# Patient Record
Sex: MALE | Race: WHITE | NOT HISPANIC OR LATINO | Employment: FULL TIME | URBAN - METROPOLITAN AREA
[De-identification: names, ages, dates, MRNs, and addresses within clinical notes are randomized per-mention and may not be internally consistent; named-entity substitution may affect disease eponyms.]

---

## 2017-02-28 ENCOUNTER — GENERIC CONVERSION - ENCOUNTER (OUTPATIENT)
Dept: OTHER | Facility: OTHER | Age: 32
End: 2017-02-28

## 2017-04-13 ENCOUNTER — ALLSCRIPTS OFFICE VISIT (OUTPATIENT)
Dept: OTHER | Facility: OTHER | Age: 32
End: 2017-04-13

## 2017-04-13 DIAGNOSIS — E66.01 MORBID (SEVERE) OBESITY DUE TO EXCESS CALORIES (HCC): ICD-10-CM

## 2017-04-17 ENCOUNTER — ALLSCRIPTS OFFICE VISIT (OUTPATIENT)
Dept: OTHER | Facility: OTHER | Age: 32
End: 2017-04-17

## 2017-05-02 ENCOUNTER — APPOINTMENT (OUTPATIENT)
Dept: LAB | Facility: OTHER | Age: 32
End: 2017-05-02
Payer: COMMERCIAL

## 2017-05-02 ENCOUNTER — ANESTHESIA EVENT (OUTPATIENT)
Dept: GASTROENTEROLOGY | Facility: HOSPITAL | Age: 32
End: 2017-05-02
Payer: COMMERCIAL

## 2017-05-02 DIAGNOSIS — E66.01 MORBID (SEVERE) OBESITY DUE TO EXCESS CALORIES (HCC): ICD-10-CM

## 2017-05-02 LAB
ALBUMIN SERPL BCP-MCNC: 3.4 G/DL (ref 3.5–5)
ALP SERPL-CCNC: 90 U/L (ref 46–116)
ALT SERPL W P-5'-P-CCNC: 36 U/L (ref 12–78)
ANION GAP SERPL CALCULATED.3IONS-SCNC: 8 MMOL/L (ref 4–13)
AST SERPL W P-5'-P-CCNC: 18 U/L (ref 5–45)
BILIRUB SERPL-MCNC: 0.54 MG/DL (ref 0.2–1)
BUN SERPL-MCNC: 8 MG/DL (ref 5–25)
CALCIUM SERPL-MCNC: 8.6 MG/DL (ref 8.3–10.1)
CHLORIDE SERPL-SCNC: 101 MMOL/L (ref 100–108)
CHOLEST SERPL-MCNC: 157 MG/DL (ref 50–200)
CO2 SERPL-SCNC: 29 MMOL/L (ref 21–32)
CREAT SERPL-MCNC: 0.82 MG/DL (ref 0.6–1.3)
ERYTHROCYTE [DISTWIDTH] IN BLOOD BY AUTOMATED COUNT: 13.3 % (ref 11.6–15.1)
GFR SERPL CREATININE-BSD FRML MDRD: >60 ML/MIN/1.73SQ M
GLUCOSE P FAST SERPL-MCNC: 76 MG/DL (ref 65–99)
HCT VFR BLD AUTO: 48.7 % (ref 36.5–49.3)
HDLC SERPL-MCNC: 41 MG/DL (ref 40–60)
HGB BLD-MCNC: 16.1 G/DL (ref 12–17)
LDLC SERPL CALC-MCNC: 91 MG/DL (ref 0–100)
MCH RBC QN AUTO: 28.9 PG (ref 26.8–34.3)
MCHC RBC AUTO-ENTMCNC: 33.1 G/DL (ref 31.4–37.4)
MCV RBC AUTO: 87 FL (ref 82–98)
PLATELET # BLD AUTO: 229 THOUSANDS/UL (ref 149–390)
PMV BLD AUTO: 11.1 FL (ref 8.9–12.7)
POTASSIUM SERPL-SCNC: 3.7 MMOL/L (ref 3.5–5.3)
PROT SERPL-MCNC: 7.6 G/DL (ref 6.4–8.2)
RBC # BLD AUTO: 5.58 MILLION/UL (ref 3.88–5.62)
SODIUM SERPL-SCNC: 138 MMOL/L (ref 136–145)
TRIGL SERPL-MCNC: 123 MG/DL
TSH SERPL DL<=0.05 MIU/L-ACNC: 3.04 UIU/ML (ref 0.36–3.74)
WBC # BLD AUTO: 7.68 THOUSAND/UL (ref 4.31–10.16)

## 2017-05-02 PROCEDURE — 84443 ASSAY THYROID STIM HORMONE: CPT

## 2017-05-02 PROCEDURE — 85027 COMPLETE CBC AUTOMATED: CPT

## 2017-05-02 PROCEDURE — 36415 COLL VENOUS BLD VENIPUNCTURE: CPT

## 2017-05-02 PROCEDURE — 80053 COMPREHEN METABOLIC PANEL: CPT

## 2017-05-02 PROCEDURE — 80061 LIPID PANEL: CPT

## 2017-05-03 ENCOUNTER — HOSPITAL ENCOUNTER (OUTPATIENT)
Facility: HOSPITAL | Age: 32
Setting detail: OUTPATIENT SURGERY
Discharge: HOME | End: 2017-05-03
Attending: SURGERY | Admitting: SURGERY
Payer: COMMERCIAL

## 2017-05-03 ENCOUNTER — ANESTHESIA (OUTPATIENT)
Dept: GASTROENTEROLOGY | Facility: HOSPITAL | Age: 32
End: 2017-05-03
Payer: COMMERCIAL

## 2017-05-03 VITALS
BODY MASS INDEX: 45.1 KG/M2 | TEMPERATURE: 96.9 F | RESPIRATION RATE: 16 BRPM | DIASTOLIC BLOOD PRESSURE: 83 MMHG | HEIGHT: 70 IN | HEART RATE: 73 BPM | OXYGEN SATURATION: 99 % | SYSTOLIC BLOOD PRESSURE: 125 MMHG | WEIGHT: 315 LBS

## 2017-05-03 DIAGNOSIS — E66.9 OBESITY: ICD-10-CM

## 2017-05-03 PROCEDURE — 88305 TISSUE EXAM BY PATHOLOGIST: CPT | Performed by: SURGERY

## 2017-05-03 PROCEDURE — 88342 IMHCHEM/IMCYTCHM 1ST ANTB: CPT | Performed by: SURGERY

## 2017-05-03 RX ORDER — SODIUM CHLORIDE 9 MG/ML
125 INJECTION, SOLUTION INTRAVENOUS CONTINUOUS
Status: DISCONTINUED | OUTPATIENT
Start: 2017-05-03 | End: 2017-05-03 | Stop reason: HOSPADM

## 2017-05-03 RX ORDER — PROPOFOL 10 MG/ML
INJECTION, EMULSION INTRAVENOUS AS NEEDED
Status: DISCONTINUED | OUTPATIENT
Start: 2017-05-03 | End: 2017-05-03 | Stop reason: SURG

## 2017-05-03 RX ADMIN — PROPOFOL 50 MG: 10 INJECTION, EMULSION INTRAVENOUS at 09:24

## 2017-05-03 RX ADMIN — PROPOFOL 50 MG: 10 INJECTION, EMULSION INTRAVENOUS at 09:25

## 2017-05-03 RX ADMIN — PROPOFOL 200 MG: 10 INJECTION, EMULSION INTRAVENOUS at 09:21

## 2017-05-03 RX ADMIN — SODIUM CHLORIDE 125 ML/HR: 0.9 INJECTION, SOLUTION INTRAVENOUS at 09:01

## 2017-05-03 RX ADMIN — LIDOCAINE HYDROCHLORIDE 50 MG: 20 INJECTION, SOLUTION INTRAVENOUS at 09:21

## 2017-05-04 ENCOUNTER — GENERIC CONVERSION - ENCOUNTER (OUTPATIENT)
Dept: OTHER | Facility: OTHER | Age: 32
End: 2017-05-04

## 2017-06-01 ENCOUNTER — ANESTHESIA EVENT (OUTPATIENT)
Dept: PERIOP | Facility: HOSPITAL | Age: 32
DRG: 621 | End: 2017-06-01
Payer: COMMERCIAL

## 2017-06-01 ENCOUNTER — ALLSCRIPTS OFFICE VISIT (OUTPATIENT)
Dept: OTHER | Facility: OTHER | Age: 32
End: 2017-06-01

## 2017-06-20 ENCOUNTER — ANESTHESIA (OUTPATIENT)
Dept: PERIOP | Facility: HOSPITAL | Age: 32
DRG: 621 | End: 2017-06-20
Payer: COMMERCIAL

## 2017-06-20 ENCOUNTER — HOSPITAL ENCOUNTER (INPATIENT)
Facility: HOSPITAL | Age: 32
LOS: 1 days | Discharge: HOME | DRG: 621 | End: 2017-06-21
Attending: SURGERY | Admitting: SURGERY
Payer: COMMERCIAL

## 2017-06-20 DIAGNOSIS — E66.01 MORBID (SEVERE) OBESITY DUE TO EXCESS CALORIES (HCC): ICD-10-CM

## 2017-06-20 PROCEDURE — C9113 INJ PANTOPRAZOLE SODIUM, VIA: HCPCS | Performed by: SURGERY

## 2017-06-20 PROCEDURE — 0DB64Z3 EXCISION OF STOMACH, PERCUTANEOUS ENDOSCOPIC APPROACH, VERTICAL: ICD-10-PCS | Performed by: SURGERY

## 2017-06-20 PROCEDURE — 88307 TISSUE EXAM BY PATHOLOGIST: CPT | Performed by: SURGERY

## 2017-06-20 RX ORDER — DEXAMETHASONE SODIUM PHOSPHATE 4 MG/ML
INJECTION, SOLUTION INTRA-ARTICULAR; INTRALESIONAL; INTRAMUSCULAR; INTRAVENOUS; SOFT TISSUE AS NEEDED
Status: DISCONTINUED | OUTPATIENT
Start: 2017-06-20 | End: 2017-06-20 | Stop reason: SURG

## 2017-06-20 RX ORDER — ACETAMINOPHEN 160 MG/5ML
320 SUSPENSION, ORAL (FINAL DOSE FORM) ORAL EVERY 4 HOURS PRN
Status: DISCONTINUED | OUTPATIENT
Start: 2017-06-20 | End: 2017-06-21 | Stop reason: HOSPADM

## 2017-06-20 RX ORDER — SODIUM CHLORIDE 9 MG/ML
125 INJECTION, SOLUTION INTRAVENOUS CONTINUOUS
Status: DISCONTINUED | OUTPATIENT
Start: 2017-06-20 | End: 2017-06-20

## 2017-06-20 RX ORDER — DIPHENOXYLATE HYDROCHLORIDE AND ATROPINE SULFATE 2.5; .025 MG/1; MG/1
1 TABLET ORAL DAILY
COMMUNITY

## 2017-06-20 RX ORDER — MORPHINE SULFATE 2 MG/ML
2 INJECTION, SOLUTION INTRAMUSCULAR; INTRAVENOUS EVERY 2 HOUR PRN
Status: DISCONTINUED | OUTPATIENT
Start: 2017-06-20 | End: 2017-06-21 | Stop reason: HOSPADM

## 2017-06-20 RX ORDER — ONDANSETRON 2 MG/ML
4 INJECTION INTRAMUSCULAR; INTRAVENOUS EVERY 4 HOURS PRN
Status: DISCONTINUED | OUTPATIENT
Start: 2017-06-20 | End: 2017-06-21 | Stop reason: HOSPADM

## 2017-06-20 RX ORDER — ACETAMINOPHEN 160 MG/5ML
325 SUSPENSION, ORAL (FINAL DOSE FORM) ORAL EVERY 4 HOURS PRN
Status: DISCONTINUED | OUTPATIENT
Start: 2017-06-20 | End: 2017-06-21 | Stop reason: HOSPADM

## 2017-06-20 RX ORDER — OXYCODONE HCL 5 MG/5 ML
10 SOLUTION, ORAL ORAL EVERY 4 HOURS PRN
Status: DISCONTINUED | OUTPATIENT
Start: 2017-06-20 | End: 2017-06-21 | Stop reason: HOSPADM

## 2017-06-20 RX ORDER — OXYCODONE HCL 5 MG/5 ML
5 SOLUTION, ORAL ORAL EVERY 4 HOURS PRN
Status: DISCONTINUED | OUTPATIENT
Start: 2017-06-20 | End: 2017-06-21 | Stop reason: HOSPADM

## 2017-06-20 RX ORDER — MORPHINE SULFATE 4 MG/ML
4 INJECTION, SOLUTION INTRAMUSCULAR; INTRAVENOUS EVERY 2 HOUR PRN
Status: DISCONTINUED | OUTPATIENT
Start: 2017-06-20 | End: 2017-06-21 | Stop reason: HOSPADM

## 2017-06-20 RX ORDER — GLYCOPYRROLATE 0.2 MG/ML
INJECTION INTRAMUSCULAR; INTRAVENOUS AS NEEDED
Status: DISCONTINUED | OUTPATIENT
Start: 2017-06-20 | End: 2017-06-20 | Stop reason: SURG

## 2017-06-20 RX ORDER — SODIUM CHLORIDE, SODIUM LACTATE, POTASSIUM CHLORIDE, CALCIUM CHLORIDE 600; 310; 30; 20 MG/100ML; MG/100ML; MG/100ML; MG/100ML
125 INJECTION, SOLUTION INTRAVENOUS CONTINUOUS
Status: DISCONTINUED | OUTPATIENT
Start: 2017-06-20 | End: 2017-06-21 | Stop reason: HOSPADM

## 2017-06-20 RX ORDER — ONDANSETRON 2 MG/ML
INJECTION INTRAMUSCULAR; INTRAVENOUS AS NEEDED
Status: DISCONTINUED | OUTPATIENT
Start: 2017-06-20 | End: 2017-06-20 | Stop reason: SURG

## 2017-06-20 RX ORDER — HYDROMORPHONE HYDROCHLORIDE 2 MG/ML
INJECTION, SOLUTION INTRAMUSCULAR; INTRAVENOUS; SUBCUTANEOUS AS NEEDED
Status: DISCONTINUED | OUTPATIENT
Start: 2017-06-20 | End: 2017-06-20 | Stop reason: SURG

## 2017-06-20 RX ORDER — BUPIVACAINE HYDROCHLORIDE AND EPINEPHRINE 5; 5 MG/ML; UG/ML
INJECTION, SOLUTION PERINEURAL AS NEEDED
Status: DISCONTINUED | OUTPATIENT
Start: 2017-06-20 | End: 2017-06-20 | Stop reason: HOSPADM

## 2017-06-20 RX ORDER — SCOLOPAMINE TRANSDERMAL SYSTEM 1 MG/1
1 PATCH, EXTENDED RELEASE TRANSDERMAL ONCE AS NEEDED
Status: DISCONTINUED | OUTPATIENT
Start: 2017-06-20 | End: 2017-06-20

## 2017-06-20 RX ORDER — PROMETHAZINE HYDROCHLORIDE 25 MG/ML
25 INJECTION, SOLUTION INTRAMUSCULAR; INTRAVENOUS EVERY 4 HOURS PRN
Status: DISCONTINUED | OUTPATIENT
Start: 2017-06-20 | End: 2017-06-21 | Stop reason: HOSPADM

## 2017-06-20 RX ORDER — ONDANSETRON 2 MG/ML
4 INJECTION INTRAMUSCULAR; INTRAVENOUS ONCE AS NEEDED
Status: DISCONTINUED | OUTPATIENT
Start: 2017-06-20 | End: 2017-06-20 | Stop reason: HOSPADM

## 2017-06-20 RX ORDER — PANTOPRAZOLE SODIUM 40 MG/1
40 INJECTION, POWDER, FOR SOLUTION INTRAVENOUS
Status: DISCONTINUED | OUTPATIENT
Start: 2017-06-20 | End: 2017-06-21 | Stop reason: HOSPADM

## 2017-06-20 RX ORDER — ROCURONIUM BROMIDE 10 MG/ML
INJECTION, SOLUTION INTRAVENOUS AS NEEDED
Status: DISCONTINUED | OUTPATIENT
Start: 2017-06-20 | End: 2017-06-20 | Stop reason: SURG

## 2017-06-20 RX ORDER — MIDAZOLAM HYDROCHLORIDE 1 MG/ML
INJECTION INTRAMUSCULAR; INTRAVENOUS AS NEEDED
Status: DISCONTINUED | OUTPATIENT
Start: 2017-06-20 | End: 2017-06-20 | Stop reason: SURG

## 2017-06-20 RX ORDER — PROPOFOL 10 MG/ML
INJECTION, EMULSION INTRAVENOUS AS NEEDED
Status: DISCONTINUED | OUTPATIENT
Start: 2017-06-20 | End: 2017-06-20 | Stop reason: SURG

## 2017-06-20 RX ORDER — FENTANYL CITRATE 50 UG/ML
INJECTION, SOLUTION INTRAMUSCULAR; INTRAVENOUS AS NEEDED
Status: DISCONTINUED | OUTPATIENT
Start: 2017-06-20 | End: 2017-06-20 | Stop reason: SURG

## 2017-06-20 RX ORDER — MAGNESIUM HYDROXIDE 1200 MG/15ML
LIQUID ORAL AS NEEDED
Status: DISCONTINUED | OUTPATIENT
Start: 2017-06-20 | End: 2017-06-20 | Stop reason: HOSPADM

## 2017-06-20 RX ADMIN — OXYCODONE HYDROCHLORIDE 10 MG: 5 SOLUTION ORAL at 19:47

## 2017-06-20 RX ADMIN — GLYCOPYRROLATE 0.6 MG: 0.2 INJECTION, SOLUTION INTRAMUSCULAR; INTRAVENOUS at 11:34

## 2017-06-20 RX ADMIN — SODIUM CHLORIDE: 0.9 INJECTION, SOLUTION INTRAVENOUS at 11:50

## 2017-06-20 RX ADMIN — HYDROMORPHONE HYDROCHLORIDE 0.5 MG: 1 INJECTION, SOLUTION INTRAMUSCULAR; INTRAVENOUS; SUBCUTANEOUS at 12:06

## 2017-06-20 RX ADMIN — HYDROMORPHONE HYDROCHLORIDE 0.5 MG: 2 INJECTION, SOLUTION INTRAMUSCULAR; INTRAVENOUS; SUBCUTANEOUS at 11:34

## 2017-06-20 RX ADMIN — ENOXAPARIN SODIUM 40 MG: 40 INJECTION SUBCUTANEOUS at 09:48

## 2017-06-20 RX ADMIN — FENTANYL CITRATE 100 MCG: 50 INJECTION, SOLUTION INTRAMUSCULAR; INTRAVENOUS at 10:24

## 2017-06-20 RX ADMIN — MORPHINE SULFATE 4 MG: 4 INJECTION, SOLUTION INTRAMUSCULAR; INTRAVENOUS at 17:11

## 2017-06-20 RX ADMIN — ONDANSETRON 4 MG: 2 INJECTION INTRAMUSCULAR; INTRAVENOUS at 19:55

## 2017-06-20 RX ADMIN — PANTOPRAZOLE SODIUM 40 MG: 40 INJECTION, POWDER, FOR SOLUTION INTRAVENOUS at 17:10

## 2017-06-20 RX ADMIN — SODIUM CHLORIDE 125 ML/HR: 0.9 INJECTION, SOLUTION INTRAVENOUS at 09:56

## 2017-06-20 RX ADMIN — NEOSTIGMINE METHYLSULFATE 3 MG: 1 INJECTION, SOLUTION INTRAMUSCULAR; INTRAVENOUS; SUBCUTANEOUS at 11:34

## 2017-06-20 RX ADMIN — HYDROMORPHONE HYDROCHLORIDE 0.5 MG: 2 INJECTION, SOLUTION INTRAMUSCULAR; INTRAVENOUS; SUBCUTANEOUS at 11:19

## 2017-06-20 RX ADMIN — HYDROMORPHONE HYDROCHLORIDE 0.5 MG: 2 INJECTION, SOLUTION INTRAMUSCULAR; INTRAVENOUS; SUBCUTANEOUS at 11:46

## 2017-06-20 RX ADMIN — HYDROMORPHONE HYDROCHLORIDE 0.5 MG: 1 INJECTION, SOLUTION INTRAMUSCULAR; INTRAVENOUS; SUBCUTANEOUS at 12:35

## 2017-06-20 RX ADMIN — ONDANSETRON HYDROCHLORIDE 4 MG: 2 INJECTION, SOLUTION INTRAVENOUS at 10:32

## 2017-06-20 RX ADMIN — SCOPALAMINE 1 PATCH: 1 PATCH, EXTENDED RELEASE TRANSDERMAL at 09:12

## 2017-06-20 RX ADMIN — ROCURONIUM BROMIDE 50 MG: 10 INJECTION, SOLUTION INTRAVENOUS at 10:24

## 2017-06-20 RX ADMIN — FENTANYL CITRATE 50 MCG: 50 INJECTION, SOLUTION INTRAMUSCULAR; INTRAVENOUS at 11:03

## 2017-06-20 RX ADMIN — MIDAZOLAM HYDROCHLORIDE 4 MG: 1 INJECTION, SOLUTION INTRAMUSCULAR; INTRAVENOUS at 10:17

## 2017-06-20 RX ADMIN — ACETAMINOPHEN 325 MG: 160 SUSPENSION ORAL at 19:47

## 2017-06-20 RX ADMIN — CEFAZOLIN SODIUM 2000 MG: 2 SOLUTION INTRAVENOUS at 10:34

## 2017-06-20 RX ADMIN — SODIUM CHLORIDE, SODIUM LACTATE, POTASSIUM CHLORIDE, AND CALCIUM CHLORIDE 125 ML/HR: .6; .31; .03; .02 INJECTION, SOLUTION INTRAVENOUS at 12:54

## 2017-06-20 RX ADMIN — HYDROMORPHONE HYDROCHLORIDE 0.5 MG: 2 INJECTION, SOLUTION INTRAMUSCULAR; INTRAVENOUS; SUBCUTANEOUS at 11:54

## 2017-06-20 RX ADMIN — PROPOFOL 200 MG: 10 INJECTION, EMULSION INTRAVENOUS at 10:24

## 2017-06-20 RX ADMIN — DEXAMETHASONE SODIUM PHOSPHATE 4 MG: 4 INJECTION, SOLUTION INTRAMUSCULAR; INTRAVENOUS at 10:32

## 2017-06-20 RX ADMIN — FENTANYL CITRATE 50 MCG: 50 INJECTION, SOLUTION INTRAMUSCULAR; INTRAVENOUS at 10:35

## 2017-06-20 RX ADMIN — MORPHINE SULFATE 4 MG: 4 INJECTION, SOLUTION INTRAMUSCULAR; INTRAVENOUS at 14:06

## 2017-06-20 RX ADMIN — SODIUM CHLORIDE: 0.9 INJECTION, SOLUTION INTRAVENOUS at 11:12

## 2017-06-20 RX ADMIN — HYDROMORPHONE HYDROCHLORIDE 0.5 MG: 1 INJECTION, SOLUTION INTRAMUSCULAR; INTRAVENOUS; SUBCUTANEOUS at 12:21

## 2017-06-20 RX ADMIN — LIDOCAINE HYDROCHLORIDE 100 MG: 20 INJECTION, SOLUTION INTRAVENOUS at 10:24

## 2017-06-20 RX ADMIN — ROCURONIUM BROMIDE 10 MG: 10 INJECTION, SOLUTION INTRAVENOUS at 10:47

## 2017-06-20 RX ADMIN — HYDROMORPHONE HYDROCHLORIDE 0.5 MG: 1 INJECTION, SOLUTION INTRAMUSCULAR; INTRAVENOUS; SUBCUTANEOUS at 12:46

## 2017-06-20 RX ADMIN — SODIUM CHLORIDE, SODIUM LACTATE, POTASSIUM CHLORIDE, AND CALCIUM CHLORIDE 125 ML/HR: .6; .31; .03; .02 INJECTION, SOLUTION INTRAVENOUS at 20:20

## 2017-06-21 VITALS
DIASTOLIC BLOOD PRESSURE: 75 MMHG | SYSTOLIC BLOOD PRESSURE: 127 MMHG | WEIGHT: 304.9 LBS | OXYGEN SATURATION: 93 % | BODY MASS INDEX: 43.65 KG/M2 | HEART RATE: 54 BPM | TEMPERATURE: 97.8 F | HEIGHT: 70 IN | RESPIRATION RATE: 16 BRPM

## 2017-06-21 PROBLEM — E66.01 MORBID OBESITY (HCC): Status: ACTIVE | Noted: 2017-06-21

## 2017-06-21 LAB
ANION GAP SERPL CALCULATED.3IONS-SCNC: 8 MMOL/L (ref 4–13)
BUN SERPL-MCNC: 6 MG/DL (ref 5–25)
CALCIUM SERPL-MCNC: 8.7 MG/DL (ref 8.3–10.1)
CHLORIDE SERPL-SCNC: 102 MMOL/L (ref 100–108)
CO2 SERPL-SCNC: 28 MMOL/L (ref 21–32)
CREAT SERPL-MCNC: 0.86 MG/DL (ref 0.6–1.3)
ERYTHROCYTE [DISTWIDTH] IN BLOOD BY AUTOMATED COUNT: 13.6 % (ref 11.6–15.1)
GFR SERPL CREATININE-BSD FRML MDRD: >60 ML/MIN/1.73SQ M
GLUCOSE SERPL-MCNC: 126 MG/DL (ref 65–140)
HCT VFR BLD AUTO: 45.2 % (ref 36.5–49.3)
HGB BLD-MCNC: 15.2 G/DL (ref 12–17)
MCH RBC QN AUTO: 28.5 PG (ref 26.8–34.3)
MCHC RBC AUTO-ENTMCNC: 33.6 G/DL (ref 31.4–37.4)
MCV RBC AUTO: 85 FL (ref 82–98)
PLATELET # BLD AUTO: 265 THOUSANDS/UL (ref 149–390)
PMV BLD AUTO: 10.5 FL (ref 8.9–12.7)
POTASSIUM SERPL-SCNC: 4.5 MMOL/L (ref 3.5–5.3)
RBC # BLD AUTO: 5.33 MILLION/UL (ref 3.88–5.62)
SODIUM SERPL-SCNC: 138 MMOL/L (ref 136–145)
WBC # BLD AUTO: 10.79 THOUSAND/UL (ref 4.31–10.16)

## 2017-06-21 PROCEDURE — 80048 BASIC METABOLIC PNL TOTAL CA: CPT | Performed by: SURGERY

## 2017-06-21 PROCEDURE — C9113 INJ PANTOPRAZOLE SODIUM, VIA: HCPCS | Performed by: SURGERY

## 2017-06-21 PROCEDURE — 85027 COMPLETE CBC AUTOMATED: CPT | Performed by: SURGERY

## 2017-06-21 RX ORDER — OXYCODONE HYDROCHLORIDE AND ACETAMINOPHEN 5; 325 MG/1; MG/1
1 TABLET ORAL EVERY 4 HOURS PRN
Qty: 20 TABLET | Refills: 0
Start: 2017-06-21 | End: 2017-07-01

## 2017-06-21 RX ORDER — OMEPRAZOLE 20 MG/1
20 CAPSULE, DELAYED RELEASE ORAL DAILY
Qty: 30 CAPSULE | Refills: 0 | Status: SHIPPED | OUTPATIENT
Start: 2017-06-21 | End: 2018-07-06 | Stop reason: ALTCHOICE

## 2017-06-21 RX ADMIN — ACETAMINOPHEN 325 MG: 160 SUSPENSION ORAL at 08:11

## 2017-06-21 RX ADMIN — SODIUM CHLORIDE, SODIUM LACTATE, POTASSIUM CHLORIDE, AND CALCIUM CHLORIDE 125 ML/HR: .6; .31; .03; .02 INJECTION, SOLUTION INTRAVENOUS at 04:01

## 2017-06-21 RX ADMIN — OXYCODONE HYDROCHLORIDE 10 MG: 5 SOLUTION ORAL at 02:55

## 2017-06-21 RX ADMIN — OXYCODONE HYDROCHLORIDE 10 MG: 5 SOLUTION ORAL at 08:11

## 2017-06-21 RX ADMIN — PANTOPRAZOLE SODIUM 40 MG: 40 INJECTION, POWDER, FOR SOLUTION INTRAVENOUS at 08:10

## 2017-06-21 RX ADMIN — ACETAMINOPHEN 325 MG: 160 SUSPENSION ORAL at 02:55

## 2017-06-21 RX ADMIN — ENOXAPARIN SODIUM 40 MG: 40 INJECTION SUBCUTANEOUS at 08:10

## 2017-06-23 ENCOUNTER — GENERIC CONVERSION - ENCOUNTER (OUTPATIENT)
Dept: OTHER | Facility: OTHER | Age: 32
End: 2017-06-23

## 2017-06-29 ENCOUNTER — GENERIC CONVERSION - ENCOUNTER (OUTPATIENT)
Dept: OTHER | Facility: OTHER | Age: 32
End: 2017-06-29

## 2017-06-29 ENCOUNTER — ALLSCRIPTS OFFICE VISIT (OUTPATIENT)
Dept: OTHER | Facility: OTHER | Age: 32
End: 2017-06-29

## 2017-07-13 ENCOUNTER — GENERIC CONVERSION - ENCOUNTER (OUTPATIENT)
Dept: OTHER | Facility: OTHER | Age: 32
End: 2017-07-13

## 2017-08-03 ENCOUNTER — GENERIC CONVERSION - ENCOUNTER (OUTPATIENT)
Dept: OTHER | Facility: OTHER | Age: 32
End: 2017-08-03

## 2017-09-29 ENCOUNTER — ALLSCRIPTS OFFICE VISIT (OUTPATIENT)
Dept: OTHER | Facility: OTHER | Age: 32
End: 2017-09-29

## 2017-09-29 DIAGNOSIS — E66.9 OBESITY: ICD-10-CM

## 2017-09-29 DIAGNOSIS — Z00.00 ENCOUNTER FOR GENERAL ADULT MEDICAL EXAMINATION WITHOUT ABNORMAL FINDINGS: ICD-10-CM

## 2017-09-29 DIAGNOSIS — K91.2 POSTSURGICAL MALABSORPTION, NOT ELSEWHERE CLASSIFIED: ICD-10-CM

## 2017-09-29 DIAGNOSIS — Z98.84 BARIATRIC SURGERY STATUS: ICD-10-CM

## 2017-12-22 ENCOUNTER — APPOINTMENT (OUTPATIENT)
Dept: LAB | Facility: OTHER | Age: 32
End: 2017-12-22
Payer: COMMERCIAL

## 2017-12-22 ENCOUNTER — GENERIC CONVERSION - ENCOUNTER (OUTPATIENT)
Dept: OTHER | Facility: OTHER | Age: 32
End: 2017-12-22

## 2017-12-22 ENCOUNTER — TRANSCRIBE ORDERS (OUTPATIENT)
Dept: LAB | Facility: OTHER | Age: 32
End: 2017-12-22

## 2017-12-22 DIAGNOSIS — Z98.84 BARIATRIC SURGERY STATUS: ICD-10-CM

## 2017-12-22 DIAGNOSIS — Z00.00 ENCOUNTER FOR GENERAL ADULT MEDICAL EXAMINATION WITHOUT ABNORMAL FINDINGS: ICD-10-CM

## 2017-12-22 DIAGNOSIS — E66.9 OBESITY: ICD-10-CM

## 2017-12-22 DIAGNOSIS — K91.2 POSTSURGICAL MALABSORPTION, NOT ELSEWHERE CLASSIFIED: ICD-10-CM

## 2017-12-22 LAB
25(OH)D3 SERPL-MCNC: 27.6 NG/ML (ref 30–100)
ALBUMIN SERPL BCP-MCNC: 3.4 G/DL (ref 3.5–5)
ALP SERPL-CCNC: 90 U/L (ref 46–116)
ALT SERPL W P-5'-P-CCNC: 16 U/L (ref 12–78)
ANION GAP SERPL CALCULATED.3IONS-SCNC: 3 MMOL/L (ref 4–13)
AST SERPL W P-5'-P-CCNC: 13 U/L (ref 5–45)
BILIRUB SERPL-MCNC: 0.79 MG/DL (ref 0.2–1)
BUN SERPL-MCNC: 9 MG/DL (ref 5–25)
CALCIUM SERPL-MCNC: 9.3 MG/DL (ref 8.3–10.1)
CHLORIDE SERPL-SCNC: 104 MMOL/L (ref 100–108)
CHOLEST SERPL-MCNC: 155 MG/DL (ref 50–200)
CO2 SERPL-SCNC: 32 MMOL/L (ref 21–32)
CREAT SERPL-MCNC: 0.82 MG/DL (ref 0.6–1.3)
ERYTHROCYTE [DISTWIDTH] IN BLOOD BY AUTOMATED COUNT: 13.2 % (ref 11.6–15.1)
FERRITIN SERPL-MCNC: 229 NG/ML (ref 8–388)
FOLATE SERPL-MCNC: >20 NG/ML (ref 3.1–17.5)
GFR SERPL CREATININE-BSD FRML MDRD: 115 ML/MIN/1.73SQ M
GLUCOSE P FAST SERPL-MCNC: 90 MG/DL (ref 65–99)
HCT VFR BLD AUTO: 48.6 % (ref 36.5–49.3)
HDLC SERPL-MCNC: 46 MG/DL (ref 40–60)
HGB BLD-MCNC: 16.2 G/DL (ref 12–17)
LDLC SERPL CALC-MCNC: 88 MG/DL (ref 0–100)
MCH RBC QN AUTO: 29 PG (ref 26.8–34.3)
MCHC RBC AUTO-ENTMCNC: 33.3 G/DL (ref 31.4–37.4)
MCV RBC AUTO: 87 FL (ref 82–98)
PLATELET # BLD AUTO: 212 THOUSANDS/UL (ref 149–390)
PMV BLD AUTO: 10.4 FL (ref 8.9–12.7)
POTASSIUM SERPL-SCNC: 4.2 MMOL/L (ref 3.5–5.3)
PROT SERPL-MCNC: 7.3 G/DL (ref 6.4–8.2)
PTH-INTACT SERPL-MCNC: 38.4 PG/ML (ref 14–72)
RBC # BLD AUTO: 5.58 MILLION/UL (ref 3.88–5.62)
SODIUM SERPL-SCNC: 139 MMOL/L (ref 136–145)
TRIGL SERPL-MCNC: 105 MG/DL
VIT B12 SERPL-MCNC: 506 PG/ML (ref 100–900)
WBC # BLD AUTO: 5.57 THOUSAND/UL (ref 4.31–10.16)

## 2017-12-22 PROCEDURE — 82607 VITAMIN B-12: CPT

## 2017-12-22 PROCEDURE — 80061 LIPID PANEL: CPT

## 2017-12-22 PROCEDURE — 83970 ASSAY OF PARATHORMONE: CPT

## 2017-12-22 PROCEDURE — 82728 ASSAY OF FERRITIN: CPT

## 2017-12-22 PROCEDURE — 85027 COMPLETE CBC AUTOMATED: CPT

## 2017-12-22 PROCEDURE — 80053 COMPREHEN METABOLIC PANEL: CPT

## 2017-12-22 PROCEDURE — 84590 ASSAY OF VITAMIN A: CPT

## 2017-12-22 PROCEDURE — 82746 ASSAY OF FOLIC ACID SERUM: CPT

## 2017-12-22 PROCEDURE — 84425 ASSAY OF VITAMIN B-1: CPT

## 2017-12-22 PROCEDURE — 36415 COLL VENOUS BLD VENIPUNCTURE: CPT

## 2017-12-22 PROCEDURE — 82306 VITAMIN D 25 HYDROXY: CPT

## 2017-12-26 LAB — VIT A SERPL-MCNC: 34 UG/DL (ref 24–85)

## 2017-12-27 LAB — VIT B1 BLD-SCNC: 147.7 NMOL/L (ref 66.5–200)

## 2018-01-02 ENCOUNTER — GENERIC CONVERSION - ENCOUNTER (OUTPATIENT)
Dept: OTHER | Facility: OTHER | Age: 33
End: 2018-01-02

## 2018-01-04 ENCOUNTER — ALLSCRIPTS OFFICE VISIT (OUTPATIENT)
Dept: OTHER | Facility: OTHER | Age: 33
End: 2018-01-04

## 2018-01-05 NOTE — PROGRESS NOTES
Assessment   1  Status post laparoscopic sleeve gastrectomy (V45 86) (Z98 84)   2  Postgastrectomy malabsorption (579 3) (K91 2,Z90 3)   3  Obesity (BMI 30-39 9) (278 00) (E66 9)   4  Vitamin D insufficiency (268 9) (E55 9)    Plan   Obesity (BMI 30-39 9), Postgastrectomy malabsorption, Status post laparoscopic sleeve    gastrectomy, Vitamin D insufficiency    · (1) CBC/ PLT (NO DIFF); Status:Active; Requested WQJ:[id number]; Perform:[facility]; FMQ:62QKL2800;ULQYQPE;KXH:KSCYNPR (BMI 30-39 9), Postgastrectomy malabsorption, Status post laparoscopic sleeve gastrectomy, Vitamin D insufficiency; Ordered By:[facility];   · (1) COMPREHENSIVE METABOLIC PANEL; Status:Active; Requested VON:[id number]; Perform:[facility]; MGN:36CMM6975;EMNWYMK;NSE:TBJUAWR (BMI 30-39 9), Postgastrectomy malabsorption, Status post laparoscopic sleeve gastrectomy, Vitamin D insufficiency; Ordered By:[facility];   · (1) FERRITIN; Status:Active; Requested HCY:[id number]; Perform:[facility]; RVS:46YCS2989;RYYOSUG;QFZ:NYLGLYJ (BMI 30-39 9), Postgastrectomy malabsorption, Status post laparoscopic sleeve gastrectomy, Vitamin D insufficiency; Ordered By:[facility];   · (1) FOLATE; Status:Active; Requested PJS:[id number]; Perform:[facility]; XGO:71JYH8201;RTCJSVP;ZIN:FYSIWAG (BMI 30-39 9), Postgastrectomy malabsorption, Status post laparoscopic sleeve gastrectomy, Vitamin D insufficiency; [facility];   · (1) LIPID PANEL, FASTING; Status:Active; Requested XSC:[id number]; Perform:[facility]; BRAXTON:92BEB8366;MRPUBCM;ZGO:WHDWEJF (BMI 30-39 9), Postgastrectomy malabsorption, Status post laparoscopic sleeve gastrectomy, Vitamin D insufficiency; Ordered By:China Alfaro;   · (1) PTH N-TERMINAL (INTACT); Status:Active; Requested HCN:[id number];     Perform:[facility]; NBE:48CER8707;RQMWJHV;TWR:NPSNQKT (BMI 30-39 9), Postgastrectomy malabsorption, Status post laparoscopic sleeve gastrectomy, Vitamin D insufficiency; Ordered By:Andra Alfaro;   · (1) VITAMIN A; Status:Active; Requested AZA:[id number]; Perform:[facility]; OYR:23ECA8073;IBYNAKT;EBJ:DPCLJMS (BMI 30-39 9), Postgastrectomy malabsorption, Status post laparoscopic sleeve gastrectomy, Vitamin D insufficiency; Ordered By:China Alfaro;   · (1) VITAMIN B1, WHOLE BLOOD; Status:Active; Requested PXW:[id number]; Perform:[facility]; TXJ:32AOQ4440;MIONDAQ;QPY:QEHSUYH (BMI 30-39 9), Postgastrectomy malabsorption, Status post laparoscopic sleeve gastrectomy, Vitamin D insufficiency; Ordered By:Andra Alfaro;   · (1) VITAMIN B12; Status:Active; Requested LNP:[id number]; Perform:[facility]; EUA:52KNX1471;XPMUJYQ;QFY:UWJOHTF (BMI 30-39 9), Postgastrectomy malabsorption, Status post laparoscopic sleeve gastrectomy, Vitamin D insufficiency; Ordered By:Andra Alfaro;   · (1) VITAMIN D 25-HYDROXY; Status:Active; Requested NAC:[id number]; Perform:[facility]; KEK:28MZG6410;QEHVOUM;GZD:TCVUSDS (BMI 30-39 9), Postgastrectomy malabsorption, Status post laparoscopic sleeve gastrectomy, Vitamin D insufficiency; Ordered By:Andra Alfaro;    Discussion/Summary      Follow-up in 6 months  Follow diet as discussed  Get lab work done prior to your next office visit  Follow vitamin/mineral recommendations as reviewed with you  Exercise as tolerated  our office if you have any problems with abdominal pain especially if associated with fever, chills, nausea, vomiting, or any other concerns  lap sleeve gastrectomy/ obesity I A /bmi 291 is 26year old male Status post laparoscopic sleeve gastrectomy by Dr Rayfield Boeck 6/202/107 is here for routine follow-up   Has no complaints today tolerating a regular diet eating at least 70 grams of protein per day between 30/30 to 30/60 minute rule with liquids which is fine at least 64 ounces of fluid per day exercising regularly-encouraged same/-he plans to go skiing this week and is excited about this because he has not been able to do this for a few years because of his weight and joint points and notes his joint pain has essentailly resolved with his weight loss has lost 61% excess body weight loss which is above average for this time frame  IIB is resolved-now obesity IA /bmi of 34 1   to trial taper off his ppi over the next 2-3 months-advised on same  Malabsorption- pateint is at risk for malabsorption of vitamins/minerals secondary to malabsorption from her procedure and restriction of intakes current supplements and advised on same is taking 4 bariatric fusion and one calcium supplement-he did recently increase an extra 2000 IU vitamin D3 daily as advised  recheck routine labs prior to his annual visit    vitamin D insufficiency-he is taking extra 2000 IU vitamin D3 daily as advised  The patient has the current Goals: Continued weight loss with good nutrition intakes vitamin/mineral levels as tolerated      The patent has the current Barriers: None identified      Patient is able to Self-Care  Educational resources provided: N/a  Possible side effects of new medications were reviewed with the patient/guardian today  The treatment plan was reviewed with the patient/guardian  The patient/guardian understands and agrees with the treatment plan    He was advised to follow up due to malabsorption risks  Chief Complaint   Patient in office today for 3rd post op visit  He is walking and taking vitamins  Post-Op   Post-Op Bariatric Surgery:      Natacha Ford is status post lap sleeve procedure,-- performed on 6/20/2017--   by Dr Adela Velázquez  HPI: today's weight is 235 5 lb pounds,-- today's BMI is 34 1-- and-- his total weight loss is 61% excess body weight loss pounds  The patient reports no nausea,-- no vomiting,-- no constipation,-- no diarrhea,-- no chest pain-- and-- no abdominal pain  Diet and Exercise: Diet history reviewed and discussed with the patient  Weight loss/gains to date discussed with the patient  Supplements: multivitamins,-- calcium-- and-- just added extra vitamin D as advised  PE:      Abdominal exam: soft-- and-- no incisional hernia  Assessment:      Post-op, the patient is doing well  Plan: Activity restrictions: None  Instructions / Recommendations: recommended a daily protein intake of  grams,-- vitamin supplement(s) recommended,-- mineral supplement(s) recommended,-- recommended exercising at least 150 minutes per week,-- diet as discussed-- and-- instructed to call the office for concerns, questions, or problems  The patient was instructed to follow up in 6 months  Review of Systems        Constitutional: planned weight loss, but-- not feeling poorly  Cardiovascular: no chest pain-- and-- no palpitations  Respiratory: no shortness of breath-- and-- no wheezing  Gastrointestinal: no abdominal pain,-- no nausea,-- no vomiting,-- no constipation-- and-- no diarrhea  Psychiatric: no anxiety-- and-- no depression  Active Problems   1  Obesity (BMI 30-39 9) (278 00) (E66 9)   2  Postgastrectomy malabsorption (579 3) (K91 2,Z90 3)   3  Preop cardiovascular exam (V72 81) (Z01 810)   4  Status post laparoscopic sleeve gastrectomy (V45 86) (Z98 84)    Social History    · Denied: History of Drug use   · Never a smoker   · Occasional alcohol use  The social history was reviewed and updated today  Current Meds    1  Bariatric Fusion Oral Tablet Chewable; Therapy: ([place]) to Recorded   2  Calcium CHEW;     Therapy: ([place]) to Recorded   3  Omeprazole 20 MG Oral Capsule Delayed Release; take 1 capsule daily; Therapy: 99LMG4447 to (Evaluate:39Bmi7636)  Requested for: 96VEX7493; Last     Rx:01Jun2017 Ordered     The medication list was reviewed and updated today  Allergies   1   No Known Drug Allergies    Vitals    Recorded: 73PRJ2828 02:14PM   Temperature 98 F   Heart Rate 82   Respiration 18   Systolic 303   Diastolic 64   Height 5 ft 9 7 in   Weight 235 lb 8 0 oz   BMI Calculated 34 08   BSA Calculated 2 23     Physical Exam        Constitutional      General appearance: No acute distress, well appearing and well nourished  Eyes bilateral conjunctiva without pallor  Ears, Nose, Mouth, and Throat mucous membranes moist       Pulmonary      Respiratory effort: No increased work of breathing or signs of respiratory distress  Auscultation of lungs: Clear to auscultation, equal breath sounds bilaterally, no wheezes, no rales, no rhonci  Cardiovascular      Auscultation of heart: Normal rate and rhythm, normal S1 and S2, without murmurs  Abdomen soft, no incisional hernias appreciated        Musculoskeletal      Gait and station: Normal        Psychiatric      Orientation to person, place and time: Normal        Mood and affect: Normal           Signatures    Electronically signed by : DELORIS Aponte; Jan 4 2018  2:40PM EST                       (Author)     Electronically signed by : TRU Bhakta ; Jan 4 2018  4:29PM EST                       (Validation)

## 2018-01-10 NOTE — PROGRESS NOTES
History of Present Illness  Bariatric MNT [facility] Surgery Screening Preop [facility]:     He was on time  His surgeon is Dr Genaro Melgar  The patient was present at the session and his patient's mother attended the session  The diagnosis/reason for the appointment is: He has Grade III Obesity with a BMI of 48 4  Exercise Frequency:  He does not exercise  He felt/thought they felt his best at 200 lbs pounds he last weighed this freshman year in high school  He completed a food journal He drinks 33-64oz daily cups of water daily He drinks 16oz daily caffienated beverages daily His motivators are:improved health  His obesity/being overweight is related to excessive calorie intake, sedentary lifestyle and is evidenced by: BMI 48 4  Knowledge Deficit Prior to Education  He is new to the diet  Barriers to education:  He has no barriers to education  Medical Nutrition Therapy Intervention: Individualized Meal Plan, Daily Food /Exercise Diary, Lifestyle /Behavior Modification Techniques, Basic Pathophysiology of Obesity, Label Reading, Checklist of the Overview of Lesson Plans, Surgical changes to Stomach/GI and Food/Medication Interactions  Area's Reviewed: Post - surgery goal weight, Web forum, Lifestyle Rakesh Krueger Modification Techniques, [address] [facility] [employer] ( hand out for [employer]), Pre- surgery goals /rules and Appetite Awareness  Brief Review of Vitamins, diet progression for post-op and Pathophysiology of Obesity  His comprehension of the presented material was very good  His receptivity to the presented material was very good  His motivation was very good  Provided: Nutrition Guidelines for Gastric Surgery, Food Journaling Application handout, Bariatric Program Pre- Surgery Nutrition and Goals  Patient is a 27year old who is here for nutritional evaluation for weight loss surgery  I reviewed comorbidities and medications prior to session   he first recalls having problems with weight gain at the age of 25   he has dieted in the past with variable success but would regain the weight back  (refer to diet history for details)  For his personal pre-operative goals he will: 1  physical activity: count at least 5000 steps daily, increase by 1000 steps every 2 weeks until at least 79056 steps daily is achieved and maintained 2  food journal at least 3 days per week he will complete all 6 lesson plans in her bariatric booklet  he was instructed on the importance of consistent vitamin and mineral intake after her surgery to prevent deficiencies  he currently does not take any vitamin or mineral supplements  Recommended that he take an adult multivitamin/mineral plus 2000 IU of vitamin D3  Reviewed importance of support after surgery and discussed the web forum, jose, pep rally and support group  Patient states adequate knowledge of nutrition, exercise and behavior modification required for long term success  Pt  is recommended for surgery and is aware that he will be required to follow the 2 week pre operative liver shrinking diet prior to surgery  Pt also understands that he needs to implement lifestyle changes in preparation for surgery  Active Problems    1  Morbid obesity (278 01) (E66 01)    Past Medical History    1  History of No significant past medical history    Surgical History    1  History of Oral Surgery Tooth Extraction   2  History of Tonsillectomy    Family History  Mother    1  Family history of hypothyroidism (V18 19) (Z83 49)  Father    2  Family history of cardiac disorder (V17 49) (Z82 49)   3  Family history of diabetes mellitus (V18 0) (Z83 3)   4  Family history of hypertension (V17 49) (Z82 49)    Social History    · Denied: History of Drug use   · Never a smoker   · Occasional alcohol use    Current Meds   1  No Reported Medications Recorded    Allergies    1   No Known Drug Allergies    Vitals  Signs   Recorded: 89Yho5682 04:13PM   Height: 5 ft 9 7 in  Weight: 334 lb 6 4 oz  BMI Calculated: 48 39  BSA Calculated: 2 59    Future Appointments    Date/Time Provider Specialty Site   03/16/2017 03:15 PM TRU Muñiz   General Surgery [facility]     Signatures   Electronically signed by : CARISSA Ladd; Feb 28 2017  4:16PM EST                       (Author)    Electronically signed by : TRU Jurado ; Mar  2 2017  1:04PM EST                       (Validation)

## 2018-01-11 NOTE — MISCELLANEOUS
Message  Pt called office today because he was concerned he had a black stool  He said it was only one time and it occurred today  He also said he is lethargic today  He is not feeling week and is able to do his usually routine but is very tired  He reports his stool was formed and not diarrhea  Told pt to monitor it for now  Made him aware of the s/s to look for and told him if they occur to go to the ER  He understood and said he would  Active Problems    1  Morbid obesity (278 01) (E66 01)   2  Postgastrectomy malabsorption (579 3) (K91 2,Z90 3)   3  Preop cardiovascular exam (V72 81) (Z01 810)   4  Status post laparoscopic sleeve gastrectomy (V45 86) (Z98 84)    Current Meds   1  Bariatric Fusion Oral Tablet Chewable; Therapy: ([place]) to Recorded   2  Calcium CHEW;   Therapy: ([place]) to Recorded   3  Enoxaparin Sodium 40 MG/0 4ML Subcutaneous Solution (Lovenox); inject once daily for   2 weeks post surgery; Therapy: 18QCD6543 to (Last Rx:15Jun2017)  Requested for: 27JPV4903 Ordered   4  Omeprazole 20 MG Oral Capsule Delayed Release; take 1 capsule daily; Therapy: 26NNC8426 to (Evaluate:38Iwk2405)  Requested for: 37ISC2202; Last   Rx:01Jun2017 Ordered   5  Oxycodone-Acetaminophen 5-325 MG Oral Tablet (Percocet); TAKE 1 TABLET EVERY 4   TO 6 HOURS AS NEEDED FOR PAIN;   Therapy: 07FEC6654 to (Evaluate:84Yjh7967); Last VQ:66NEN2569 Ordered    Allergies    1   No Known Drug Allergies    Signatures   Electronically signed by : Shalom Baker LPN; Jul 13 9103  6:56QQ EST                       (Author)

## 2018-01-11 NOTE — PROGRESS NOTES
Active Problems    1  Morbid obesity (278 01) (E66 01)   2  Postgastrectomy malabsorption (579 3) (K91 2,Z90 3)   3  Preop cardiovascular exam (V72 81) (Z01 810)   4  Status post laparoscopic sleeve gastrectomy (V45 86) (Z98 84)    Current Meds   1  Bariatric Fusion Oral Tablet Chewable; Therapy: ([place]) to Recorded   2  Calcium CHEW;   Therapy: ([place]) to Recorded   3  Enoxaparin Sodium 40 MG/0 4ML Subcutaneous Solution (Lovenox); inject once daily for   2 weeks post surgery; Therapy: 28IIH7710 to (Last Rx:15Jun2017)  Requested for: 24MAU8050 Ordered   4  Omeprazole 20 MG Oral Capsule Delayed Release; take 1 capsule daily; Therapy: 90MOO7275 to (Evaluate:00Bnz9920)  Requested for: 16DPN1652; Last   Rx:01Jun2017 Ordered   5  Oxycodone-Acetaminophen 5-325 MG Oral Tablet (Percocet); TAKE 1 TABLET EVERY 4   TO 6 HOURS AS NEEDED FOR PAIN;   Therapy: 91EAN4841 to (Evaluate:65Ejd8550); Last TW:56MKE2958 Ordered    Allergies    1  No Known Drug Allergies    Discussion/Summary    Patient attended 5 week post op class  Reviewed diet progression, vitamin and mineral recommendations, 30/60 rules, volume of foods, protein supplements, hydration needs, antacid guidelines, recommendation to f/u with pcp concerning med adjustments, exercise guidelines, hair shedding, contraception guidelines, constipation prevention and treatment, alcohol avoidance and recommendations of when to call the office  Patient was also weighed and filled out form for program   Time was left for discussion and to answer questions        Signatures   Electronically signed by : CARISSA Mcpherson; Aug  3 2017  2:49PM EST                       (Author)

## 2018-01-12 VITALS
OXYGEN SATURATION: 97 % | BODY MASS INDEX: 45.1 KG/M2 | WEIGHT: 315 LBS | DIASTOLIC BLOOD PRESSURE: 74 MMHG | HEIGHT: 70 IN | HEART RATE: 76 BPM | SYSTOLIC BLOOD PRESSURE: 128 MMHG

## 2018-01-12 VITALS — HEIGHT: 70 IN | BODY MASS INDEX: 39.83 KG/M2 | WEIGHT: 278.2 LBS

## 2018-01-12 VITALS — WEIGHT: 315 LBS | BODY MASS INDEX: 45.1 KG/M2 | HEIGHT: 70 IN

## 2018-01-12 NOTE — RESULT NOTES
[facility]   Your test results have returned and are listed below:      Discussion/Summary  Your results are normal  Please keep your regularly scheduled follow-up appointment  If you do not have a follow-up scheduled, please call the office to schedule a follow-up visit  If you have any questions, please don't hesitate to call the office     Sincerely,      Signatures   Electronically signed by : CLARA SchraderRD; May  4 2017  3:07PM EST                       (Author)    Electronically signed by : Chalmer Apley, M D ; May  4 2017  3:25PM EST                       (Validation)

## 2018-01-13 VITALS
TEMPERATURE: 97.5 F | SYSTOLIC BLOOD PRESSURE: 122 MMHG | HEART RATE: 88 BPM | RESPIRATION RATE: 22 BRPM | DIASTOLIC BLOOD PRESSURE: 80 MMHG | BODY MASS INDEX: 45.1 KG/M2 | WEIGHT: 315 LBS | HEIGHT: 70 IN

## 2018-01-13 VITALS
TEMPERATURE: 98 F | DIASTOLIC BLOOD PRESSURE: 70 MMHG | BODY MASS INDEX: 44.88 KG/M2 | RESPIRATION RATE: 20 BRPM | SYSTOLIC BLOOD PRESSURE: 112 MMHG | WEIGHT: 313.5 LBS | HEART RATE: 80 BPM | HEIGHT: 70 IN

## 2018-01-13 VITALS
HEIGHT: 70 IN | TEMPERATURE: 98.3 F | RESPIRATION RATE: 21 BRPM | SYSTOLIC BLOOD PRESSURE: 102 MMHG | DIASTOLIC BLOOD PRESSURE: 80 MMHG | HEART RATE: 85 BPM | WEIGHT: 315 LBS | BODY MASS INDEX: 45.1 KG/M2

## 2018-01-13 VITALS
DIASTOLIC BLOOD PRESSURE: 66 MMHG | RESPIRATION RATE: 18 BRPM | BODY MASS INDEX: 36.58 KG/M2 | TEMPERATURE: 98 F | HEART RATE: 80 BPM | SYSTOLIC BLOOD PRESSURE: 122 MMHG | HEIGHT: 70 IN | WEIGHT: 255.5 LBS

## 2018-01-14 VITALS
HEIGHT: 70 IN | RESPIRATION RATE: 22 BRPM | SYSTOLIC BLOOD PRESSURE: 110 MMHG | DIASTOLIC BLOOD PRESSURE: 68 MMHG | BODY MASS INDEX: 41.59 KG/M2 | WEIGHT: 290.5 LBS | TEMPERATURE: 97.2 F | HEART RATE: 80 BPM

## 2018-01-15 NOTE — PROGRESS NOTES
History of Present Illness  Bariatric Behavioral Health Evaluation [facility]:   He is here today because: Increase health, increase mobility, reduce chronic pain, prevent family diseases  He is seeking a Bariatric surgery evaluation  He researched this option for: 6 months really researching  He realizes the post-op requirements has discussed with his physician   His Psychiatric/Psychological diagnosis: He does not have an outpatient counselor  He does not have a Psychiatrist    He has not had Inpatient Treatment  Family Constellation: Family Constellation: Mother: obesity history, hypothyroidism,  Father: obesity, hypertension, diabetes, heart disease, heart failure, tobacco use history  Siblings: overweight  Spouse: n/a  Children: n/a  He lives alone  Domestic Violence: has not happened  Abuse History: (denies childhood trauma)   Additional Comments: health, weight, work, financial    Physical/psychological assessment Appearance: appropriate   Sociability: average  Affect: appropriate  Mood: calm  Thought Process: coherent  Speech: normal  Content: no impairment  The patient was oriented to person, oriented to place, oriented to time and normal memory   normal attention span  no decreased concentration ability  normal judgment  His emotional insight was: fair  His intellectual insight was: fair  Risk assessment: The patient is currently asymptomatic  No associated symptoms are reported  Sexual history: He does not have a history of STD's  Recommendations: He is recommended for surgery  The Following Ratings are based on my: Obsevation of this individual over the last  Risk of Harm to Self or Others: The following are demographic risk factors associated with harm to self: , [place], or Native [place] and male  The following are demographic risk factors associated with harm to others: male  (n/a)  Recent Specific Risk Factors: The patient is currently asymptomatic   No associated symptoms are reported  Access to Weapons:   He has access to the following weapons: knife and sward collection   Summary and Recommendations:   Low: No thoughts or occasional thoughts of suicide, but no intent or actions  Self inflicted scratches, abrasions, or other self- injurious of behavior where medical attention is typically not warranted  No elements of homicidality or occasional thoughts but no plan, intent, or actions   Note   Note:   Patient denies Axis I diagnosis or treatment  Patient educated regarding the impact of nicotine and alcohol on the post bariatric surgery patient  Patient meets criteria for surgery at this program and is referred to physician  Signatures   Electronically signed by :  Jeffery Weathers LCSWMSASHLI,CHAZ; Feb 28 2017  1:55PM EST                       (Author)    Electronically signed by : TRU Silverman ; Mar  2 2017  1:04PM EST                       (Validation)

## 2018-01-15 NOTE — MISCELLANEOUS
Message  6/23/2017 @ 1110- post op follow up phone call completed  Pt is sipping liquids, using IS as instructed, reinforced importance of using IS to help prevent pneumonia  Ambulating about home without difficulty  Pain controlled with analgesia  Reaffirmed examples of liquid diet over the next week  Pt stated understanding about discharge instructions and medication adjustments  Tolerating self administration of lovenox injections without difficulty  Pt educated on [address]  Follow up appt with surgeon scheduled for next week  Instructed to call with any additional questions or concerns  Active Problems    1  Morbid obesity (278 01) (E66 01)   2  Preop cardiovascular exam (V72 81) (Z01 810)    Current Meds   1  Enoxaparin Sodium 40 MG/0 4ML Subcutaneous Solution (Lovenox); inject once daily for   2 weeks post surgery; Therapy: 53EJB4246 to (Last Rx:01Jun2017)  Requested for: 01Jun2017 Ordered   2  Enoxaparin Sodium 40 MG/0 4ML Subcutaneous Solution (Lovenox); inject once daily for   2 weeks post surgery; Therapy: 10ZIR1853 to (Last Rx:15Jun2017)  Requested for: 79HIE2433 Ordered   3  Omeprazole 20 MG Oral Capsule Delayed Release; take 1 capsule daily; Therapy: 76MBC9656 to (Evaluate:26Gkc3456)  Requested for: 84TOX1223; Last   Rx:01Jun2017 Ordered   4  Oxycodone-Acetaminophen 5-325 MG Oral Tablet (Percocet); TAKE 1 TABLET EVERY 4   TO 6 HOURS AS NEEDED FOR PAIN;   Therapy: 66JEG1671 to (Evaluate:92Smp7776); Last BE:12YIA6329 Ordered    Allergies    1   No Known Drug Allergies    Signatures   Electronically signed by : Emliy Hernandez, ; Jun 23 2017 11:54AM EST                       (Author)

## 2018-01-15 NOTE — MISCELLANEOUS
Message  Return to work or school:   Siri Kee is under my professional care  He was seen in my office on 06/20/2017   He is able to return to work on  07/10/2017       NO RESTRICTIONS        Signatures   Electronically signed by : TRU Luong ; Jul 12 2017  2:38PM EST                       (Validation)

## 2018-01-15 NOTE — PROGRESS NOTES
Discussion/Summary  Discussion Summary:   Reviewed post-operative pureed and soft foods diet with pt  Discussed gradual diet advancement and portion size progression  Discussed adequate hydration, signs and symptoms of dehydration  Discussed recommended post-operative vitamin supplementation and protein supplements  Discussed importance of regular physical activity  Provided pt with contact information for future questions/concerns  Active Problems    1  Morbid obesity (278 01) (E66 01)   2  Postgastrectomy malabsorption (579 3) (K91 2,Z90 3)   3  Preop cardiovascular exam (V72 81) (Z01 810)   4  Status post laparoscopic sleeve gastrectomy (V45 86) (J59 33)    Past Medical History    1  History of No significant past medical history    Surgical History    1  History of Gastrectomy Sleeve Laparoscopic   2  History of Oral Surgery Tooth Extraction   3  History of Tonsillectomy    Family History  Mother    1  Family history of hypothyroidism (V18 19) (Z83 49)  Father    2  Family history of cardiac disorder (V17 49) (Z82 49)   3  Family history of diabetes mellitus (V18 0) (Z83 3)   4  Family history of hypertension (V17 49) (Z82 49)    Social History    · Denied: History of Drug use   · Never a smoker   · Occasional alcohol use    Current Meds   1  Bariatric Fusion Oral Tablet Chewable; Therapy: (Franck Perla) to Recorded   2  Calcium CHEW;   Therapy: (Franck Perla) to Recorded   3  Enoxaparin Sodium 40 MG/0 4ML Subcutaneous Solution (Lovenox); inject once daily for   2 weeks post surgery; Therapy: 94UWM7527 to (Last Rx:15Jun2017)  Requested for: 62RGG8475 Ordered   4  Omeprazole 20 MG Oral Capsule Delayed Release; take 1 capsule daily; Therapy: 89CKK5527 to (Evaluate:36Ghi9424)  Requested for: 58GKS0023; Last   Rx:01Jun2017 Ordered   5   Oxycodone-Acetaminophen 5-325 MG Oral Tablet (Percocet); TAKE 1 TABLET EVERY 4   TO 6 HOURS AS NEEDED FOR PAIN;   Therapy: 53PSU0979 to (Evaluate:60Ohx2102); Last DR:64BVB3496 Ordered    Allergies    1   No Known Drug Allergies    Future Appointments    Date/Time Provider Specialty Site   09/29/2017 03:00 PM Romie Arreaga, [facility] General Surgery [facility]   08/02/2017 03:00 PM CARISSA Banerjee, RD Dietitian [facility]     Signatures   Electronically signed by : CARISSA Nam; Jun 29 2017 11:39AM EST                       (Author)    Electronically signed by : TRU Maher ; Jun 29 2017  2:47PM EST                       (Validation)

## 2018-01-23 VITALS
DIASTOLIC BLOOD PRESSURE: 64 MMHG | BODY MASS INDEX: 33.71 KG/M2 | WEIGHT: 235.5 LBS | TEMPERATURE: 98 F | HEART RATE: 82 BPM | HEIGHT: 70 IN | RESPIRATION RATE: 18 BRPM | SYSTOLIC BLOOD PRESSURE: 122 MMHG

## 2018-01-23 NOTE — RESULT NOTES
Discussion/Summary   December 2017 labs reviewed   Your vitamin D is low at 27 6- this is important for bone health-add a 2000 IU vitamin D3 daily to your current supplements-this is available over-the-counter      Your folate level is high> 20  This is generally safe and o k  Please keep your routine office visit  If you do not have a scheduled routine appointment, please call the office to schedule it  Our office number is [phone]  If you have questions about your results, this will be discussed with you at your upcoming  visit  If you have gotten your most recent labs after your recent visit and have questions, please call the office  I look forward to seeing you at your next visit  Verified Results  (1) CBC/ PLT (NO DIFF) 41Iax1393 07:07AM Judy Cooley Order Number: [id number]     Test Name Result Flag Reference   HEMATOCRIT 48 6 %  36 5-49 3   HEMOGLOBIN 16 2 g/dL  12 0-17 0   MCHC 33 3 g/dL  31 4-37 4   MCH 29 0 pg  26 8-34 3   MCV 87 fL  82-98   PLATELET COUNT 922 Thousands/uL  149-390   RBC COUNT 5 58 Million/uL  3 88-5 62   RDW 13 2 %  11 6-15 1   WBC COUNT 5 57 Thousand/uL  4 31-10 16   MPV 10 4 fL  8 9-12 7     (1) COMPREHENSIVE METABOLIC PANEL 10DFU0395 35:12BX Judy Cooley Order Number: [id number]     Test Name Result Flag Reference   SODIUM 139 mmol/L  136-145   POTASSIUM 4 2 mmol/L  3 5-5 3   CHLORIDE 104 mmol/L  100-108   CARBON DIOXIDE 32 mmol/L  21-32   ANION GAP (CALC) 3 mmol/L L 4-13   BLOOD UREA NITROGEN 9 mg/dL  5-25   CREATININE 0 82 mg/dL  0 60-1 30   Standardized to IDMS reference method   CALCIUM 9 3 mg/dL  8 3-10 1   BILI, TOTAL 0 79 mg/dL  0 20-1 00   ALK PHOSPHATAS 90 U/L     ALT (SGPT) 16 U/L  12-78   Specimen collection should occur prior to Sulfasalazine and/or Sulfapyridine administration due to the potential for falsely depressed results     AST(SGOT) 13 U/L  5-45   Specimen collection should occur prior to Sulfasalazine administration due to the potential for falsely depressed results  ALBUMIN 3 4 g/dL L 3 5-5 0   TOTAL PROTEIN 7 3 g/dL  6 4-8 2   eGFR 115 ml/min/1 73sq m     [facility] Disease Education Program recommendations are as follows:  GFR calculation is accurate only with a steady state creatinine  Chronic Kidney disease less than 60 ml/min/1 73 sq  meters  Kidney failure less than 15 ml/min/1 73 sq  meters  GLUCOSE FASTING 90 mg/dL  65-99   Specimen collection should occur prior to Sulfasalazine administration due to the potential for falsely depressed results  Specimen collection should occur prior to Sulfapyridine administration due to the potential for falsely elevated results  (1) FERRITIN 22Dec2017 07:07AM TRAFI Order Number: [id number]     Test Name Result Flag Reference   FERRITIN 229 ng/mL  8-388     (1) FOLATE 22Dec2017 07:07AM TRAFI Order Number: [id number]     Test Name Result Flag Reference   FOLATE >20 0 ng/mL H 3 1-17 5     (1) LIPID PANEL, FASTING 22Dec2017 07:07AM TRAFI Order Number: [id number]     Test Name Result Flag Reference   CHOLESTEROL 155 mg/dL     HDL,DIRECT 46 mg/dL  40-60   Specimen collection should occur prior to Metamizole administration due to the potential for falsley depressed results  LDL CHOLESTEROL CALCULATED 88 mg/dL  0-100   Triglyceride:        Normal <150 mg/dl   Borderline High 150-199 mg/dl   High 200-499 mg/dl   Very High >499 mg/dl      Cholesterol:       Desirable <200 mg/dl    Borderline High 200-239 mg/dl    High >239 mg/dl      HDL Cholesterol:       High>59 mg/dL    Low <41 mg/dL      This screening LDL is a calculated result  It does not have the accuracy of the Direct Measured LDL in the monitoring of patients with hyperlipidemia and/or statin therapy  Direct Measure LDL (TQE904) must be ordered separately in these patients     TRIGLYCERIDES 105 mg/dL <=150   Specimen collection should occur prior to N-Acetylcysteine or Metamizole administration due to the potential for falsely depressed results  (1) PTH N-TERMINAL (INTACT) 74Nmb3247 07:07AM Chip Schlossman Order Number: [id number]     Test Name Result Flag Reference   PARATHYROID HORMONE INTACT 38 4 pg/mL  14 0-72 0     (1) VITAMIN B12 41Aqs3065 07:07AM Chip Schlossman Order Number: [id number]     Test Name Result Flag Reference   VITAMIN B12 506 pg/mL  100-900     (1) VITAMIN D 25-HYDROXY 50Art5599 07:07AM Chip Schlossman Order Number: [id number]     Test Name Result Flag Reference   VIT D 25-HYDROX 27 6 ng/mL L 30 0-100 0   This assay is a certified procedure of the CDC Vitamin D Standardization Certification Program (VDSCP)     Deficiency <20ng/ml   Insufficiency 20-30ng/ml   Sufficient  ng/ml     *Patients undergoing fluorescein dye angiography may retain small amounts of fluorescein in the body for 48-72 hours post procedure  Samples containing fluorescein can produce falsely elevated Vitamin D values  If the patient had this procedure, a specimen should be resubmitted post fluorescein clearance

## 2018-01-23 NOTE — RESULT NOTES
Verified Results  (1) VITAMIN A 51Ufw7828 07:07AM Florence Zandra Order Number: [id number]     Test Name Result Flag Reference   VITAMIN A 34 ug/dL  24 - 85   Performed at:  [facility],[address], [facility] [place] [facility]  [id number]  : Tab Lockwood MD, Phone:  [phone]     (1) VITAMIN B1, WHOLE BLOOD 32Ftc8737 07:07AM Florence Zandra Order Number: [id number]     Test Name Result Flag Reference   VITAMIN B1, WHOLE BLOOD 147 7 nmol/L  66 5 - 200 0   This test was developed and its performance characteristics  determined by [facility]  It has not been cleared or approved  by the Food and Drug Administration      Performed at:  [address]  [id number]  : Tab Lockwood MD, Phone:  [phone]

## 2018-02-10 ENCOUNTER — HOSPITAL ENCOUNTER (EMERGENCY)
Facility: HOSPITAL | Age: 33
Discharge: HOME | End: 2018-02-10
Attending: EMERGENCY MEDICINE | Admitting: EMERGENCY MEDICINE
Payer: COMMERCIAL

## 2018-02-10 ENCOUNTER — APPOINTMENT (EMERGENCY)
Dept: CT IMAGING | Facility: HOSPITAL | Age: 33
End: 2018-02-10
Payer: COMMERCIAL

## 2018-02-10 VITALS
RESPIRATION RATE: 18 BRPM | SYSTOLIC BLOOD PRESSURE: 144 MMHG | DIASTOLIC BLOOD PRESSURE: 81 MMHG | WEIGHT: 234 LBS | HEART RATE: 87 BPM | TEMPERATURE: 97.7 F | BODY MASS INDEX: 33.86 KG/M2 | OXYGEN SATURATION: 100 %

## 2018-02-10 DIAGNOSIS — S06.0X9A CONCUSSION: ICD-10-CM

## 2018-02-10 DIAGNOSIS — S09.90XA TRAUMATIC INJURY OF HEAD, INITIAL ENCOUNTER: Primary | ICD-10-CM

## 2018-02-10 PROCEDURE — 99284 EMERGENCY DEPT VISIT MOD MDM: CPT

## 2018-02-10 PROCEDURE — 72125 CT NECK SPINE W/O DYE: CPT

## 2018-02-10 PROCEDURE — 70450 CT HEAD/BRAIN W/O DYE: CPT

## 2018-02-10 RX ORDER — OXYCODONE HYDROCHLORIDE AND ACETAMINOPHEN 5; 325 MG/1; MG/1
1 TABLET ORAL ONCE
Status: COMPLETED | OUTPATIENT
Start: 2018-02-10 | End: 2018-02-10

## 2018-02-10 RX ORDER — OXYCODONE HYDROCHLORIDE AND ACETAMINOPHEN 5; 325 MG/1; MG/1
1 TABLET ORAL EVERY 6 HOURS PRN
Qty: 10 TABLET | Refills: 0 | Status: SHIPPED | OUTPATIENT
Start: 2018-02-10 | End: 2018-02-13

## 2018-02-10 RX ADMIN — OXYCODONE HYDROCHLORIDE AND ACETAMINOPHEN 1 TABLET: 5; 325 TABLET ORAL at 22:04

## 2018-02-10 RX ADMIN — OXYCODONE AND ACETAMINOPHEN 1 TABLET: 5; 325 TABLET ORAL at 23:49
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
## 2018-02-11 NOTE — DISCHARGE INSTRUCTIONS
Concussion   WHAT YOU NEED TO KNOW:   What is a concussion? A concussion is a mild brain injury  It is usually caused by a bump or blow to the head from a fall, a motor vehicle crash, or a sports injury  Being shaken forcefully may also cause a concussion  What are the signs and symptoms of a concussion? Symptoms may occur right away, or they may appear days after the concussion  After the injury, you may have any of these symptoms:  · A mild to moderate headache    · Dizziness, loss of balance, or blurry vision    · Nausea or vomiting     · A change in mood, such as restlessness or irritability    · Trouble thinking, remembering things, or concentrating    · Ringing in the ears    · Drowsiness or decreased energy    · Changes in your normal sleeping pattern  How is a concussion diagnosed? Your healthcare provider will ask how you were injured, and about your symptoms  He will also examine you  You may need any of the following:  · A neurologic exam  is also called neuro signs, neuro checks, or neuro status  A neurologic exam can show healthcare providers how well your brain works after your injury  Healthcare providers will check how your pupils (black dots in the center of each eye) react to light  They may check your memory and how easily you wake up  Your hand grasp and balance may also be tested  · A CT, or MRI  of your head may be done  You may be given contrast liquid to help the pictures show up better  Tell the healthcare provider if you have ever had an allergic reaction to contrast liquid  Do not enter the MRI room with anything metal  Metal can cause serious injury  Tell the healthcare provider if you have any metal in or on your body  How is a concussion managed? Usually no treatment is needed for a mild concussion  Concussion symptoms usually go away within about 10 days  The following may be recommended to manage your symptoms:  · Rest  from physical and mental activities as directed  Mental activities are those that require thinking, concentration, and attention  You will need to rest until your symptoms are gone  Rest will allow you to recover from your concussion  Ask your healthcare provider when you can return to work and other daily activities  · Have someone stay with you for the first 24 hours after your injury  Your healthcare provider should be contacted if your symptoms get worse, or you develop new symptoms  · Do not participate in sports and physical activities until your healthcare provider says it is okay  They could make your symptoms worse or lead to another concussion  Your healthcare provider will tell you when it is okay for you to return to sports or physical activities  · Acetaminophen  may help to decrease headache pain  It is available without a doctor's order  Ask how much to take and how often to take it  Follow directions  Acetaminophen can cause liver damage if not taken correctly  · NSAIDs , such as ibuprofen, help decrease swelling and pain  NSAIDs can cause stomach bleeding or kidney problems in certain people  If you take blood thinner medicine, always ask your healthcare provider if NSAIDs are safe for you  Always read the medicine label and follow directions  How can I help prevent another concussion? · Wear protective sports equipment that fit properly  Helmets help decrease your risk of a serious brain injury  Talk to your healthcare provider about ways you can decrease your risk for a concussion if you play sports  · Wear your seat belt  every time you travel  This helps to decrease your risk for a head injury if you are in a car accident  Have someone else call 911 for the following:   · Someone tries to wake you and cannot do so  · You have a seizure, increasing confusion, or a change in personality  · Your speech becomes slurred, or you have new vision problems  When should I seek immediate care?    · You have a severe headache that does not go away  · You have arm or leg weakness, numbness, or new problems with coordination  · You have blood or clear fluid coming out of the ears or nose  When should I contact my healthcare provider? · You have nausea or are vomiting  · You feel more sleepy than usual     · Your symptoms get worse  · Your symptoms last longer than 6 weeks after the injury  · You have questions or concerns about your condition or care  CARE AGREEMENT:   You have the right to help plan your care  Learn about your health condition and how it may be treated  Discuss treatment options with your caregivers to decide what care you want to receive  You always have the right to refuse treatment  The above information is an  only  It is not intended as medical advice for individual conditions or treatments  Talk to your doctor, nurse or pharmacist before following any medical regimen to see if it is safe and effective for you  © 2017 [employer] Information is for End User's use only and may not be sold, redistributed or otherwise used for commercial purposes  All illustrations and images included in CareNotes® are the copyrighted property of [employer]  or [address]  Head Injury   WHAT YOU NEED TO KNOW:   A head injury is most often caused by a blow to the head  This may occur from a fall, bicycle injury, sports injury, being struck in the head, or a motor vehicle accident  DISCHARGE INSTRUCTIONS:   Call 911 or have someone else call for any of the following:   · You cannot be woken  · You have a seizure  · You stop responding to others or you faint  · You have blurry or double vision  · Your speech becomes slurred or confused  · You have arm or leg weakness, loss of feeling, or new problems with coordination  · Your pupils are larger than usual or one pupil is a different size than the other       · You have blood or clear fluid coming out of your ears or nose  Return to the emergency department if:   · You have repeated or forceful vomiting  · You feel confused  · Your headache gets worse or becomes severe  · You or someone caring for you notices that you are harder to wake than usual   Contact your healthcare provider if:   · Your symptoms last longer than 6 weeks after the injury  · You have questions or concerns about your condition or care  Medicines:   · Acetaminophen  decreases pain  Acetaminophen is available without a doctor's order  Ask how much to take and how often to take it  Follow directions  Acetaminophen can cause liver damage if not taken correctly  · Take your medicine as directed  Contact your healthcare provider if you think your medicine is not helping or if you have side effects  Tell him or her if you are allergic to any medicine  Keep a list of the medicines, vitamins, and herbs you take  Include the amounts, and when and why you take them  Bring the list or the pill bottles to follow-up visits  Carry your medicine list with you in case of an emergency  Self-care:   · Rest  or do quiet activities for 24 to 48 hours  Limit your time watching TV, using the computer, or doing tasks that require a lot of thinking  Slowly return to your normal activities as directed  Do not play sports or do activities that may cause you to get hit in the head  Ask your healthcare provider when you can return to sports  · Apply ice  on your head for 15 to 20 minutes every hour or as directed  Use an ice pack, or put crushed ice in a plastic bag  Cover it with a towel before you apply it to your skin  Ice helps prevent tissue damage and decreases swelling and pain  · Have someone stay with you for 24 hours  or as directed  This person can monitor you for complications and call 544  When you are awake the person should ask you a few questions to see if you are thinking clearly   An example would be to ask your name or your address  Prevent another head injury:   · Wear a helmet that fits properly  Do this when you play sports, or ride a bike, scooter, or skateboard  Helmets help decrease your risk of a serious head injury  Talk to your healthcare provider about other ways you can protect yourself if you play sports  · Wear your seat belt every time you are in a car  This helps to decrease your risk for a head injury if you are in a car accident  Follow up with your healthcare provider as directed:  Write down your questions so you remember to ask them during your visits  © 2017 [employer] Information is for End User's use only and may not be sold, redistributed or otherwise used for commercial purposes  All illustrations and images included in CareNotes® are the copyrighted property of [employer]  or [employer]  The above information is an  only  It is not intended as medical advice for individual conditions or treatments  Talk to your doctor, nurse or pharmacist before following any medical regimen to see if it is safe and effective for you

## 2018-02-11 NOTE — ED PROVIDER NOTES
History  Chief Complaint   Patient presents with    Skiing Accident     pt presents ambulatory with c/o head injury skiing denies LOC reports short term memory loss "about an hour after the incident" neg nausea PERRL, CA&Ox4  was not wearing helmet when he fell  75-year-old male presents for evaluation of traumatic injury to his head  He was skiing earlier today, had a fall where he hit his head  He denies loss of consciousness at that time however he has confusion and a 1 hour period of memory loss  He does not remember skiing after the event  He went to [facility] to be evaluated and they advised him that he seemed normal at that time however come to the emergency department if any worsening conditions  Patient has a worsening headache and presents to the ER for evaluation and treatment  No prior history of concussions in the past   Does not take blood thinners  Past surgical history of gastric bypass for weight loss  Prior to Admission Medications   Prescriptions Last Dose Informant Patient Reported? Taking?   enoxaparin (LOVENOX) 40 mg/0 4 mL   No No   Sig: Inject 0 4 mL under the skin every 24 hours for 13 days   multivitamin (THERAGRAN) TABS   Yes No   Sig: Take 1 tablet by mouth daily   omeprazole (PriLOSEC) 20 mg delayed release capsule   No No   Sig: Take 1 capsule by mouth daily      Facility-Administered Medications: None       Past Medical History:   Diagnosis Date    Allergy to environmental factors     As a child patient developed a rash from bleach    Asthma     States weight induced and no medications    Full dentures     Edentulous- only wears top denture    Migraine     Morbid obesity (HCC)     Rash     Has rash that now has scabs from itching   States it was biopsied and was told it was an allergic reaction possibly to detergent    Wears glasses        Past Surgical History:   Procedure Laterality Date    DENTAL SURGERY      ESOPHAGOGASTRODUODENOSCOPY N/A 6/20/2017    Procedure: ESOPHAGOGASTRODUODENOSCOPY (EGD); Surgeon: Belkys Bojorquez MD;  [facility];  Service: Bariatrics    AR EGD TRANSORAL BIOPSY SINGLE/MULTIPLE N/A 5/3/2017    Procedure: ESOPHAGOGASTRODUODENOSCOPY (EGD) with bx;  Surgeon: Belkys Bojorquez MD;  [facility]; Service: Bariatrics    AR LAP, ROSALES RESTRICT PROC, LONGITUDINAL GASTRECTOMY N/A 6/20/2017    Procedure: GASTRECTOMY SLEEVE LAPAROSCOPIC;  Surgeon: Belkys Bojorquez MD;  [facility];  [facility]         History reviewed  No pertinent family history  I have reviewed and agree with the history as documented  Social History   Substance Use Topics    Smoking status: Former Smoker     Years: 10 00     Types: Cigarettes, Cigars     Quit date: 12/20/2016    Smokeless tobacco: Never Used      Comment: Quit cigarettes and cigars- quit over a year ago  Denies heavy smoking    Alcohol use Yes      Comment: 2 or 3 monthly        Review of Systems   Constitutional: Negative for appetite change, chills, fatigue and fever  HENT: Negative for congestion, sinus pain and sore throat  Eyes: Negative for visual disturbance  Respiratory: Negative for cough, chest tightness and shortness of breath  Cardiovascular: Negative for chest pain and palpitations  Gastrointestinal: Negative for abdominal pain, nausea and vomiting  Genitourinary: Negative for flank pain  Musculoskeletal: Negative for back pain, neck pain and neck stiffness  Skin: Negative for color change, rash and wound  Allergic/Immunologic: Negative for immunocompromised state  Neurological: Positive for headaches  Negative for dizziness, syncope (1 hour period of memory loss), facial asymmetry, speech difficulty, weakness, light-headedness and numbness         Physical Exam  ED Triage Vitals [02/10/18 2055]   Temperature Pulse Respirations Blood Pressure SpO2   97 7 °F (36 5 °C) 87 18 144/81 100 %      Temp Source Heart Rate Source Patient Position - Orthostatic VS BP Location FiO2 (%)   Oral Monitor -- -- --      Pain Score       8           Orthostatic Vital Signs  Vitals:    02/10/18 2055   BP: 144/81   Pulse: 87       Physical Exam   Constitutional: He is oriented to person, place, and time  He appears well-developed and well-nourished  No distress  HENT:   Head: Normocephalic and atraumatic  Right Ear: External ear normal    Left Ear: External ear normal    Mouth/Throat: Oropharynx is clear and moist    Eyes: Conjunctivae and EOM are normal  Pupils are equal, round, and reactive to light  Right eye exhibits no discharge  Left eye exhibits no discharge  No scleral icterus  Neck: Normal range of motion  Neck supple  No JVD present  Cardiovascular: Normal rate, regular rhythm, normal heart sounds and intact distal pulses  Exam reveals no gallop and no friction rub  No murmur heard  Pulmonary/Chest: Effort normal and breath sounds normal  No respiratory distress  He has no wheezes  He has no rales  He exhibits no tenderness  Abdominal: Soft  Bowel sounds are normal  He exhibits no distension  There is no tenderness  There is no rebound and no guarding  Musculoskeletal: Normal range of motion  He exhibits no edema, tenderness or deformity  Neurological: He is alert and oriented to person, place, and time  No cranial nerve deficit or sensory deficit  Headache, especially the superior portion of the head  Skin: Skin is warm and dry  No rash noted  He is not diaphoretic  No erythema  No pallor  Psychiatric: He has a normal mood and affect  His behavior is normal    Vitals reviewed        ED Medications  Medications   oxyCODONE-acetaminophen (PERCOCET) 5-325 mg per tablet 1 tablet (not administered)   oxyCODONE-acetaminophen (PERCOCET) 5-325 mg per tablet 1 tablet (1 tablet Oral Given 2/10/18 2204)       Diagnostic Studies  Results Reviewed     None                 CT spine cervical without contrast   ED Interpretation by Liza Arreola DO (02/10 2342)   No cervical spine fracture  CT head without contrast   ED Interpretation by Liza Arreola DO (02/10 2341)   No cervical spine fracture  No acute intracranial hemorrhage  Procedures  Procedures       Phone Contacts  ED Phone Contact    ED Course  ED Course as of Feb 10 2344   Sat Feb 10, 2018   2319 Patient is feeling mildly improved but states that he still has pain after the Percocet  Will attempt more pain control  MDM  Number of Diagnoses or Management Options  Concussion:   Traumatic injury of head, initial encounter:   Diagnosis management comments: Traumatic injury to the head  The will evaluate with CT head, CT cervical spine, will provide Percocet for pain control  CT scans are negative  Advised follow-up with primary care for symptoms of concussion on for follow-up  Advised good return precautions in case of new or worsening neurologic condition  Amount and/or Complexity of Data Reviewed  Tests in the radiology section of CPT®: ordered and reviewed      CritCare Time    Disposition  Final diagnoses:   Traumatic injury of head, initial encounter   Concussion     Time reflects when diagnosis was documented in both MDM as applicable and the Disposition within this note     Time User Action Codes Description Comment    2/10/2018 11:42 PM Aishwarya Cantu Add [S09 90XA] Traumatic injury of head, initial encounter     2/10/2018 11:42 PM Aishwarya Cantu Add [S06 0X9A] Concussion       ED Disposition     ED Disposition Condition Comment    Discharge  Jese Ask discharge to home/self care      Condition at discharge: Good        Follow-up Information     Follow up With Specialties Details Why Contact Info Additional [facility] Emergency Department Emergency Medicine  If symptoms worsen:  Worsening headache, seizure, passing out, numbness near extremities, weakness, etc  [address] [facility]  [place] [address]  [phone] [place] ED, [address] [place], [place], [address] [facility] DO [facility] Schedule an appointment as soon as possible for a visit For follow-up regarding concussion symptoms  [address]  [facility] [address]  [phone]           Patient's Medications   Discharge Prescriptions    OXYCODONE-ACETAMINOPHEN (PERCOCET) 5-325 MG PER TABLET    Take 1 tablet by mouth every 6 (six) hours as needed for severe pain for up to 3 days Max Daily Amount: 4 tablets       Start Date: 2/10/2018 End Date: 2/13/2018       Order Dose: 1 tablet       Quantity: 10 tablet    Refills: 0     No discharge procedures on file      ED Provider  Electronically Signed by           Maria Esther Byrd DO  02/10/18 0145

## 2018-06-04 ENCOUNTER — APPOINTMENT (EMERGENCY)
Dept: CT IMAGING | Facility: HOSPITAL | Age: 33
End: 2018-06-04
Payer: COMMERCIAL

## 2018-06-04 ENCOUNTER — HOSPITAL ENCOUNTER (EMERGENCY)
Facility: HOSPITAL | Age: 33
Discharge: HOME | End: 2018-06-04
Attending: EMERGENCY MEDICINE | Admitting: EMERGENCY MEDICINE
Payer: COMMERCIAL

## 2018-06-04 VITALS
DIASTOLIC BLOOD PRESSURE: 91 MMHG | OXYGEN SATURATION: 98 % | RESPIRATION RATE: 18 BRPM | HEART RATE: 72 BPM | SYSTOLIC BLOOD PRESSURE: 133 MMHG

## 2018-06-04 DIAGNOSIS — S02.2XXA CLOSED FRACTURE OF NASAL BONE, INITIAL ENCOUNTER: ICD-10-CM

## 2018-06-04 DIAGNOSIS — K91.2 POSTSURGICAL MALABSORPTION, NOT ELSEWHERE CLASSIFIED (CODE): ICD-10-CM

## 2018-06-04 DIAGNOSIS — S09.90XA INJURY OF HEAD, INITIAL ENCOUNTER: Primary | ICD-10-CM

## 2018-06-04 DIAGNOSIS — Z98.84 BARIATRIC SURGERY STATUS: ICD-10-CM

## 2018-06-04 DIAGNOSIS — E55.9 VITAMIN D DEFICIENCY: ICD-10-CM

## 2018-06-04 DIAGNOSIS — E66.9 OBESITY: ICD-10-CM

## 2018-06-04 PROCEDURE — 70486 CT MAXILLOFACIAL W/O DYE: CPT

## 2018-06-04 PROCEDURE — 70450 CT HEAD/BRAIN W/O DYE: CPT

## 2018-06-04 PROCEDURE — 99283 EMERGENCY DEPT VISIT LOW MDM: CPT

## 2018-06-04 NOTE — DISCHARGE INSTRUCTIONS
Nasal Fracture   WHAT YOU NEED TO KNOW:   A nasal fracture is a crack or break in your nose  You may have a break in the upper nose (bridge), the side, or in the septum  The septum is in the middle of the nose and divides your nostrils  Nasal fractures are caused by a hard hit to the nose  They may be caused by a motor vehicle crash, sports injury, fall, or a fight  DISCHARGE INSTRUCTIONS:   Return to the emergency department if:   · You feel like one or both of your nasal passages are blocked and you have trouble breathing  · Clear fluid is leaking from your nose  · Your have severe nose pain, even after you take medicine  · You have double vision or have problems moving your eyes  Contact your healthcare provider if:   · You have a fever  · You continue to have nosebleeds  · You have a headache that gets worse, even after you take pain medicine  · Your splint or packing are loose  · You have questions about your condition or care  Medicines:   · Medicine  may be given to decrease pain or help prevent a bacterial infection  Ask how to take pain medicine safely  Medicine may also be given to decrease nasal swelling and help make breathing easier  · Take your medicine as directed  Contact your healthcare provider if you think your medicine is not helping or if you have side effects  Tell him or her if you are allergic to any medicine  Keep a list of the medicines, vitamins, and herbs you take  Include the amounts, and when and why you take them  Bring the list or the pill bottles to follow-up visits  Carry your medicine list with you in case of an emergency  Wound care:  Ask your healthcare provider how to care for your wounds, splint, or packing  How to care for your nasal fracture:   · Apply ice  on your nose for 15 to 20 minutes every hour or as directed  Use an ice pack, or put crushed ice in a plastic bag  Cover it with a towel   Ice helps prevent tissue damage and decreases swelling and pain  · Elevate  your head when you lie down  This will help decrease swelling and pain  You may need to see a specialist 3 to 5 days later for tests or more treatment after swelling has decreased  · Protect your nose  to prevent bleeding, bruising, or another fracture  Try not to bump your nose on anything  You may not be able to participate in sports for up to 6 weeks  Follow up with a specialist or your healthcare provider in 2 to 5 days as directed:  Write down any questions you have so you remember to ask them during your visits  Sometimes follow-up care is needed months or even years later to correct problems  © 2017 [employer]  Information is for End User's use only and may not be sold, redistributed or otherwise used for commercial purposes  All illustrations and images included in CareNotes® are the copyrighted property of A D A M , Inc  or [employer]  The above information is an  only  It is not intended as medical advice for individual conditions or treatments  Talk to your doctor, nurse or pharmacist before following any medical regimen to see if it is safe and effective for you

## 2018-06-04 NOTE — ED PROVIDER NOTES
History  Chief Complaint   Patient presents with    Head Injury     Pt ambulatory on unit C/O head injury after getting hit by another player during volleyball  Denies LOC  C/O head pain and dizziness  75-year-old male presents today complaining of facial pain after head injury  States he was playing volleyball work and collided with another player  Did not get knocked out  Is complaining of pain in his left face just below his eye and lateral to his nose  Did have an episode of epistaxis which has since resolved  No loss of consciousness  Denies headache, vision disturbance, nausea, or ataxia  Presents today because he has had concussions before and wanted to get checked out  Head Injury w/unknown LOC   Location:  Frontal  Time since incident:  2 hours  Mechanism of injury: sports    Pain details:     Quality:  Aching    Radiates to: Face    Severity:  Mild    Timing:  Constant    Progression:  Unchanged  Chronicity:  New  Relieved by:  None tried  Worsened by:  Nothing  Ineffective treatments:  None tried  Associated symptoms: no blurred vision, no difficulty breathing, no disorientation, no double vision, no focal weakness, no headaches, no hearing loss, no loss of consciousness, no memory loss, no nausea, no neck pain, no numbness, no seizures, no tinnitus and no vomiting    Risk factors: no alcohol use, no aspirin use, not elderly, no obesity and no occupational exposure        Prior to Admission Medications   Prescriptions Last Dose Informant Patient Reported?  Taking?   enoxaparin (LOVENOX) 40 mg/0 4 mL   No No   Sig: Inject 0 4 mL under the skin every 24 hours for 13 days   multivitamin (THERAGRAN) TABS 6/4/2018 at Unknown time  Yes Yes   Sig: Take 1 tablet by mouth daily   omeprazole (PriLOSEC) 20 mg delayed release capsule   No No   Sig: Take 1 capsule by mouth daily      Facility-Administered Medications: None       Past Medical History:   Diagnosis Date    Allergy to environmental factors     As a child patient developed a rash from bleach    Asthma     States weight induced and no medications    Full dentures     Edentulous- only wears top denture    Migraine     Morbid obesity (HCC)     Rash     Has rash that now has scabs from itching  States it was biopsied and was told it was an allergic reaction possibly to detergent    Wears glasses        Past Surgical History:   Procedure Laterality Date    DENTAL SURGERY      ESOPHAGOGASTRODUODENOSCOPY N/A 6/20/2017    Procedure: ESOPHAGOGASTRODUODENOSCOPY (EGD); Surgeon: Marilyn Parsons MD;  [facility];  Service: Bariatrics    [place] EGD TRANSORAL BIOPSY SINGLE/MULTIPLE N/A 5/3/2017    Procedure: ESOPHAGOGASTRODUODENOSCOPY (EGD) with bx;  Surgeon: Marilyn Parsons MD;  [facility]; Service: Bariatrics    [place] LAP, ROSALES RESTRICT PROC, LONGITUDINAL GASTRECTOMY N/A 6/20/2017    Procedure: GASTRECTOMY SLEEVE LAPAROSCOPIC;  Surgeon: Marilyn Parsons MD;  [facility];  Service: [place] [id number]         History reviewed  No pertinent family history  I have reviewed and agree with the history as documented  Social History   Substance Use Topics    Smoking status: Former Smoker     Years: 10 00     Types: Cigarettes, Cigars     Quit date: 12/20/2016    Smokeless tobacco: Never Used      Comment: Quit cigarettes and cigars- quit over a year ago  Denies heavy smoking    Alcohol use Yes      Comment: 2 or 3 monthly        Review of Systems   Constitutional: Negative for chills, diaphoresis and fatigue  HENT: Negative for hearing loss and tinnitus  Eyes: Negative for blurred vision, double vision and visual disturbance  Respiratory: Negative for chest tightness and shortness of breath  Gastrointestinal: Negative for abdominal pain, nausea and vomiting  Genitourinary: Negative for difficulty urinating  Musculoskeletal: Negative for back pain and neck pain     Neurological: Negative for focal weakness, seizures, loss of consciousness, numbness and headaches  Psychiatric/Behavioral: Negative for confusion and memory loss  Physical Exam  Physical Exam   Constitutional: He is oriented to person, place, and time  He appears well-developed and well-nourished  HENT:   Head:       Mouth/Throat: Uvula is midline, oropharynx is clear and moist and mucous membranes are normal  No tonsillar exudate  Eyes: Pupils are equal, round, and reactive to light  Neck: Normal range of motion  Neck supple  Cardiovascular: Normal rate and regular rhythm  Pulmonary/Chest: Effort normal and breath sounds normal    Abdominal: Soft  Bowel sounds are normal  There is no tenderness  There is no rebound and no guarding  Musculoskeletal: Normal range of motion  Neurological: He is alert and oriented to person, place, and time  Patient moving all extremities equally, no focal neuro deficits noted  Skin: Skin is warm and dry  Capillary refill takes less than 2 seconds  Psychiatric: He has a normal mood and affect  Nursing note and vitals reviewed  Vital Signs  ED Triage Vitals [06/04/18 1338]   Temp Pulse Respirations Blood Pressure SpO2   -- 78 16 129/76 96 %      Temp src Heart Rate Source Patient Position - Orthostatic VS BP Location FiO2 (%)   -- Monitor Sitting Left arm --      Pain Score       4           Vitals:    06/04/18 1338 06/04/18 1457   BP: 129/76 133/91   Pulse: 78 72   Patient Position - Orthostatic VS: Sitting Lying       Visual Acuity      ED Medications  Medications - No data to display    Diagnostic Studies  Results Reviewed     None                 CT facial bones without contrast   Final Result by Remy Henson DO (06/04 1611)      Nondisplaced left nasal bone fracture  Workstation performed: [id number]         CT head without contrast   Final Result by Remy Henson DO (06/04 1603)      No acute intracranial abnormality                    Workstation performed: [id number]                    Procedures  Procedures       Phone Contacts  ED Phone Contact    ED Course                               MDM  Number of Diagnoses or Management Options  Closed fracture of nasal bone, initial encounter: new and requires workup  Injury of head, initial encounter: new and requires workup  Diagnosis management comments: 4:17 PM  CT shows nondisplaced left nasal bone fracture  These findings are consistent with the patient's history and physical exam   No acute intervention required at this time  Patient is stable for discharge  Will DC  Amount and/or Complexity of Data Reviewed  Tests in the radiology section of CPT®: ordered and reviewed  Independent visualization of images, tracings, or specimens: yes    Risk of Complications, Morbidity, and/or Mortality  Presenting problems: moderate  Diagnostic procedures: moderate  Management options: moderate    Patient Progress  Patient progress: stable    CritCare Time    Disposition  Final diagnoses:   Injury of head, initial encounter   Closed fracture of nasal bone, initial encounter     Time reflects when diagnosis was documented in both MDM as applicable and the Disposition within this note     Time User Action Codes Description Comment    6/4/2018  4:15 PM Pooja Rapp Add [S09 90XA] Injury of head, initial encounter     6/4/2018  4:16 PM Pooja Rapp Add [S02  2XXA] Closed fracture of nasal bone, initial encounter       ED Disposition     ED Disposition Condition Comment    Discharge  Shani Ramirez discharge to home/self care  Condition at discharge: Stable        Follow-up Information     Follow up With Specialties Details Why [address], DO General Practice Schedule an appointment as soon as possible for a visit   [address]  [facility] [address]  [phone]            Patient's Medications   Discharge Prescriptions    No medications on file     No discharge procedures on file      ED Provider  Electronically Signed by           Edward Flynn DO  06/04/18 7455

## 2018-06-26 RX ORDER — SUMATRIPTAN 20 MG/1
SPRAY NASAL
COMMUNITY
End: 2019-03-11

## 2018-07-06 ENCOUNTER — OFFICE VISIT (OUTPATIENT)
Dept: BARIATRICS | Facility: CLINIC | Age: 33
End: 2018-07-06
Payer: COMMERCIAL

## 2018-07-06 VITALS
HEART RATE: 70 BPM | WEIGHT: 250 LBS | TEMPERATURE: 98 F | HEIGHT: 70 IN | SYSTOLIC BLOOD PRESSURE: 122 MMHG | DIASTOLIC BLOOD PRESSURE: 70 MMHG | BODY MASS INDEX: 35.79 KG/M2 | RESPIRATION RATE: 18 BRPM

## 2018-07-06 DIAGNOSIS — E55.9 VITAMIN D INSUFFICIENCY: ICD-10-CM

## 2018-07-06 DIAGNOSIS — K91.2 POSTSURGICAL MALABSORPTION: ICD-10-CM

## 2018-07-06 DIAGNOSIS — Z98.84 BARIATRIC SURGERY STATUS: Primary | ICD-10-CM

## 2018-07-06 PROBLEM — E66.01 MORBID OBESITY (HCC): Status: RESOLVED | Noted: 2017-06-21 | Resolved: 2018-07-06

## 2018-07-06 PROCEDURE — 99213 OFFICE O/P EST LOW 20 MIN: CPT | Performed by: PHYSICIAN ASSISTANT

## 2018-07-06 NOTE — PROGRESS NOTES
Assessment/Plan:    Bariatric surgery status  -s/p Vertical Sleeve Gastrectomy with Dr Earline Castellanos on 6/20/2017  Overall doing Fair  He actually gaine to 260 lb and then lost back to 250 lb since his 6 month visit    Initial:334 4 lb  Current: 250 lb  EWL: 52% -which is below average but considering his higher starting bmi this is as expected  Hussein: 235  lb  Current BMI is Body mass index is 36 18 kg/m²  Tolerating a regular diet-yes  Eating at least 60 grams of protein per day-yes  Following 30/60 minute rule with liquids-yes  Drinking at least 64 ounces of fluid per day-close-advised on importance and ways to boost his fluids -especially with hot weather  Drinking carbonated beverages-yes was but cut this out again  Sufficient exercise-yes  Using NSAIDs regularly-no  Using nicotine-no  Using alcohol-yes/a glass every 2-3 weeks/trini  Advised on alcohol and advised to abstain      Postsurgical malabsorption  -At risk for malabsorption of vitamins/minerals secondary to malabsorption and restriction of intake from their procedure  -Currently taking adequate postop bariatric surgery vitamin supplementation-yes  -Last set of bariatric labs completed on 12/17 and are not within acceptable limits   -Next set of bariatric labs ordered for approximately  1 month  Taking 4 fusion, one calcium  And an Extra 2000 IU vitamin D3    Vitamin D insufficiency  By last labs  He has added an EXTRA 2000 IU vitamin D3 daily-this will be repeated with his routine labs       Diagnoses and all orders for this visit:    Bariatric surgery status    Postsurgical malabsorption    Vitamin D insufficiency          Subjective:      Patient ID: Yolette Simms is a 26 y o  male  He is tolerating a regular diet  He is taking bariatric supplements He is exercising regularly  He gained up to 260 lb since his last visit but lost 10 lb again          The following portions of the patient's history were reviewed and updated as appropriate: allergies, current medications, past family history, past medical history, past social history, past surgical history and problem list     Review of Systems   Constitutional: Negative for chills, fever and unexpected weight change (weight gain and some reloss since last visit)  Respiratory: Negative for shortness of breath and wheezing  Cardiovascular: Negative for chest pain and palpitations  Gastrointestinal: Negative for abdominal pain, constipation, diarrhea, nausea and vomiting  Psychiatric/Behavioral: Suicidal ideas: no complait of anxiety or depression  Objective:      /70   Pulse 70   Temp 98 °F (36 7 °C)   Resp 18   Ht 5' 9 7" (1 77 m)   Wt 113 kg (250 lb)   BMI 36 18 kg/m²          Physical Exam   Constitutional: He is oriented to person, place, and time  He appears well-developed and well-nourished  HENT:   Mouth/Throat: Oropharynx is clear and moist    Eyes: Conjunctivae are normal  No scleral icterus  Cardiovascular: Normal rate and regular rhythm  Pulmonary/Chest: Effort normal and breath sounds normal    Abdominal: Soft  There is no tenderness  No incisional hernias appreciated   Musculoskeletal:   Normal gait   Neurological: He is alert and oriented to person, place, and time  Psychiatric: He has a normal mood and affect  His behavior is normal  Judgment and thought content normal    Nursing note and vitals reviewed  GOALS: Continued weight loss with good nutrition intakes    Normal vitamin and mineral levels  Exercise as tolerated    BARRIERS: none identified

## 2018-08-01 ENCOUNTER — APPOINTMENT (OUTPATIENT)
Dept: LAB | Facility: CLINIC | Age: 33
End: 2018-08-01
Payer: COMMERCIAL

## 2018-08-01 DIAGNOSIS — E55.9 VITAMIN D INSUFFICIENCY: ICD-10-CM

## 2018-08-01 DIAGNOSIS — Z98.84 BARIATRIC SURGERY STATUS: ICD-10-CM

## 2018-08-01 DIAGNOSIS — K91.2 POSTSURGICAL MALABSORPTION: ICD-10-CM

## 2018-08-01 LAB
25(OH)D3 SERPL-MCNC: 28.8 NG/ML (ref 30–100)
ALBUMIN SERPL BCP-MCNC: 3.3 G/DL (ref 3.5–5)
ALP SERPL-CCNC: 89 U/L (ref 46–116)
ALT SERPL W P-5'-P-CCNC: 15 U/L (ref 12–78)
ANION GAP SERPL CALCULATED.3IONS-SCNC: 6 MMOL/L (ref 4–13)
AST SERPL W P-5'-P-CCNC: 12 U/L (ref 5–45)
BILIRUB SERPL-MCNC: 0.94 MG/DL (ref 0.2–1)
BUN SERPL-MCNC: 10 MG/DL (ref 5–25)
CALCIUM SERPL-MCNC: 8.8 MG/DL (ref 8.3–10.1)
CHLORIDE SERPL-SCNC: 103 MMOL/L (ref 100–108)
CO2 SERPL-SCNC: 29 MMOL/L (ref 21–32)
CREAT SERPL-MCNC: 0.89 MG/DL (ref 0.6–1.3)
ERYTHROCYTE [DISTWIDTH] IN BLOOD BY AUTOMATED COUNT: 12.4 % (ref 11.6–15.1)
FERRITIN SERPL-MCNC: 231 NG/ML (ref 8–388)
FOLATE SERPL-MCNC: >20 NG/ML (ref 3.1–17.5)
GFR SERPL CREATININE-BSD FRML MDRD: 111 ML/MIN/1.73SQ M
GLUCOSE P FAST SERPL-MCNC: 89 MG/DL (ref 65–99)
HCT VFR BLD AUTO: 48.1 % (ref 36.5–49.3)
HGB BLD-MCNC: 15.4 G/DL (ref 12–17)
MCH RBC QN AUTO: 28.4 PG (ref 26.8–34.3)
MCHC RBC AUTO-ENTMCNC: 32 G/DL (ref 31.4–37.4)
MCV RBC AUTO: 89 FL (ref 82–98)
PLATELET # BLD AUTO: 197 THOUSANDS/UL (ref 149–390)
PMV BLD AUTO: 10.3 FL (ref 8.9–12.7)
POTASSIUM SERPL-SCNC: 3.8 MMOL/L (ref 3.5–5.3)
PROT SERPL-MCNC: 6.9 G/DL (ref 6.4–8.2)
PTH-INTACT SERPL-MCNC: 47.3 PG/ML (ref 18.4–80.1)
RBC # BLD AUTO: 5.42 MILLION/UL (ref 3.88–5.62)
SODIUM SERPL-SCNC: 138 MMOL/L (ref 136–145)
VIT B12 SERPL-MCNC: 620 PG/ML (ref 100–900)
WBC # BLD AUTO: 5.44 THOUSAND/UL (ref 4.31–10.16)

## 2018-08-01 PROCEDURE — 83970 ASSAY OF PARATHORMONE: CPT

## 2018-08-01 PROCEDURE — 84425 ASSAY OF VITAMIN B-1: CPT

## 2018-08-01 PROCEDURE — 82728 ASSAY OF FERRITIN: CPT

## 2018-08-01 PROCEDURE — 82746 ASSAY OF FOLIC ACID SERUM: CPT

## 2018-08-01 PROCEDURE — 84630 ASSAY OF ZINC: CPT

## 2018-08-01 PROCEDURE — 82607 VITAMIN B-12: CPT

## 2018-08-01 PROCEDURE — 84590 ASSAY OF VITAMIN A: CPT

## 2018-08-01 PROCEDURE — 80053 COMPREHEN METABOLIC PANEL: CPT

## 2018-08-01 PROCEDURE — 82306 VITAMIN D 25 HYDROXY: CPT

## 2018-08-01 PROCEDURE — 85027 COMPLETE CBC AUTOMATED: CPT

## 2018-08-01 PROCEDURE — 82525 ASSAY OF COPPER: CPT

## 2018-08-01 PROCEDURE — 36415 COLL VENOUS BLD VENIPUNCTURE: CPT

## 2018-08-04 LAB
COPPER SERPL-MCNC: 109 UG/DL (ref 72–166)
ZINC SERPL-MCNC: 76 UG/DL (ref 56–134)

## 2018-08-05 LAB
VIT A SERPL-MCNC: 32.9 UG/DL (ref 31.2–89.1)
VIT B1 BLD-SCNC: 119.3 NMOL/L (ref 66.5–200)

## 2018-08-13 DIAGNOSIS — Z98.84 S/P BARIATRIC SURGERY: ICD-10-CM

## 2018-08-13 DIAGNOSIS — K91.2 POSTGASTRECTOMY MALABSORPTION: ICD-10-CM

## 2018-08-13 DIAGNOSIS — E55.9 VITAMIN D DEFICIENCY: Primary | ICD-10-CM

## 2018-08-13 DIAGNOSIS — Z90.3 POSTGASTRECTOMY MALABSORPTION: ICD-10-CM

## 2018-11-12 ENCOUNTER — EVALUATION (OUTPATIENT)
Dept: PHYSICAL THERAPY | Facility: CLINIC | Age: 33
End: 2018-11-12
Payer: COMMERCIAL

## 2018-11-12 DIAGNOSIS — M25.571 RIGHT ANKLE PAIN, UNSPECIFIED CHRONICITY: Primary | ICD-10-CM

## 2018-11-12 PROCEDURE — 97162 PT EVAL MOD COMPLEX 30 MIN: CPT | Performed by: PHYSICAL THERAPIST

## 2018-11-12 PROCEDURE — G8979 MOBILITY GOAL STATUS: HCPCS | Performed by: PHYSICAL THERAPIST

## 2018-11-12 PROCEDURE — G8978 MOBILITY CURRENT STATUS: HCPCS | Performed by: PHYSICAL THERAPIST

## 2018-11-12 NOTE — PROGRESS NOTES
PT Evaluation     Today's date: 2018  Patient name: Jacqui Mcintosh  : 1985  MRN: [id number]  Referring provider: Alfonso Matos DPM  Dx:   Encounter Diagnosis     ICD-10-CM    1  Right ankle pain, unspecified chronicity M25 571        Start Time: 1800  Stop Time: 1845  Total time in clinic (min): 45 minutes    Assessment  Impairments: abnormal or restricted ROM, activity intolerance, impaired physical strength, lacks appropriate home exercise program, pain with function, poor posture  and poor body mechanics    Assessment details: Jacqui Mcintosh is a 26 y o  male who presents with pain, decreased strength and decreased ROM  Due to these impairments, Patient has difficulty performing a/iadls  Patient's clinical presentation is consistent with their referring diagnosis of ankle pain  Patient would benefit from skilled physical therapy to address their aforementioned impairments, improve their level of function and to improve their overall quality of life  Understanding of Dx/Px/POC: excellent  Goals  ST-3 WEEKS  1  Decrease pain by 2 points on VAS at its worst   2   Increase ROM by > 5 deg in all deficients planes  3   Increase LE by 1/2 MMT grade in all deficient planes  LT-6 WEEKS  1  Patient to be independent with a/iadls  2  Increase functional activities for leisure and home activities to previous LOF    3  Independent with HEP and/or fitness program     Plan  Patient would benefit from: skilled physical therapy  Planned modality interventions: cryotherapy, electrical stimulation/Russian stimulation, thermotherapy: hydrocollator packs and unattended electrical stimulation  Planned therapy interventions: activity modification, behavior modification, body mechanics training, aquatic therapy, flexibility, functional ROM exercises, home exercise program, IADL retraining, joint mobilization, manual therapy, neuromuscular re-education, patient education, postural training, strengthening, stretching, therapeutic activities and therapeutic exercise  Frequency: 2x week (2-3x week)  Duration in weeks: 12  Treatment plan discussed with: patient        Subjective Evaluation    History of Present Illness  Date of onset: 2/3/2018  Mechanism of injury: Right ankle pain after falling skiing last winter and 2 weeks ago re-twisted on a ladder, complains of right ankle stiffness and soreness  Quality of life: good    Pain  Current pain ratin  At best pain ratin  At worst pain ratin  Quality: discomfort  Relieving factors: ice and support  Aggravating factors: walking and running  Progression: no change    Social Support  Steps to enter house: yes  Stairs in house: no   Lives in: apartment  Lives with: alone      Diagnostic Tests  MRI studies: abnormal  Patient Goals  Patient goals for therapy: return to sport/leisure activities and independence with ADLs/IADLs          Objective     Static Posture     Ankle/Foot   Ankle/Foot (Right): Pes cavus  Palpation     Right Tenderness of the peroneus  Tenderness     Right Ankle/Foot   Tenderness in the anterior talofibular ligament and calcaneofibular ligament  Active Range of Motion   Left Ankle/Foot   Dorsiflexion (ke): 9 degrees   Plantar flexion: 50 degrees   Inversion: 30 degrees   Eversion: 15 degrees     Right Ankle/Foot   Dorsiflexion (ke): 5 degrees   Plantar flexion: 45 degrees   Inversion: 25 degrees   Eversion: 10 degrees     Strength/Myotome Testing     Left Ankle/Foot   Normal strength    Right Ankle/Foot   Dorsiflexion: 4  Plantar flexion: 4  Inversion: 4-  Eversion: 4-    Swelling   Left Ankle/Foot   Figure 8: 53 cm    Right Ankle/Foot   Figure 8: 54 cm    Ambulation     Observational Gait   Gait: antalgic   Decreased walking speed and right stance time       Functional Assessment     Single Leg Stance   Left: 12 seconds  Right: 8 seconds          Precautions: Standard, bariatric sx    Daily Treatment Diary     Manual 11/12                         MT ankle nv                                                       Exercise Diary  11/12                         BIKE nv            SLANT nv            DISC(sit) nv                         Ankle MRE nv                                                                                                                                                                                                      Modalities  11/12                         FWP nv

## 2018-11-12 NOTE — LETTER
2018    Mariah Melo DPM  [address] [facility], [facility] [facility], [place] [address]    Patient: Reginaldo Mejia   YOB: 1985   Date of Visit: 2018     Encounter Diagnosis     ICD-10-CM    1  Right ankle pain, unspecified chronicity M25 571        Dear Dr Mann Begun:    Please review the attached Plan of Care from [facility] recent visit  Please verify that you agree therapy should continue by signing the attached document and sending it back to our office  If you have any questions or concerns, please don't hesitate to call  Sincerely,    Vanessa Spain, PT      Referring Provider:      I certify that I have read the below Plan of Care and certify the need for these services furnished under this plan of treatment while under my care  FIDELIA Amin Duyen: [phone]          PT Evaluation     Today's date: 2018  Patient name: Reginaldo Mejia  : 1985  MRN: [id number]  Referring provider: Angel Miles DPM  Dx:   Encounter Diagnosis     ICD-10-CM    1   Right ankle pain, unspecified chronicity M25 571        Start Time: 1800  Stop Time: 1845  Total time in clinic (min): 45 minutes    Assessment/Plan    Subjective Evaluation    History of Present Illness  Date of onset: 2/3/2018  Mechanism of injury: Right ankle pain after falling skiing last winter and 2 weeks ago re-twisted on a ladder, complains of right ankle stiffness and soreness  Quality of life: good    Pain  Current pain ratin  At best pain ratin  At worst pain ratin  Quality: discomfort  Relieving factors: ice and support  Aggravating factors: walking and running  Progression: no change    Social Support  Steps to enter house: yes  Stairs in house: no   Lives in: apartment  Lives with: alone      Diagnostic Tests  MRI studies: abnormal  Patient Goals  Patient goals for therapy: return to sport/leisure activities and independence with ADLs/IADLs          Objective     Static Posture     Ankle/Foot   Ankle/Foot (Right): Pes cavus  Palpation     Right Tenderness of the peroneus  Tenderness     Right Ankle/Foot   Tenderness in the anterior talofibular ligament and calcaneofibular ligament  Active Range of Motion   Left Ankle/Foot   Dorsiflexion (ke): 9 degrees   Plantar flexion: 50 degrees   Inversion: 30 degrees   Eversion: 15 degrees     Right Ankle/Foot   Dorsiflexion (ke): 5 degrees   Plantar flexion: 45 degrees   Inversion: 25 degrees   Eversion: 10 degrees     Strength/Myotome Testing     Left Ankle/Foot   Normal strength    Right Ankle/Foot   Dorsiflexion: 4  Plantar flexion: 4  Inversion: 4-  Eversion: 4-    Swelling   Left Ankle/Foot   Figure 8: 53 cm    Right Ankle/Foot   Figure 8: 54 cm    Ambulation     Observational Gait   Gait: antalgic   Decreased walking speed and right stance time       Functional Assessment     Single Leg Stance   Left: 12 seconds  Right: 8 seconds          Precautions: Standard, bariatric sx    Daily Treatment Diary     Manual  11/12                         MT ankle nv                                                       Exercise Diary  11/12                         BIKE nv            SLANT nv            DISC(sit) nv                         Ankle MRE nv                                                                                                                                                                                                      Modalities  11/12                         FWP nv

## 2018-11-13 ENCOUNTER — TRANSCRIBE ORDERS (OUTPATIENT)
Dept: PHYSICAL THERAPY | Facility: CLINIC | Age: 33
End: 2018-11-13

## 2018-11-13 DIAGNOSIS — M25.571 RIGHT ANKLE PAIN, UNSPECIFIED CHRONICITY: Primary | ICD-10-CM

## 2018-11-14 ENCOUNTER — APPOINTMENT (OUTPATIENT)
Dept: PHYSICAL THERAPY | Facility: CLINIC | Age: 33
End: 2018-11-14
Payer: COMMERCIAL

## 2018-11-14 ENCOUNTER — OFFICE VISIT (OUTPATIENT)
Dept: PHYSICAL THERAPY | Facility: CLINIC | Age: 33
End: 2018-11-14
Payer: COMMERCIAL

## 2018-11-14 DIAGNOSIS — M25.571 RIGHT ANKLE PAIN, UNSPECIFIED CHRONICITY: Primary | ICD-10-CM

## 2018-11-14 PROCEDURE — 97110 THERAPEUTIC EXERCISES: CPT

## 2018-11-14 PROCEDURE — 97022 WHIRLPOOL THERAPY: CPT

## 2018-11-14 PROCEDURE — 97140 MANUAL THERAPY 1/> REGIONS: CPT

## 2018-11-14 NOTE — PROGRESS NOTES
Daily Note     Today's date: 2018  Patient name: Yohana Patrick  : 1985  MRN: [id number]  Referring provider: Axel Bruno DPM  Dx:   Encounter Diagnosis     ICD-10-CM    1  Right ankle pain, unspecified chronicity M25 571        Start Time:   Stop Time: 1900  Total time in clinic (min): 50 minutes    Subjective: pt notes minimal pain in R ankle 1/10      Objective: See treatment diary below      Assessment: Tolerated treatment well  Pt has tightness R ankle  HEP issued of stretches and ankle ex's w/ theraband  Pt did well w/ initial ex's to R ankle  Weakness throughout R ankle noted   Pt's skin was assessed before and after modality treatment with good skin integrity noted  Pt was informed to let myself or another staff member know if the pt would become uncomfortable at any time  Pt was informed of precautions for modality given  Appropriate barriers were applied for modality treatment  Patient demonstrated fatigue post treatment and would benefit from continued PT      Plan: Continue per plan of care       Precautions: Standard, bariatric sx    Daily Treatment Diary     Manual                          MT ankle nv 10           HEP  5'                                         Exercise Diary                          BIKE nv 5'           SLANT nv 30''x4           DISC(sit) nv            prostretch  10''x5           Ankle MRE nv x10 ea           SLS  30''x3                                                                                                                                                                                        Modalities                          FWP nv 10

## 2018-11-19 ENCOUNTER — OFFICE VISIT (OUTPATIENT)
Dept: PHYSICAL THERAPY | Facility: CLINIC | Age: 33
End: 2018-11-19
Payer: COMMERCIAL

## 2018-11-19 DIAGNOSIS — M25.571 RIGHT ANKLE PAIN, UNSPECIFIED CHRONICITY: Primary | ICD-10-CM

## 2018-11-19 PROCEDURE — 97140 MANUAL THERAPY 1/> REGIONS: CPT | Performed by: PHYSICAL THERAPIST

## 2018-11-19 PROCEDURE — 97110 THERAPEUTIC EXERCISES: CPT | Performed by: PHYSICAL THERAPIST

## 2018-11-19 PROCEDURE — 97022 WHIRLPOOL THERAPY: CPT | Performed by: PHYSICAL THERAPIST

## 2018-11-19 NOTE — PROGRESS NOTES
Daily Note     Today's date: 2018  Patient name: Lissette Coon  : 1985  MRN: [id number]  Referring provider: Jonelle Pina DPM  Dx:   Encounter Diagnosis     ICD-10-CM    1  Right ankle pain, unspecified chronicity M25 571        Start Time:   Stop Time:   Total time in clinic (min): 45 minutes    Subjective: Patient reports he overturned his left ankle walking up a bank on Thursday in the snow  Increased pain since  Patient reports he wore his boot all weekend  Objective: See treatment diary below  Precautions: Standard, bariatric sx    Daily Treatment Diary     Modalities                         FWP nv 10 10'                           Manual                         MT ankle nv 10 10'          HEP  5'                                         Exercise Diary                         BIKE nv 5' 5'          SLANT nv 30''x4 30" 4x          DISC(sit) nv  15x          prostretch  10''x5 10" 5x          Ankle MRE nv x10 ea nt          SLS  30''x3 30" 3x                                                                                                                                                                                                   Assessment: Tolerated treatment well  Patient would benefit from continued PT min swelling lateral foot and ankle noted  Patient guarded with movement, antalgic gait  Advised patient to ice his ankle  Plan: Continue per plan of care

## 2018-11-21 ENCOUNTER — OFFICE VISIT (OUTPATIENT)
Dept: PHYSICAL THERAPY | Facility: CLINIC | Age: 33
End: 2018-11-21
Payer: COMMERCIAL

## 2018-11-21 DIAGNOSIS — M25.571 RIGHT ANKLE PAIN, UNSPECIFIED CHRONICITY: Primary | ICD-10-CM

## 2018-11-21 PROCEDURE — 97140 MANUAL THERAPY 1/> REGIONS: CPT

## 2018-11-21 PROCEDURE — G8979 MOBILITY GOAL STATUS: HCPCS

## 2018-11-21 PROCEDURE — 97110 THERAPEUTIC EXERCISES: CPT

## 2018-11-21 PROCEDURE — G8978 MOBILITY CURRENT STATUS: HCPCS

## 2018-11-21 PROCEDURE — 97022 WHIRLPOOL THERAPY: CPT

## 2018-11-22 NOTE — PROGRESS NOTES
Daily Note     Today's date: 2018  Patient name: Sheila Mejia  : 1985  MRN: [id number]  Referring provider: Fco Doherty DPM  Dx:   Encounter Diagnosis     ICD-10-CM    1  Right ankle pain, unspecified chronicity M25 571                   Subjective: pt notes ankle is feeling a little better today  Pt notes minimal pain 1/10  Objective: See treatment diary below      Assessment: Tolerated treatment fair  Pt continues with sig swelling to lateral ankle/malleolus  No added ex's today due to swelling  Added ice massage today  Patient would benefit from continued PT      Plan: Continue per plan of care         Precautions: Standard, bariatric sx    Daily Treatment Diary     Modalities                        FWP nv 10 10'   10'                        Manual                        MT ankle nv 10 10' 10'         HEP  5'           Ice massage    5'                          Exercise Diary                        BIKE nv 5' 5' 7         SLANT nv 30''x4 30" 4x 30''x4         DISC(sit) nv  15x x15         prostretch  10''x5 10" 5x 10''x5         Ankle MRE nv x10 ea nt x10 ea         SLS  30''x3 30" 3x 30//x3

## 2018-11-26 ENCOUNTER — OFFICE VISIT (OUTPATIENT)
Dept: PHYSICAL THERAPY | Facility: CLINIC | Age: 33
End: 2018-11-26
Payer: COMMERCIAL

## 2018-11-26 DIAGNOSIS — M25.571 RIGHT ANKLE PAIN, UNSPECIFIED CHRONICITY: Primary | ICD-10-CM

## 2018-11-26 PROCEDURE — 97110 THERAPEUTIC EXERCISES: CPT | Performed by: PHYSICAL THERAPIST

## 2018-11-26 PROCEDURE — 97140 MANUAL THERAPY 1/> REGIONS: CPT | Performed by: PHYSICAL THERAPIST

## 2018-11-26 PROCEDURE — 97022 WHIRLPOOL THERAPY: CPT | Performed by: PHYSICAL THERAPIST

## 2018-11-26 NOTE — PROGRESS NOTES
Daily Note     Today's date: 2018  Patient name: Brunilda Lucero  : 1985  MRN: [id number]  Referring provider: Cirilo Cardona DPM  Dx:   Encounter Diagnosis     ICD-10-CM    1  Right ankle pain, unspecified chronicity M25 571        Start Time:   Stop Time: 1900  Total time in clinic (min): 45 minutes    Subjective: Patient reports he is feeling much better  Objective: See treatment diary below  Precautions: Standard, bariatric sx    Daily Treatment Diary     Modalities                       FWP nv 10 10'   10' 10'                       Manual                       MT ankle nv 10 10' 10' 10'        HEP  5'           Ice massage    5'                          Exercise Diary                       BIKE nv 5' 5' 7 6'        SLANT nv 30''x4 30" 4x 30''x4 30" 4x        DISC(sit) nv  15x x15 20x        prostretch  10''x5 10" 5x 10''x5 10" 10x        Ankle MRE nv x10 ea nt x10 ea nt        SLS  30''x3 30" 3x 30//x3 30" 3x        Leg Press     100# 30x        Calf press     50# 30x                                                                                                                                                             Assessment: Tolerated treatment well  Patient would benefit from continued PT  Small pocket of fluid lateral malleolus  Plan: Continue per plan of care

## 2018-11-28 ENCOUNTER — OFFICE VISIT (OUTPATIENT)
Dept: PHYSICAL THERAPY | Facility: CLINIC | Age: 33
End: 2018-11-28
Payer: COMMERCIAL

## 2018-11-28 DIAGNOSIS — M25.571 RIGHT ANKLE PAIN, UNSPECIFIED CHRONICITY: Primary | ICD-10-CM

## 2018-11-28 PROCEDURE — 97022 WHIRLPOOL THERAPY: CPT | Performed by: PHYSICAL THERAPIST

## 2018-11-28 PROCEDURE — 97110 THERAPEUTIC EXERCISES: CPT | Performed by: PHYSICAL THERAPIST

## 2018-11-28 PROCEDURE — 97140 MANUAL THERAPY 1/> REGIONS: CPT | Performed by: PHYSICAL THERAPIST

## 2018-11-28 NOTE — PROGRESS NOTES
Daily Note     Today's date: 2018  Patient name: Neetu Brumfield  : 1985  MRN: [id number]  Referring provider: Asim Mccormick DPM  Dx:   Encounter Diagnosis     ICD-10-CM    1  Right ankle pain, unspecified chronicity M25 571                 SUBJECTIVE: ankle is improving but still sore  Objective: See treatment diary below  Precautions: Standard, bariatric sx    Daily Treatment Diary     Modalities                      FWP nv 10 10'   10' 10' 10'                      Manual                      MT ankle nv 10 10' 10' 10' 10       HEP  5'           Ice massage    5'                          Exercise Diary                      BIKE nv 5' 5' 7 6' 6'       SLANT nv 30''x4 30" 4x 30''x4 30" 4x 4x       DISC(sit) nv  15x x15 20x 20x       prostretch  10''x5 10" 5x 10''x5 10" 10x 10x       Ankle MRE nv x10 ea nt x10 ea nt 10 ea       SLS  30''x3 30" 3x 30//x3 30" 3x 3x       Leg Press     100# 30x 100/30       Calf press     50# 30x 50/30                                                                                                                                                            Assessment: Tolerated treatment well  Patient exhibited good technique with therapeutic exercises and would benefit from continued PT, still with decreased inversion and eversion ROM but slowly progressing with strength  Plan: Progress treatment as tolerated

## 2018-12-03 ENCOUNTER — OFFICE VISIT (OUTPATIENT)
Dept: PHYSICAL THERAPY | Facility: CLINIC | Age: 33
End: 2018-12-03
Payer: COMMERCIAL

## 2018-12-03 DIAGNOSIS — M25.571 RIGHT ANKLE PAIN, UNSPECIFIED CHRONICITY: Primary | ICD-10-CM

## 2018-12-03 PROCEDURE — 97140 MANUAL THERAPY 1/> REGIONS: CPT | Performed by: PHYSICAL THERAPIST

## 2018-12-03 PROCEDURE — 97022 WHIRLPOOL THERAPY: CPT | Performed by: PHYSICAL THERAPIST

## 2018-12-03 PROCEDURE — 97110 THERAPEUTIC EXERCISES: CPT | Performed by: PHYSICAL THERAPIST

## 2018-12-03 NOTE — PROGRESS NOTES
Daily Note     Today's date: 12/3/2018  Patient name: Pilo Tijerina  : 1985  MRN: [id number]  Referring provider: Benigno Liang DPM  Dx:   Encounter Diagnosis     ICD-10-CM    1  Right ankle pain, unspecified chronicity M25 571                   Subjective: Patient reports no pain today      Objective: See treatment diary below    Precautions: Standard, bariatric sx    Daily Treatment Diary     Modalities  11/12 11/14 11/19 11/21 11/26 11/28 12/3                   FWP nv 10 10'   10' 10' 10' 10                     Manual  11/12 11/14 11/19 11/21 11/26 11/28 12/3                   MT ankle nv 10 10' 10' 10' 10 10      HEP  5'           Ice massage    5'                          Exercise Diary  11/12 11/14 11/19 11/21 11/26 11/28 12/3                   BIKE nv 5' 5' 7 6' 6' 6      SLANT nv 30''x4 30" 4x 30''x4 30" 4x 4x 4x      DISC(sit) nv  15x x15 20x 20x 20x      prostretch  10''x5 10" 5x 10''x5 10" 10x 10x 10x      Ankle MRE nv x10 ea nt x10 ea nt 10 ea 10 ea      SLS  30''x3 30" 3x 30//x3 30" 3x 3x 3x      Leg Press     100# 30x 100/30 100/30      Calf press     50# 30x 50/30 50/30                                                                                                                                                       Assessment: Tolerated treatment well  Patient exhibited good technique with therapeutic exercises and would benefit from continued PT, increased ROM noted  Plan: Potential discharge next visit

## 2018-12-05 ENCOUNTER — OFFICE VISIT (OUTPATIENT)
Dept: PHYSICAL THERAPY | Facility: CLINIC | Age: 33
End: 2018-12-05
Payer: COMMERCIAL

## 2018-12-05 DIAGNOSIS — M25.571 RIGHT ANKLE PAIN, UNSPECIFIED CHRONICITY: Primary | ICD-10-CM

## 2018-12-05 PROCEDURE — 97140 MANUAL THERAPY 1/> REGIONS: CPT

## 2018-12-05 PROCEDURE — G8980 MOBILITY D/C STATUS: HCPCS | Performed by: PHYSICAL THERAPIST

## 2018-12-05 PROCEDURE — G8979 MOBILITY GOAL STATUS: HCPCS | Performed by: PHYSICAL THERAPIST

## 2018-12-05 PROCEDURE — 97110 THERAPEUTIC EXERCISES: CPT

## 2018-12-05 PROCEDURE — 97022 WHIRLPOOL THERAPY: CPT

## 2018-12-05 NOTE — PROGRESS NOTES
Daily Note     Today's date: 2018  Patient name: Reginaldo Mejia  : 1985  MRN: [id number]  Referring provider: Angel Miles DPM  Dx:   Encounter Diagnosis     ICD-10-CM    1  Right ankle pain, unspecified chronicity M25 571        Start Time: 1800  Stop Time: 1900  Total time in clinic (min): 60 minutes    Subjective: Patient reports doing good no c/o pain  Objective: See treatment diary below      Assessment: Tolerated treatment well  Patient demonstrated fatigue post treatment and would benefit from continued PT Right Ankle/Foot   Dorsiflexion (ke): 5 degrees   Plantar flexion: 45 degrees   Inversion: 25 degrees   Eversion: 10 degrees     Strength/Myotome Testing     Left Ankle/Foot   Normal strength    Right Ankle/Foot   Dorsiflexion: 4  Plantar flexion: 4  Inversion: 4  Eversion: 4-            Plan: Continue per plan of care           Precautions: Standard, bariatric sx    Daily Treatment Diary     Modalities  11/12 11/14 11/19 11/21 11/26 11/28 12/3 12/5                  FWP nv 10 10'   10' 10' 10' 10 10                    Manual  11/12 11/14 11/19 11/21 11/26 11/28 12/3 12                  MT ankle nv 10 10' 10' 10' 10 10 10'     HEP  5'           Ice massage    5'                          Exercise Diary  11/12 11/14 11/19 11/21 11/26 11/28 12/3 12/5                  BIKE nv 5' 5' 7 6' 6' 6 6     SLANT nv 30''x4 30" 4x 30''x4 30" 4x 4x 4x 4x     DISC(sit) nv  15x x15 20x 20x 20x 30x     prostretch  10''x5 10" 5x 10''x5 10" 10x 10x 10x 10x     Ankle MRE nv x10 ea nt x10 ea nt 10 ea 10 ea 15 ea     SLS  30''x3 30" 3x 30//x3 30" 3x 3x 3x 3x     Leg Press     100# 30x 100/30 100/30 230  30x     Calf press     50# 30x 50/30 50/30 90/30

## 2018-12-06 NOTE — PROGRESS NOTES
Patient to D/C to HEP, partial goals met  At this time, patient has achieved their maximum functional benefit from skilled physical therapy services and will be discharged to their HEP  Patient is in agreement with the plan of care  As a result, patient is discharged from physical therapy

## 2019-03-11 ENCOUNTER — HOSPITAL ENCOUNTER (EMERGENCY)
Facility: HOSPITAL | Age: 34
Discharge: HOME | End: 2019-03-11
Attending: EMERGENCY MEDICINE
Payer: COMMERCIAL

## 2019-03-11 ENCOUNTER — APPOINTMENT (EMERGENCY)
Dept: RADIOLOGY | Facility: HOSPITAL | Age: 34
End: 2019-03-11
Payer: COMMERCIAL

## 2019-03-11 ENCOUNTER — OFFICE VISIT (OUTPATIENT)
Dept: OBGYN CLINIC | Facility: HOSPITAL | Age: 34
End: 2019-03-11
Payer: COMMERCIAL

## 2019-03-11 VITALS
SYSTOLIC BLOOD PRESSURE: 146 MMHG | DIASTOLIC BLOOD PRESSURE: 90 MMHG | WEIGHT: 265 LBS | HEART RATE: 81 BPM | OXYGEN SATURATION: 98 % | BODY MASS INDEX: 38.35 KG/M2 | TEMPERATURE: 97.8 F | RESPIRATION RATE: 18 BRPM

## 2019-03-11 VITALS
HEIGHT: 70 IN | WEIGHT: 275 LBS | SYSTOLIC BLOOD PRESSURE: 153 MMHG | BODY MASS INDEX: 39.37 KG/M2 | HEART RATE: 98 BPM | DIASTOLIC BLOOD PRESSURE: 89 MMHG

## 2019-03-11 DIAGNOSIS — M25.571 PAIN IN LATERAL PORTION OF RIGHT ANKLE: Primary | ICD-10-CM

## 2019-03-11 DIAGNOSIS — S93.401A SPRAIN OF RIGHT ANKLE, UNSPECIFIED LIGAMENT, INITIAL ENCOUNTER: Primary | ICD-10-CM

## 2019-03-11 DIAGNOSIS — Q66.71 PES CAVUS OF RIGHT FOOT: ICD-10-CM

## 2019-03-11 PROCEDURE — 73610 X-RAY EXAM OF ANKLE: CPT

## 2019-03-11 PROCEDURE — 99283 EMERGENCY DEPT VISIT LOW MDM: CPT

## 2019-03-11 PROCEDURE — 99203 OFFICE O/P NEW LOW 30 MIN: CPT | Performed by: PHYSICIAN ASSISTANT

## 2019-03-11 RX ORDER — TRAMADOL HYDROCHLORIDE 50 MG/1
50 TABLET ORAL EVERY 6 HOURS PRN
Qty: 15 TABLET | Refills: 0 | Status: SHIPPED | OUTPATIENT
Start: 2019-03-11 | End: 2019-03-21

## 2019-03-11 NOTE — ED PROVIDER NOTES
History  Chief Complaint   Patient presents with    Ankle Injury     pt c/o right ankle pain "while walking" states she "heard a pop " Pt had previously injured it in the past, denies surgeries        History provided by:  Patient  Ankle Pain   Location:  Ankle  Time since incident:  1 hour  Injury: yes    Mechanism of injury comment:  Walking, inversion injury  Ankle location:  R ankle  Pain details:     Quality:  Aching    Radiates to:  Does not radiate    Severity:  Moderate    Onset quality:  Sudden    Duration:  1 hour    Timing:  Constant    Progression:  Unchanged  Chronicity:  New  Relieved by:  Rest  Worsened by:  Bearing weight  Ineffective treatments:  None tried  Associated symptoms: no decreased ROM and no fever        Prior to Admission Medications   Prescriptions Last Dose Informant Patient Reported? Taking? Calcium Carb-Cholecalciferol (CALCIUM 1000 + D) 1000-800 MG-UNIT TABS   Yes No   Sig: Take by mouth   SUMAtriptan (IMITREX) 20 MG/ACT nasal spray   Yes No   Sig: sumatriptan 20 mg/actuation nasal spray   enoxaparin (LOVENOX) 40 mg/0 4 mL   No No   Sig: Inject 0 4 mL under the skin every 24 hours for 13 days   multivitamin (THERAGRAN) TABS   Yes No   Sig: Take 1 tablet by mouth daily      Facility-Administered Medications: None       Past Medical History:   Diagnosis Date    Allergy to environmental factors     As a child patient developed a rash from bleach    Asthma     States weight induced and no medications    Full dentures     Edentulous- only wears top denture    Migraine     Morbid obesity (HCC)     Rash     Has rash that now has scabs from itching  States it was biopsied and was told it was an allergic reaction possibly to detergent    Wears glasses        Past Surgical History:   Procedure Laterality Date    DENTAL SURGERY      ESOPHAGOGASTRODUODENOSCOPY N/A 6/20/2017    Procedure: ESOPHAGOGASTRODUODENOSCOPY (EGD);   Surgeon: German George MD;  [facility]; Service: Bariatrics    [place] EGD TRANSORAL BIOPSY SINGLE/MULTIPLE N/A 5/3/2017    Procedure: ESOPHAGOGASTRODUODENOSCOPY (EGD) with bx;  Surgeon: Anna Potter MD;  [facility]; Service: Bariatrics    [place] LAP, ROSALES RESTRICT PROC, LONGITUDINAL GASTRECTOMY N/A 2017    Procedure: GASTRECTOMY SLEEVE LAPAROSCOPIC;  Surgeon: Anna Potter MD;  [facility];  Service: Bariatrics    TONSILLECTOMY         No family history on file  I have reviewed and agree with the history as documented  Social History     Tobacco Use    Smoking status: Former Smoker     Years: 10 00     Types: Cigarettes, Cigars     Last attempt to quit: 2016     Years since quittin 2    Smokeless tobacco: Never Used    Tobacco comment: Quit cigarettes and cigars- quit over a year ago  Denies heavy smoking   Substance Use Topics    Alcohol use: Yes     Comment: 2 or 3 monthly    Drug use: Yes     Types: Marijuana     Comment: socially        Review of Systems   Constitutional: Negative for fever  Respiratory: Negative for chest tightness and shortness of breath  Cardiovascular: Negative for chest pain  Skin: Negative for rash  Neurological: Negative for dizziness, light-headedness and headaches  Physical Exam  Physical Exam   Constitutional: He appears well-developed  HENT:   Head: Atraumatic  Eyes: Conjunctivae are normal  Right eye exhibits no discharge  Left eye exhibits no discharge  No scleral icterus  Neck: Neck supple  No tracheal deviation present  Pulmonary/Chest: Effort normal  No stridor  No respiratory distress  Musculoskeletal: He exhibits tenderness ( Right lateral malleoli)  He exhibits no deformity  Neurological: He is alert  He exhibits normal muscle tone  Coordination normal    Skin: Skin is warm and dry  No rash noted  No erythema  No pallor  Psychiatric: He has a normal mood and affect  Vitals reviewed        Vital Signs  ED Triage Vitals [19 1159] Temperature Pulse Respirations Blood Pressure SpO2   97 8 °F (36 6 °C) 81 18 146/90 98 %      Temp Source Heart Rate Source Patient Position - Orthostatic VS BP Location FiO2 (%)   Oral Monitor -- -- --      Pain Score       4           Vitals:    03/11/19 1159   BP: 146/90   Pulse: 81       Visual Acuity      ED Medications  Medications - No data to display    Diagnostic Studies  Results Reviewed     None                 XR ankle 3+ views RIGHT   ED Interpretation by Marciano Kidd DO (03/11 1242)   No acute fracture                 Procedures  Procedures       Phone Contacts  ED Phone Contact    ED Course                               MDM  Number of Diagnoses or Management Options  Sprain of right ankle, unspecified ligament, initial encounter: new and requires workup  Diagnosis management comments:       Initial ED assessment:  42-year-old male inversion injury to right ankle, tender over lateral malleoli    Initial DDx includes but is not limited to:   Fracture versus sprain    Initial ED plan:   X-ray        Final ED summary/disposition:   After evaluation and workup in the emergency department, no evidence of fracture, patient able bear weight due to pain will place an air cast, crutches, follow up with Orthopedics        Amount and/or Complexity of Data Reviewed  Tests in the radiology section of CPT®: ordered and reviewed  Review and summarize past medical records: yes  Independent visualization of images, tracings, or specimens: yes        Disposition  Final diagnoses:   Sprain of right ankle, unspecified ligament, initial encounter     Time reflects when diagnosis was documented in both MDM as applicable and the Disposition within this note     Time User Action Codes Description Comment    3/11/2019 12:54 PM Lisa [address] [place] [address] Sprain of right ankle, unspecified ligament, initial encounter       ED Disposition     ED Disposition Condition Date/Time Comment    Discharge Stable Mon Mar 11, 2019 12:54 PM Yana Fernández discharge to home/self care  Follow-up Information     Follow up With Specialties Details Why Contact Info Additional [address] [facility] Call today To arrange for the next available appointment [address] [facility] [place] [phone]  Christiane Burton, [address] [place] [address] [facility] [place], [place], [address]          Discharge Medication List as of 3/11/2019 12:56 PM      START taking these medications    Details   traMADol (ULTRAM) 50 mg tablet Take 1 tablet (50 mg total) by mouth every 6 (six) hours as needed for moderate pain for up to 10 days Label No driving, Starting Mon 3/11/2019, Until Thu 3/21/2019, Print         CONTINUE these medications which have NOT CHANGED    Details   Calcium Carb-Cholecalciferol (CALCIUM 1000 + D) 1000-800 MG-UNIT TABS Take by mouth, Historical Med      enoxaparin (LOVENOX) 40 mg/0 4 mL Inject 0 4 mL under the skin every 24 hours for 13 days, Starting Wed 6/21/2017, Until Tue 7/4/2017, No Print      multivitamin (THERAGRAN) TABS Take 1 tablet by mouth daily, Historical Med      SUMAtriptan (IMITREX) 20 MG/ACT nasal spray sumatriptan 20 mg/actuation nasal spray, Historical Med           No discharge procedures on file      ED Provider  Electronically Signed by           Mike Sanchez DO  03/11/19 5704

## 2019-03-11 NOTE — PROGRESS NOTES
Assessment/Plan   Diagnoses and all orders for this visit:    Pain in lateral portion of right ankle, suspect peroneal tendon tear  -     MRI ankle/heel right  wo contrast; Future  -     High CAM boot fitted and dispensed  -     Monitor for calf swelling or calf tenderness - to go ER right away if either of these develop  -     Use the crutches until able to walk without a limp  -     Follow up in [facility] or [facility]    Pes cavus of right foot          Subjective   Patient ID: Yohana Patrick is a 37 y o  male  Vitals:    03/11/19 1454   BP: 153/89   Pulse: 80     33yo male comes in for an evaluation of his right ankle  This morning, while walking, he heard and felt a loud pop in the lateral ankle  After the pop, then he fell (in that order)  He states everyone around him heard the pop  He went to the ER where xrays showed no fracture  He was given crutches  He continues with lateral ankle pain and swelling  The pain is dull in character, mild in severity, pain does not radiate and is not associated with numbness  The following portions of the patient's history were reviewed and updated as appropriate: allergies, current medications, past family history, past medical history, past social history, past surgical history and problem list     Review of Systems  Ortho Exam  Past Medical History:   Diagnosis Date    Allergy to environmental factors     As a child patient developed a rash from bleach    Asthma     States weight induced and no medications    Full dentures     Edentulous- only wears top denture    Migraine     Morbid obesity ([address] )     Rash     Has rash that now has scabs from itching  States it was biopsied and was told it was an allergic reaction possibly to detergent    Wears glasses      Past Surgical History:   Procedure Laterality Date    DENTAL SURGERY      ESOPHAGOGASTRODUODENOSCOPY N/A 6/20/2017    Procedure: ESOPHAGOGASTRODUODENOSCOPY (EGD);   Surgeon: Matthew Mari MD; [facility];  Service: Bariatrics    [place] EGD TRANSORAL BIOPSY SINGLE/MULTIPLE N/A 5/3/2017    Procedure: ESOPHAGOGASTRODUODENOSCOPY (EGD) with bx;  Surgeon: Erik Courtney MD;  [facility]; Service: Bariatrics    [place] LAP, ROSALES RESTRICT PROC, LONGITUDINAL GASTRECTOMY N/A 2017    Procedure: GASTRECTOMY SLEEVE LAPAROSCOPIC;  Surgeon: Erik Courtney MD;  [facility];  Service: [facility]       History reviewed  No pertinent family history  Social History     Occupational History    Not on file   Tobacco Use    Smoking status: Former Smoker     Years: 10      Types: Cigarettes, Cigars     Last attempt to quit: 2016     Years since quittin 2    Smokeless tobacco: Never Used    Tobacco comment: Quit cigarettes and cigars- quit over a year ago  Denies heavy smoking   Substance and Sexual Activity    Alcohol use: Yes     Comment: 2 or 3 monthly    Drug use: Yes     Types: Marijuana     Comment: socially    Sexual activity: Not on file       Review of Systems   Constitutional: Negative  HENT: Negative  Eyes: Negative  Respiratory: Negative  Cardiovascular: Negative  Gastrointestinal: Negative  Endocrine: Negative  Genitourinary: Negative  Musculoskeletal: As below      Allergic/Immunologic: Negative  Neurological: Negative  Hematological: Negative  Psychiatric/Behavioral: Negative  Objective   Physical Exam        I have personally reviewed pertinent films in PACS and my interpretation is Pes cavus deformity  Achilles spur  No acute fracture         · Constitutional: Awake, Alert, Oriented  · Eyes: EOMI  · Psych: Mood and affect appropriate  · Heart: regular rate and rhythm  · Lungs: No audible wheezing  · Abdomen: soft  · Lymph: no lymphedema             right ankle:  - Appearance   Swelling: moderate, lateral, no discoloration, no ecchymosis, no erythema and + pes cavus deformity, valgus deformity  - Palpation   + Lateral malleolus tenderness, + CF tenderness, + PTF tenderness and +++ peroneal tendon area tenderness  - ROM   Dorsiflexion: 0, Plantarflexion: 20 and ++ pain with resisted eversion  - Special Tests   Negative anterior drawer  - Motor   normal 5/5 in all planes  - NVI distally

## 2019-03-19 ENCOUNTER — HOSPITAL ENCOUNTER (OUTPATIENT)
Dept: MRI IMAGING | Facility: HOSPITAL | Age: 34
Discharge: HOME | End: 2019-03-19
Payer: COMMERCIAL

## 2019-03-19 DIAGNOSIS — M25.571 PAIN IN LATERAL PORTION OF RIGHT ANKLE: ICD-10-CM

## 2019-03-19 PROCEDURE — 73721 MRI JNT OF LWR EXTRE W/O DYE: CPT

## 2019-03-20 ENCOUNTER — TELEPHONE (OUTPATIENT)
Dept: OBGYN CLINIC | Facility: HOSPITAL | Age: 34
End: 2019-03-20

## 2019-03-22 ENCOUNTER — OFFICE VISIT (OUTPATIENT)
Dept: OBGYN CLINIC | Facility: CLINIC | Age: 34
End: 2019-03-22
Payer: COMMERCIAL

## 2019-03-22 ENCOUNTER — APPOINTMENT (OUTPATIENT)
Dept: LAB | Facility: CLINIC | Age: 34
End: 2019-03-22
Payer: COMMERCIAL

## 2019-03-22 VITALS
HEIGHT: 70 IN | DIASTOLIC BLOOD PRESSURE: 82 MMHG | SYSTOLIC BLOOD PRESSURE: 121 MMHG | HEART RATE: 83 BPM | BODY MASS INDEX: 39.46 KG/M2

## 2019-03-22 DIAGNOSIS — S86.311A PERONEAL TENDON TEAR, RIGHT, INITIAL ENCOUNTER: Primary | ICD-10-CM

## 2019-03-22 DIAGNOSIS — S86.311A PERONEAL TENDON TEAR, RIGHT, INITIAL ENCOUNTER: ICD-10-CM

## 2019-03-22 LAB
ANION GAP SERPL CALCULATED.3IONS-SCNC: 7 MMOL/L (ref 4–13)
BASOPHILS # BLD AUTO: 0.03 THOUSANDS/ΜL (ref 0–0.1)
BASOPHILS NFR BLD AUTO: 1 % (ref 0–1)
BUN SERPL-MCNC: 11 MG/DL (ref 5–25)
CALCIUM SERPL-MCNC: 9.3 MG/DL (ref 8.3–10.1)
CHLORIDE SERPL-SCNC: 103 MMOL/L (ref 100–108)
CO2 SERPL-SCNC: 31 MMOL/L (ref 21–32)
CREAT SERPL-MCNC: 0.95 MG/DL (ref 0.6–1.3)
EOSINOPHIL # BLD AUTO: 0.13 THOUSAND/ΜL (ref 0–0.61)
EOSINOPHIL NFR BLD AUTO: 2 % (ref 0–6)
ERYTHROCYTE [DISTWIDTH] IN BLOOD BY AUTOMATED COUNT: 12.5 % (ref 11.6–15.1)
GFR SERPL CREATININE-BSD FRML MDRD: 103 ML/MIN/1.73SQ M
GLUCOSE SERPL-MCNC: 83 MG/DL (ref 65–140)
HCT VFR BLD AUTO: 49.9 % (ref 36.5–49.3)
HGB BLD-MCNC: 16.4 G/DL (ref 12–17)
IMM GRANULOCYTES # BLD AUTO: 0.02 THOUSAND/UL (ref 0–0.2)
IMM GRANULOCYTES NFR BLD AUTO: 0 % (ref 0–2)
LYMPHOCYTES # BLD AUTO: 1.82 THOUSANDS/ΜL (ref 0.6–4.47)
LYMPHOCYTES NFR BLD AUTO: 28 % (ref 14–44)
MCH RBC QN AUTO: 28.8 PG (ref 26.8–34.3)
MCHC RBC AUTO-ENTMCNC: 32.9 G/DL (ref 31.4–37.4)
MCV RBC AUTO: 88 FL (ref 82–98)
MONOCYTES # BLD AUTO: 0.71 THOUSAND/ΜL (ref 0.17–1.22)
MONOCYTES NFR BLD AUTO: 11 % (ref 4–12)
NEUTROPHILS # BLD AUTO: 3.82 THOUSANDS/ΜL (ref 1.85–7.62)
NEUTS SEG NFR BLD AUTO: 58 % (ref 43–75)
NRBC BLD AUTO-RTO: 0 /100 WBCS
PLATELET # BLD AUTO: 235 THOUSANDS/UL (ref 149–390)
PMV BLD AUTO: 9.4 FL (ref 8.9–12.7)
POTASSIUM SERPL-SCNC: 3.8 MMOL/L (ref 3.5–5.3)
RBC # BLD AUTO: 5.7 MILLION/UL (ref 3.88–5.62)
SODIUM SERPL-SCNC: 141 MMOL/L (ref 136–145)
WBC # BLD AUTO: 6.53 THOUSAND/UL (ref 4.31–10.16)

## 2019-03-22 PROCEDURE — 99213 OFFICE O/P EST LOW 20 MIN: CPT | Performed by: ORTHOPAEDIC SURGERY

## 2019-03-22 PROCEDURE — 36415 COLL VENOUS BLD VENIPUNCTURE: CPT

## 2019-03-22 PROCEDURE — 80048 BASIC METABOLIC PNL TOTAL CA: CPT

## 2019-03-22 PROCEDURE — 85025 COMPLETE CBC W/AUTO DIFF WBC: CPT

## 2019-03-22 RX ORDER — CHLORHEXIDINE GLUCONATE 4 G/100ML
SOLUTION TOPICAL DAILY PRN
Status: CANCELLED | OUTPATIENT
Start: 2019-03-22

## 2019-03-22 RX ORDER — CEFAZOLIN SODIUM 1 G/50ML
1000 SOLUTION INTRAVENOUS ONCE
Status: CANCELLED | OUTPATIENT
Start: 2019-03-22 | End: 2019-03-22

## 2019-03-22 NOTE — PROGRESS NOTES
TRU Lacy  Attending, Orthopaedic Surgery  [facility] [address] [facility]        ORTHOPAEDIC FOOT AND ANKLE CLINIC VISIT     Assessment:     Encounter Diagnosis   Name Primary?  Peroneal tendon tear, right, initial encounter Yes              Plan:   · The patient verbalized understanding of exam findings and treatment plan  We engaged in the shared decision-making process and treatment options were discussed at length with the patient  Surgical and conservative management discussed today along with risks and benefits  · He is to have surgery for peroneal tendon exploration possible peroneal tendon repair vs  Tenodesis  He needs to be on Lovenox for dvt prophylaxis post surgery due to previous bariatric surgery  · He is to continue in high tide cam walker boot until surgery date  · May continue with rest, ice , elevation  Return for post operative visit       CONSENT FOR SOFT TISSUE PROCEDURES:   Patient understands that there is no guarantee that the surgery will relieve all of their pain and also understands that there may be a prolonged course of protected weight-bearing status required which will restrict them from driving and other activities as discussed at today's visit  Patient recognizes that there are risks with surgery including bleeding, numbness, nerve irritation, wound complications, infection, continued pain, anesthetic complications, death, failure of procedure and possible need for further surgery  The patient understands that there is no guarantee that this surgery will relieve all of His pain and symptoms  Patient understands that there is no guarantee that they will return to full function after the procedure  Patient has provided informed consent for the procedure         History of Present Illness:   Chief Complaint:   Chief Complaint   Patient presents with    Right Ankle - Pain     Magali White is a 37 y o  male who is being seen for right ankle pain x 2 weeks  He states while at work he was walking down a conde when he felt and heard a pop coming from his left ankle with sharp, shooting pain going up the lateral aspect of his leg  Pain is localized at lateral ankle along the peroneal tendons  with minimal radiating and described as sharp and severe  Patient denies numbness, tingling or radicular pain  Denies history of neuropathy  Patient does not smoke, does not have diabetes and does not take blood thinners  Patient denies family history of anesthesia complications and has not had any complications with anesthesia  Pain/symptom timing:  Worse during the day when active  Pain/symptom context:  Worse with activites and work  Pain/symptom modifying factors:  Rest makes better, activities make worse  Pain/symptom associated signs/symptoms: none    Prior treatment   · NSAIDsYes    · Injections No   · Bracing/Orthotics Yes   · Physical Therapy No     Orthopedic Surgical History:   See below    Past Medical, Surgical and Social History:  Past Medical History:  has a past medical history of Allergy to environmental factors, Asthma, Full dentures, Migraine, Morbid obesity ([address] ), Rash, and Wears glasses  Problem List: does not have any pertinent problems on file  Past Surgical History:  has a past surgical history that includes Dental surgery; Tonsillectomy; pr egd transoral biopsy single/multiple (N/A, 5/3/2017); pr lap, janice restrict proc, longitudinal gastrectomy (N/A, 6/20/2017); and Esophagogastroduodenoscopy (N/A, 6/20/2017)  Family History: family history is not on file  Social History:  reports that he quit smoking about 2 years ago  His smoking use included cigarettes and cigars  He quit after 10 00 years of use  He has never used smokeless tobacco  He reports that he drinks alcohol  He reports that he has current or past drug history  Drug: Marijuana    Current Medications: has a current medication list which includes the following prescription(s): multivitamin and enoxaparin  Allergies: is allergic to nsaids  Review of Systems:  General- denies fever/chills  HEENT- denies hearing loss or sore throat  Eyes- denies eye pain or visual disturbances, denies red eyes  Respiratory- denies cough or SOB  Cardio- denies chest pain or palpitations  GI- denies abdominal pain  Endocrine- denies urinary frequency  Urinary- denies pain with urination  Musculoskeletal- Negative except noted above  Skin- denies rashes or wounds  Neurological- denies dizziness or headache  Psychiatric- denies anxiety or difficulty concentrating    Physical Exam:   /82 (BP Location: Left arm, Patient Position: Sitting, Cuff Size: Standard)   Pulse 83   Ht 5' 10" (1 778 m)   BMI 39 46 kg/m²   General/Constitutional: No apparent distress: well-nourished and well developed  Eyes: normal ocular motion  Lymphatic: No appreciable lymphadenopathy  Respiratory: Non-labored breathing  Vascular: No edema, swelling or tenderness, except as noted in detailed exam   Integumentary: No impressive skin lesions present, except as noted in detailed exam   Neuro: No ataxia or tremors noted  Psych: Normal mood and affect, oriented to person, place and time  Appropriate affect  Musculoskeletal: Normal, except as noted in detailed exam and in HPI  Examination    Right    Gait He is weight bearing in a cam walker boot  Musculoskeletal Tender to palpation at the peroneal tendon    Skin Normal       Nails Normal    Range of Motion  10 degrees dorsiflexion, 30 degrees plantarflexion  Subtalar motion: wnl, pes cavus foot structure present    Stability Stable    Muscle Strength 5/5 tibialis anterior  5/5 gastrocnemius-soleus  5/5 posterior tibialis  3/5 peroneal/eversion strength  5/5 EHL  5/5 FHL    Neurologic Normal    Sensation Intact to light touch throughout sural, saphenous, superficial peroneal, deep peroneal and medial/lateral plantar nerve distributions    Clyde-Any 5 07 filament (10g) testing deferred  Cardiovascular Brisk capillary refill < 2 seconds,intact DP and PT pulses    Special Tests Anterior Drawer:  Negative  Imaging Studies:   3 views of the right ankl were taken, reviewed and interpreted independently that demonstrate no fracture or dislocation  MRI right ankle:  Demonstrates retracted peroneal brevis rupture with associated edema  Reviewed by me personally  Scribe Attestation    I,:   Shanell Patino am acting as a scribe while in the presence of the attending physician :        I,:   Koki Avila MD personally performed the services described in this documentation    as scribed in my presence :            Robin Quaker Lachman, MD  Foot & Ankle Surgery   Department of [address] [place] [address]      I personally performed the service  Robin Quaker Lachman, MD

## 2019-03-22 NOTE — H&P (VIEW-ONLY)
TRU Garcia  Attending, Orthopaedic Surgery  Foot and [facility] [address] [facility]        ORTHOPAEDIC FOOT AND ANKLE CLINIC VISIT     Assessment:     Encounter Diagnosis   Name Primary?  Peroneal tendon tear, right, initial encounter Yes              Plan:   · The patient verbalized understanding of exam findings and treatment plan  We engaged in the shared decision-making process and treatment options were discussed at length with the patient  Surgical and conservative management discussed today along with risks and benefits  · He is to have surgery for peroneal tendon exploration possible peroneal tendon repair vs  Tenodesis  He needs to be on Lovenox for dvt prophylaxis post surgery due to previous bariatric surgery  · He is to continue in high tide cam walker boot until surgery date  · May continue with rest, ice , elevation  Return for post operative visit       CONSENT FOR SOFT TISSUE PROCEDURES:   Patient understands that there is no guarantee that the surgery will relieve all of their pain and also understands that there may be a prolonged course of protected weight-bearing status required which will restrict them from driving and other activities as discussed at today's visit  Patient recognizes that there are risks with surgery including bleeding, numbness, nerve irritation, wound complications, infection, continued pain, anesthetic complications, death, failure of procedure and possible need for further surgery  The patient understands that there is no guarantee that this surgery will relieve all of His pain and symptoms  Patient understands that there is no guarantee that they will return to full function after the procedure  Patient has provided informed consent for the procedure         History of Present Illness:   Chief Complaint:   Chief Complaint   Patient presents with    Right Ankle - Pain     Alfonso Dozier is a 37 y o  male who is being seen for right ankle pain x 2 weeks  He states while at work he was walking down a conde when he felt and heard a pop coming from his left ankle with sharp, shooting pain going up the lateral aspect of his leg  Pain is localized at lateral ankle along the peroneal tendons  with minimal radiating and described as sharp and severe  Patient denies numbness, tingling or radicular pain  Denies history of neuropathy  Patient does not smoke, does not have diabetes and does not take blood thinners  Patient denies family history of anesthesia complications and has not had any complications with anesthesia  Pain/symptom timing:  Worse during the day when active  Pain/symptom context:  Worse with activites and work  Pain/symptom modifying factors:  Rest makes better, activities make worse  Pain/symptom associated signs/symptoms: none    Prior treatment   · NSAIDsYes    · Injections No   · Bracing/Orthotics Yes   · Physical Therapy No     Orthopedic Surgical History:   See below    Past Medical, Surgical and Social History:  Past Medical History:  has a past medical history of Allergy to environmental factors, Asthma, Full dentures, Migraine, Morbid obesity ([address] ), Rash, and Wears glasses  Problem List: does not have any pertinent problems on file  Past Surgical History:  has a past surgical history that includes Dental surgery; Tonsillectomy; pr egd transoral biopsy single/multiple (N/A, 5/3/2017); pr lap, janice restrict proc, longitudinal gastrectomy (N/A, 6/20/2017); and Esophagogastroduodenoscopy (N/A, 6/20/2017)  Family History: family history is not on file  Social History:  reports that he quit smoking about 2 years ago  His smoking use included cigarettes and cigars  He quit after 10 00 years of use  He has never used smokeless tobacco  He reports that he drinks alcohol  He reports that he has current or past drug history  Drug: Marijuana    Current Medications: has a current medication list which includes the following prescription(s): multivitamin and enoxaparin  Allergies: is allergic to nsaids  Review of Systems:  General- denies fever/chills  HEENT- denies hearing loss or sore throat  Eyes- denies eye pain or visual disturbances, denies red eyes  Respiratory- denies cough or SOB  Cardio- denies chest pain or palpitations  GI- denies abdominal pain  Endocrine- denies urinary frequency  Urinary- denies pain with urination  Musculoskeletal- Negative except noted above  Skin- denies rashes or wounds  Neurological- denies dizziness or headache  Psychiatric- denies anxiety or difficulty concentrating    Physical Exam:   /82 (BP Location: Left arm, Patient Position: Sitting, Cuff Size: Standard)   Pulse 83   Ht 5' 10" (1 778 m)   BMI 39 46 kg/m²   General/Constitutional: No apparent distress: well-nourished and well developed  Eyes: normal ocular motion  Lymphatic: No appreciable lymphadenopathy  Respiratory: Non-labored breathing  Vascular: No edema, swelling or tenderness, except as noted in detailed exam   Integumentary: No impressive skin lesions present, except as noted in detailed exam   Neuro: No ataxia or tremors noted  Psych: Normal mood and affect, oriented to person, place and time  Appropriate affect  Musculoskeletal: Normal, except as noted in detailed exam and in HPI  Examination    Right    Gait He is weight bearing in a cam walker boot  Musculoskeletal Tender to palpation at the peroneal tendon    Skin Normal       Nails Normal    Range of Motion  10 degrees dorsiflexion, 30 degrees plantarflexion  Subtalar motion: wnl, pes cavus foot structure present    Stability Stable    Muscle Strength 5/5 tibialis anterior  5/5 gastrocnemius-soleus  5/5 posterior tibialis  3/5 peroneal/eversion strength  5/5 EHL  5/5 FHL    Neurologic Normal    Sensation Intact to light touch throughout sural, saphenous, superficial peroneal, deep peroneal and medial/lateral plantar nerve distributions    [place]-Any 5 07 filament (10g) testing deferred  Cardiovascular Brisk capillary refill < 2 seconds,intact DP and PT pulses    Special Tests Anterior Drawer:  Negative  Imaging Studies:   3 views of the right ankl were taken, reviewed and interpreted independently that demonstrate no fracture or dislocation  MRI right ankle:  Demonstrates retracted peroneal brevis rupture with associated edema  Reviewed by me personally  Scribe Attestation    I,:   Aidee Rosen am acting as a scribe while in the presence of the attending physician :        I,:   Cassie Starkey MD personally performed the services described in this documentation    as scribed in my presence :            Cleon Ginsberg Lachman, MD  Foot & Ankle Surgery   Department of [address] [place] [address]      I personally performed the service  Cleon Ginsberg Lachman, MD

## 2019-03-22 NOTE — PATIENT INSTRUCTIONS
TRU Dong  Attending, [address] [place] [address] Office Phone: [phone] ? Fax: [phone]  [facility] Office Phone: [phone] ? TMR:[phone]    : Brittany Berkowitz) [place], [place]    Surgery Coordinator [place]: Robyn Ashley, [phone]  Surgery Coordinator [facility]:  Rosa Mcgrath, [phone]  www [email or web address]   PRE-OPERATIVE AND POST-OPERATIVE INSTRUCTIONS    General Information:   Your surgery is with Dr Tom Richards  Dates can change (although rare) depending on emergencies   Typical post operative visits are at the following intervals:  2-3 weeks post surgery, 6 weeks post surgery, 3 months post surgery, 6 months post surgery, and then on a yearly basis  However, this may change based on Dr Phuc Amaya recommendation   #1 post-operative rule for foot/ankle surgery:  ONCE YOU ARE OUT OF YOUR CAST AND/OR REMOVABLE BOOT, SWELLING MAY PERSIST FOR MANY MONTHS  YOU MIGHT ALSO EXPERIENCE A BLUISH DISCOLORATION OF YOUR LEG  THIS IS NORMAL AND PART OF THE USUAL POSTOPERATIVE EXPERIENCE  SMOKING:   Smoking results in incomplete healing of fractures (broken bones) and joints that my have been fused  Smoking and nicotine also prevents the growth of bone into ankle replacements and bone healing  It also slows the healing of muscles and skin (soft tissue)  Therefore, please do not have surgery if you continue to smoke  We reserve the right to cancel your surgery if we suspect that you are smoking  DO NOT use nicorette gum or other patches  Please find an alternative method to quit smoking before your surgery  Pre-Operative Information:   Surgery date and preoperative visits:  a   If you have medical problems, such as an abnormal EKG, history of BLOOD CLOT, ANEURYSM, and any other heart condition, please inform us so that we can get your medical clearance several weeks before the surgery  Please bring any important medical information, such as an EKG, chest x-ray, or echocardiogram, with you to ensure that your surgery will not be delayed  b  If needed, you will receive your preoperative appointments in the mail or by phone from our scheduling office  The location of the preoperative appointment will be given to you also   c  You may not eat after midnight the night before surgery  If you do, your surgery will be cancelled  jax Sepulveda will receive a phone call from your surgery center the day before your surgery (if your surgery is on a Monday, you will get a call the Friday before)  If you do not hear from someone by 4pm the day before your surgery, please call the Surgical coordinator (number above) to notify us   Because bacterial can often enter any defect in the skin, it is important to avoid any cuts before surgery  Any breaks in the skin on the leg will often result in your surgery being postponed  Please avoid going on a very long walk the day prior to surgery, or doing other activities that could lead to irritation of the skin, including yard work, extra athletic activity, or shaving  This could result in surgery cancellation   You MUST be fasting the day of your surgery  Therefore, please do not consume any foot or beverage after midnight the night before surgery  The morning of surgery you may take your usual medications with a sip of water   It is important not to take anti-inflammatory medication like Ibuprofen, Motrin, Naproxen (Aleve), or Aspirin 7-10 days before surgery because they will make you bleed more than usual   Vitamin, E, Plavix and Coumadin also have the same effect  Stop Aspirin and Vitamin E two weeks before surgery  YOUR MEDICAL DOCTOR SHOULD TELL YOU WHEN TO STOP COUMADIN OR PLAVIX   If your surgery involves any bone healing, please do not take anti-inflammatories for at least 6 weeks after surgery    This can impede bone healing (ibuprofen, Aleve, Relafen, iodine)  Tylenol is fine to take  PREOPERATIVE BATHING INSTRUCTIONS:     Before your surgery, bathe with Hibiclens (4% Chlorhexidene) as instructed below  This skin cleanser will help reduce the bacteria on your skin before surgery  To avoid irritating your eyes, do not apply Hibiclens above the level of your neck   o On the evening before AND the morning of surgery, bathe your entire body except the face and scalp, then rinse freely  o DO NOT apply to your face or scalp, as Hibiclens can irritate your eyes   Purchasing information:   Hibiclens is available without a prescription at [facility]  ADDITIONAL INSTRUCTIONS:  PATIENTS HAVING FOOT/ANKLE SURGERY     In preparation for your upcoming surgery, we kindly request and advise the following:   Notify our office if you are taking any of the following:  Coumadin (warfarin):  Persantine (dipyridamole); Pletal (cilostazol); Plavix (clopidogrel); Ticlid (ticlopidine); Agrylin (anagrelide); Aggrenox (dipyridamole and aspirin) or other blood thinners,   In addition, stop taking Vitamin E and herbal supplements   Do on schedule any elective dental work for at least 6 months after surgery  If you had an ankle replacement, you will need to take antibiotics before any future dental procedures  Your dentist or our office can prescribe these for you  An information sheet will be given to you to give to your dentist regarding these precautions  THREE RULES:    1  After surgery you will most likely be given the instructions KEEP YOUR TOES ABOVE YOUR NOSE    This means that you MUST have your feet elevated higher than your hear  Keeping your toes above your nose helps to heal the muscles and skin (soft tissues) by reducing swelling in your leg  This position also helps to prevent infection, and is very important in avoiding deep venous thrombosis (blood clots)      2  In order to keep the blood circulating in your legs and in order to avoid deep vein   thrombosis (blood clots), we ask patients to GET UP ONCE AN HOUR during the day  This means you should at least cross the room and come back  It does not mean you have to be up for long periods of time  In most cases we will not have people immediately put any weight on their operated part  This is important to prevent loosening of metal or other devices holding the bones together  It also prevents irritation of the soft tissues which can lead to prolonged healing  When we say get up once an hour, please walk, hop or move with an assisted device  This is important! 3  Do not do any excessive walking during the first few days after surgery  Recovering from surgery is a full-time task for the patient  Postoperative care is important to avoid irritating the skin incision, which can lead to infection  Please do not plan activities or go out of town for several weeks after surgery  If you are unsure about your future activities, please schedule surgery only when you know it is acceptable for you  Scheduling surgery and then canceling the date, prevents other people from having surgery on that date as it takes time to line everything up effectively  If you cancel your surgery the week of your planned surgery, we reserve the right to cancel all future surgical procedures  THE DAY OF SURGERY:     Arrival to the hospital or outpatient surgical center on time is imperative  If you arrive late, then your surgery will be cancelled  You MUST have a family member/friend bring you, stay with you throughout the DURATION of your surgery, and drive you home   You MUST be fasting the day of your surgery  Therefore, do not consume any food or beverage after midnight the night before surgery    At your pre-operative visit with the anesthesia staff, or during your phone screen, a nurse will instruct you what medications you will need to take the day of surgery  AFTER YOUR SURGERY:   Bleeding through the bandage almost always occurs  Do not let this alarm you  Simply add more gauze or a towel, call us, and come in for a dressing change  If you think it is excessive, contact us immediately or go to the local emergency room   Do not get the bandage wet  Showering is possible with plastic protectors  Be very careful, as the bathroom can be wet and slippery  If you do get your dressing wet, it should be changed immediately  Please contact us   ONCE YOUR ARE OUT OF YOUR CAST AND/OR REMOVABLE BOOT, SWELLING MAY PERSIST FOR MANY MONTHS  YOU MIGHT ALSO EXPERIENCE A BLUISH DISCOLORATION OF YOUR LEG  THIS IS NORMAL AND PART OF THE USUAL POSTOPERATIVE EXPERIENCE  WEARING COMPRESSION HOSE (ELASTIC STOCKINGS) CAN HELP AVOID SOME OF THIS SWELLING  DRESSING:   The purpose of the surgical dressing is to keep your wound and the surgical site protected from the environment  Most dressings contain splints, which help to hold your foot and ankle in a corrected position, and also allow the surgical site to heal properly  If you have a drain in place, this will need to be removed in 1-3 days after surgery  The time for the drain to be pulled will be written on your discharge instruction sheet  CAST  INSTRUCTIONS:  You may or may not get a cast following surgery  If you do, pay close attention to the following:     After application of a splint or cast, it is very important to elevate your leg for 24 to 72 hours  The injured area should be elevated well above the heart  Remember Toes above your Nose  Rest and elevation greatly reduce pain and speed the healing process by minimizing early swelling      CALL YOUR DOCTORS OFFICE OR VISIT LOCATION EMERGENCY ROOM IF YOU HAVE ANY OF THE FOLLOWING:     Significant increased pain, which may be caused by swelling, and the feeling that the splint or cast is too tight   Numbness and tingling in your hand or foot, which may be caused by too much pressure on the nerves   Burning and stinging, which may be caused by too much pressure on the skin   Excessive swelling below the cast, which may mean the cast is slowing your blood circulation   Loss of active movement of toes, which request an urgent evaluation   Loss of capillary refill  Pinch the tip of toes and sona the skin  Release pressure and if the skin does not return pink then call the office immediately  DO NOT GET YOUR CAST WET  Bacteria thrive in moist dark areas  We do not want this  If your cast becomes wet, return to the office and we will apply another one  PAIN AFTER SURGERY:  Narcotic pain medication can and will depress your respiratory system if taken in excess  The goal of pain management with narcotics is to be comfortable not pain free  If you take enough narcotics to be pain free then you run the risk of stopping breathing  If this happens, call 911 immediately!  Pain in the heel is often  caused by pressure from the weight of your foot on the bed  Make sure your heel is suspended off the bed by keeping a pillow underneath your calf not your knee  Medications: You will be given narcotic pain medication  Do NOT drive while taking narcotic medications  Medications such as Darvocet, Percocet, Vicoden or Tylenol #3, also contain acetaminophen (Tylenol)  Do not take acetaminophen or Tylenol from home when taking theses medications  When you fill your prescription, you may ask the pharmacist if your pain medication has acetaminophen/Tylenol in it  It is okay to take Tylenol with Oxycontin/Oxycodone  Should you have pain after taking your prescription medication, ibuprophen (Motrin, Advil, and Alleve) is a common over the counter preparation and may often be taken with the prescription pain medication as long as you take them with food  These medications can irritate the stomach lining     Unless you are allergic to aspirin or currently taking a blood thinner, Dr Soledad Antony patients are requested to take one 325 mg aspirin every 12 hours until you are back to walking normally after surgery (This can be up to 6 weeks)  Narcotic medications commonly cause nausea  Taking them with food will decrease this side effect  If you are having extreme nausea, please contact us for an alternative medication or for something that can be taken with this medication to decrease the nausea  Also, narcotic medications frequently cause constipation  An increase of fiber, fruits and vegetables in your diet may alleviate this problem, or if necessary, you may use an over-the-counter medication such as senekot, colace, or Fibercon for constipation problems  You should resume all medications you were taking prior to the surgery unless otherwise specified  Activity:   Because of your recent foot surgery, your activity level will decrease  You will need to elevate your foot ABOVE the level of your heart for a minimum of four days  The length of time necessary for the swelling to go down, and for your wounds to heal properly depends greatly on your efforts here  Elevation is extremely important to avoid compromising the blood supply to your foot  Remember when your foot is down it will swell, which will increase pain and slow healing  Wiggle your toes frequently if possible  If you go home with a regional block, (a type of anesthesia) the foot and leg will be numb  Think of ways to get into your house and around the house until the block wears off  Keep in mind that it may be a legal issue if you drive while in a cast or splint, especially when the splint is on the right foot  You may call the Department of Motor Vehicles to schedule a road test if you have adaptive equipment applied to your car  The amount of weight you are allowed to bear on your foot will be written on your discharge sheet filled out at the time of surgery   The following is an explanation of the possibilities:     Non-weight bearing: You are to put NO weight whatsoever on your foot  When using crutches or a walker, your foot should not touch the ground, except when you are standing  Then, it may rest on the ground  If you are to be non-weight bearing, and you are not compliant, you could compromise the surgery  Some of our patients have been requesting prescriptions for a roll-a-bout knee scooter  [employer] and other insurances have been denying these claims, and you may either have to rent one or pay out of pocket to purchase one

## 2019-03-25 ENCOUNTER — ANESTHESIA EVENT (OUTPATIENT)
Dept: PERIOP | Facility: AMBULARY SURGERY CENTER | Age: 34
End: 2019-03-25
Payer: COMMERCIAL

## 2019-03-25 LAB
ATRIAL RATE: 65 BPM
P AXIS: 45 DEGREES
PR INTERVAL: 150 MS
QRS AXIS: -15 DEGREES
QRSD INTERVAL: 96 MS
QT INTERVAL: 388 MS
QTC INTERVAL: 403 MS
T WAVE AXIS: 7 DEGREES
VENTRICULAR RATE: 65 BPM

## 2019-03-25 PROCEDURE — 93010 ELECTROCARDIOGRAM REPORT: CPT | Performed by: INTERNAL MEDICINE

## 2019-03-25 RX ORDER — TRAMADOL HYDROCHLORIDE 50 MG/1
50 TABLET ORAL EVERY 6 HOURS PRN
COMMUNITY
End: 2019-03-27 | Stop reason: HOSPADM

## 2019-03-25 NOTE — PRE-PROCEDURE INSTRUCTIONS
Pre-Surgery Instructions:   Medication Instructions    multivitamin [facility]) TABS Patient was instructed by Physician and understands  Pre op and bathing instructions reviewed   Pt has hibiclens

## 2019-03-27 ENCOUNTER — HOSPITAL ENCOUNTER (OUTPATIENT)
Facility: AMBULARY SURGERY CENTER | Age: 34
Setting detail: OUTPATIENT SURGERY
Discharge: HOME | End: 2019-03-27
Attending: ORTHOPAEDIC SURGERY | Admitting: ORTHOPAEDIC SURGERY
Payer: COMMERCIAL

## 2019-03-27 ENCOUNTER — ANESTHESIA (OUTPATIENT)
Dept: PERIOP | Facility: AMBULARY SURGERY CENTER | Age: 34
End: 2019-03-27
Payer: COMMERCIAL

## 2019-03-27 ENCOUNTER — APPOINTMENT (OUTPATIENT)
Dept: RADIOLOGY | Facility: AMBULARY SURGERY CENTER | Age: 34
End: 2019-03-27
Payer: COMMERCIAL

## 2019-03-27 VITALS — DIASTOLIC BLOOD PRESSURE: 88 MMHG | OXYGEN SATURATION: 96 % | HEART RATE: 85 BPM | SYSTOLIC BLOOD PRESSURE: 135 MMHG

## 2019-03-27 VITALS
DIASTOLIC BLOOD PRESSURE: 73 MMHG | SYSTOLIC BLOOD PRESSURE: 115 MMHG | WEIGHT: 272 LBS | OXYGEN SATURATION: 98 % | RESPIRATION RATE: 18 BRPM | HEIGHT: 70 IN | TEMPERATURE: 97 F | HEART RATE: 62 BPM | BODY MASS INDEX: 38.94 KG/M2

## 2019-03-27 DIAGNOSIS — S86.311A PERONEAL TENDON TEAR, RIGHT, INITIAL ENCOUNTER: Primary | ICD-10-CM

## 2019-03-27 PROCEDURE — C9290 INJ, BUPIVACAINE LIPOSOME: HCPCS | Performed by: ORTHOPAEDIC SURGERY

## 2019-03-27 PROCEDURE — 27691 REVISE LOWER LEG TENDON: CPT | Performed by: ORTHOPAEDIC SURGERY

## 2019-03-27 RX ORDER — MIDAZOLAM HYDROCHLORIDE 1 MG/ML
INJECTION INTRAMUSCULAR; INTRAVENOUS AS NEEDED
Status: DISCONTINUED | OUTPATIENT
Start: 2019-03-27 | End: 2019-03-27 | Stop reason: SURG

## 2019-03-27 RX ORDER — GLYCOPYRROLATE 0.2 MG/ML
INJECTION INTRAMUSCULAR; INTRAVENOUS AS NEEDED
Status: DISCONTINUED | OUTPATIENT
Start: 2019-03-27 | End: 2019-03-27 | Stop reason: SURG

## 2019-03-27 RX ORDER — CEFAZOLIN SODIUM 1 G/50ML
1000 SOLUTION INTRAVENOUS ONCE
Status: COMPLETED | OUTPATIENT
Start: 2019-03-27 | End: 2019-03-27

## 2019-03-27 RX ORDER — ROCURONIUM BROMIDE 10 MG/ML
INJECTION, SOLUTION INTRAVENOUS AS NEEDED
Status: DISCONTINUED | OUTPATIENT
Start: 2019-03-27 | End: 2019-03-27 | Stop reason: SURG

## 2019-03-27 RX ORDER — BUPIVACAINE HYDROCHLORIDE 2.5 MG/ML
INJECTION, SOLUTION INFILTRATION; PERINEURAL AS NEEDED
Status: DISCONTINUED | OUTPATIENT
Start: 2019-03-27 | End: 2019-03-27 | Stop reason: HOSPADM

## 2019-03-27 RX ORDER — ONDANSETRON 4 MG/1
4 TABLET, FILM COATED ORAL EVERY 8 HOURS PRN
Qty: 20 TABLET | Refills: 0 | Status: SHIPPED | OUTPATIENT
Start: 2019-03-27 | End: 2019-05-07

## 2019-03-27 RX ORDER — CEFAZOLIN SODIUM 2 G/50ML
2000 SOLUTION INTRAVENOUS ONCE
Status: COMPLETED | OUTPATIENT
Start: 2019-03-27 | End: 2019-03-27

## 2019-03-27 RX ORDER — ONDANSETRON 2 MG/ML
INJECTION INTRAMUSCULAR; INTRAVENOUS AS NEEDED
Status: DISCONTINUED | OUTPATIENT
Start: 2019-03-27 | End: 2019-03-27 | Stop reason: SURG

## 2019-03-27 RX ORDER — SODIUM CHLORIDE 9 MG/ML
INJECTION, SOLUTION INTRAVENOUS CONTINUOUS PRN
Status: DISCONTINUED | OUTPATIENT
Start: 2019-03-27 | End: 2019-03-27 | Stop reason: SURG

## 2019-03-27 RX ORDER — DEXAMETHASONE SODIUM PHOSPHATE 10 MG/ML
INJECTION, SOLUTION INTRAMUSCULAR; INTRAVENOUS AS NEEDED
Status: DISCONTINUED | OUTPATIENT
Start: 2019-03-27 | End: 2019-03-27 | Stop reason: SURG

## 2019-03-27 RX ORDER — ROPIVACAINE HYDROCHLORIDE 5 MG/ML
INJECTION, SOLUTION EPIDURAL; INFILTRATION; PERINEURAL
Status: COMPLETED | OUTPATIENT
Start: 2019-03-27 | End: 2019-03-27

## 2019-03-27 RX ORDER — OXYCODONE HYDROCHLORIDE 5 MG/1
5 TABLET ORAL EVERY 4 HOURS PRN
Qty: 30 TABLET | Refills: 0 | Status: SHIPPED | OUTPATIENT
Start: 2019-03-27 | End: 2019-04-01

## 2019-03-27 RX ORDER — ONDANSETRON 2 MG/ML
4 INJECTION INTRAMUSCULAR; INTRAVENOUS ONCE AS NEEDED
Status: DISCONTINUED | OUTPATIENT
Start: 2019-03-27 | End: 2019-03-27 | Stop reason: HOSPADM

## 2019-03-27 RX ORDER — FENTANYL CITRATE 50 UG/ML
INJECTION, SOLUTION INTRAMUSCULAR; INTRAVENOUS AS NEEDED
Status: DISCONTINUED | OUTPATIENT
Start: 2019-03-27 | End: 2019-03-27 | Stop reason: SURG

## 2019-03-27 RX ORDER — LIDOCAINE HYDROCHLORIDE 10 MG/ML
INJECTION, SOLUTION INFILTRATION; PERINEURAL AS NEEDED
Status: DISCONTINUED | OUTPATIENT
Start: 2019-03-27 | End: 2019-03-27 | Stop reason: SURG

## 2019-03-27 RX ORDER — FENTANYL CITRATE/PF 50 MCG/ML
50 SYRINGE (ML) INJECTION
Status: DISCONTINUED | OUTPATIENT
Start: 2019-03-27 | End: 2019-03-27 | Stop reason: HOSPADM

## 2019-03-27 RX ORDER — PROPOFOL 10 MG/ML
INJECTION, EMULSION INTRAVENOUS AS NEEDED
Status: DISCONTINUED | OUTPATIENT
Start: 2019-03-27 | End: 2019-03-27 | Stop reason: SURG

## 2019-03-27 RX ORDER — CHLORHEXIDINE GLUCONATE 4 G/100ML
SOLUTION TOPICAL DAILY PRN
Status: DISCONTINUED | OUTPATIENT
Start: 2019-03-27 | End: 2019-03-27 | Stop reason: HOSPADM

## 2019-03-27 RX ORDER — MAGNESIUM HYDROXIDE 1200 MG/15ML
LIQUID ORAL AS NEEDED
Status: DISCONTINUED | OUTPATIENT
Start: 2019-03-27 | End: 2019-03-27 | Stop reason: HOSPADM

## 2019-03-27 RX ORDER — NEOSTIGMINE METHYLSULFATE 1 MG/ML
INJECTION INTRAVENOUS AS NEEDED
Status: DISCONTINUED | OUTPATIENT
Start: 2019-03-27 | End: 2019-03-27 | Stop reason: SURG

## 2019-03-27 RX ORDER — SODIUM CHLORIDE, SODIUM LACTATE, POTASSIUM CHLORIDE, CALCIUM CHLORIDE 600; 310; 30; 20 MG/100ML; MG/100ML; MG/100ML; MG/100ML
50 INJECTION, SOLUTION INTRAVENOUS CONTINUOUS
Status: DISCONTINUED | OUTPATIENT
Start: 2019-03-27 | End: 2019-03-27 | Stop reason: HOSPADM

## 2019-03-27 RX ORDER — VANCOMYCIN HYDROCHLORIDE 1 G/20ML
INJECTION, POWDER, LYOPHILIZED, FOR SOLUTION INTRAVENOUS AS NEEDED
Status: DISCONTINUED | OUTPATIENT
Start: 2019-03-27 | End: 2019-03-27 | Stop reason: HOSPADM

## 2019-03-27 RX ADMIN — CEFAZOLIN SODIUM 1000 MG: 1 SOLUTION INTRAVENOUS at 08:44

## 2019-03-27 RX ADMIN — CEFAZOLIN SODIUM 2000 MG: 2 SOLUTION INTRAVENOUS at 08:44

## 2019-03-27 RX ADMIN — FENTANYL CITRATE 50 MCG: 50 INJECTION, SOLUTION INTRAMUSCULAR; INTRAVENOUS at 08:48

## 2019-03-27 RX ADMIN — ROCURONIUM BROMIDE 40 MG: 10 INJECTION INTRAVENOUS at 08:48

## 2019-03-27 RX ADMIN — FENTANYL CITRATE 50 MCG: 50 INJECTION, SOLUTION INTRAMUSCULAR; INTRAVENOUS at 09:34

## 2019-03-27 RX ADMIN — LIDOCAINE HYDROCHLORIDE ANHYDROUS 50 MG: 10 INJECTION, SOLUTION INFILTRATION at 08:48

## 2019-03-27 RX ADMIN — ROPIVACAINE HYDROCHLORIDE 25 ML: 5 INJECTION, SOLUTION EPIDURAL; INFILTRATION; PERINEURAL at 07:55

## 2019-03-27 RX ADMIN — MIDAZOLAM HYDROCHLORIDE 2 MG: 1 INJECTION, SOLUTION INTRAMUSCULAR; INTRAVENOUS at 07:48

## 2019-03-27 RX ADMIN — GLYCOPYRROLATE 0.4 MG: 0.2 INJECTION, SOLUTION INTRAMUSCULAR; INTRAVENOUS at 09:30

## 2019-03-27 RX ADMIN — PROPOFOL 300 MG: 10 INJECTION, EMULSION INTRAVENOUS at 08:48

## 2019-03-27 RX ADMIN — ONDANSETRON 4 MG: 2 INJECTION INTRAMUSCULAR; INTRAVENOUS at 09:18

## 2019-03-27 RX ADMIN — DEXAMETHASONE SODIUM PHOSPHATE 4 MG: 10 INJECTION, SOLUTION INTRAMUSCULAR; INTRAVENOUS at 09:08

## 2019-03-27 RX ADMIN — SODIUM CHLORIDE: 0.9 INJECTION, SOLUTION INTRAVENOUS at 08:38

## 2019-03-27 RX ADMIN — NEOSTIGMINE METHYLSULFATE 2 MG: 1 INJECTION INTRAVENOUS at 09:30

## 2019-03-27 NOTE — ANESTHESIA PROCEDURE NOTES
Peripheral Block    Patient location during procedure: holding area  Start time: 3/27/2019 7:55 AM  Reason for block: at surgeon's request and post-op pain management  Staffing  Anesthesiologist: Laverne Londono MD  Performed: anesthesiologist   Preanesthetic Checklist  Completed: patient identified, site marked, surgical consent, pre-op evaluation, timeout performed, IV checked, risks and benefits discussed and monitors and equipment checked  Peripheral Block  Patient position: supine  Prep: ChloraPrep  Patient monitoring: continuous pulse ox and frequent blood pressure checks  Block type: popliteal  Laterality: right  Injection technique: single-shot  Procedures: ultrasound guided  Ultrasound permanent image saved (Ultrasound utilized to facilitate safe and effective placement of local anesthetic)ropivacaine (NAROPIN) 0 5 % perineural infiltration, 25 mL (1:400K Epi + 50 mcg Precedex mixed into Ropiv)  Needle  Needle type: Stimuplex   Needle localization: ultrasound guidance  Assessment  Injection assessment: incremental injection, local visualized surrounding nerve on ultrasound, negative aspiration for heme and no paresthesia on injection  Heart rate change: no  Slow fractionated injection: yes  Post-procedure:  site cleaned  patient tolerated the procedure well with no immediate complications

## 2019-03-27 NOTE — ANESTHESIA PREPROCEDURE EVALUATION
Review of Systems/Medical History    Chart reviewed  No history of anesthetic complications     Cardiovascular  Exercise tolerance (METS): >4,     Pulmonary  Asthma , well controlled/ stable ,   Comment: Asthma resolved since pt has lost a lot of weight     GI/Hepatic    No GERD , Bariatric surgery,   Comment: No NSAIDs and No NGT/OGT 2/2 h/o bariatric surgery     Negative  ROS        Endo/Other  Negative endo/other ROS      GYN       Hematology  Negative hematology ROS   No coagulation disorder ,    Musculoskeletal       Neurology    Headaches,   Comment: H/o migraines Psychology           Physical Exam    Airway    Mallampati score: II  TM Distance: >3 FB  Neck ROM: full     Dental   Comment: edentulous, upper dentures,     Cardiovascular      Pulmonary      Other Findings        Anesthesia Plan  ASA Score- 2     Anesthesia Type- general and regional with ASA Monitors  Additional Monitors:   Airway Plan: ETT  Comment: Right single shot popliteal block  No NGT/OGT, No NSAIDs  Plan Factors-    Induction- intravenous  Postoperative Plan-     Informed Consent- Anesthetic plan and risks discussed with patient  I personally reviewed this patient with the CRNA  Discussed and agreed on the Anesthesia Plan with the PRECIOUS [place]

## 2019-03-27 NOTE — OP NOTE
OPERATIVE REPORT  PATIENT NAME: Alfonso Dozier    :  1985  MRN: [id number]  Pt Location: [facility] OR [facility] 04    SURGERY DATE: 3/27/2019    Surgeon(s) and Role:     * Paris De Leon MD - Primary     * Pineda Hopkins PA-C - Jose Call, YOLISM - Assisting     * Freda Villeda DPM - Assisting    Preop Diagnosis:  Peroneal tendon tear, right, initial encounter [S86 311A]    Post-Op Diagnosis Codes:     * Peroneal tendon tear, right, initial encounter [S86 311A]    Procedure(s) (LRB):  REPAIR TENDON FOOT, Peroneal tendon exploration with possible repair versus tenodesis (Right)    Specimen(s):  * No specimens in log *    Estimated Blood Loss:   Minimal    Drains:  * No LDAs found *    Anesthesia Type:   Choice    Operative Indications:  Peroneal tendon tear, right, initial encounter [X29 642E]      Operative Findings:  Complete rupture of peroneus brevis    Complications:   None    Procedure and Technique:  1  Peroneal tendon exploration, debridement and synovectomy  2  Peroneus Brevis tenodesis to Peroneus longus  3  Placement into a short leg plaster nonweightbearing ankle splint  A curvilinear incision was made along the lateral ankle centered over the peroneal tendons just posterior to the fibula  Upon incising the skin, the peroneal retinaculum was noted and then split longitudinally to expose the tendons  We were careful to avoid the sural nerve and we left a 1mm cuff off of the posterior fibula to ease in repair of the retinaculum  The peroneal tendons were not found to be intact, with the peroneus brevis tendon completely ruptured  There was a gap approximately 9cm that could not be traversed to reapproximate the tendons  The decision was made to tenodese the brevis to longus to maintain the eversion function of the hindfoot  We explored the longus and there were some adhesions, the tendons were tenolysed   The tendons easily reduced into the groove but we retracted them anteriorly to continue the inspection  After concluding there were no other tendon injuries, we tied the proximal stump of the peroneus brevis to the peroneus longus which was intact with 2-0 ethibond suture  The tendons were reduced and glided well with ankle ranging  The retinaculum and the periosteal flap were reinforced with Ethibond suture  The foot was then taken through an aggressive range of motion to ensure the strength of the repair  The wound was copiously irrigated  Vancomycin powder was spread in the wound bed  We then closed the wound in a layers fashion with 2-0 vicryl deep, 4-0 vicryl superficially, and then 3-0 nylon in the skin  The ankle was splinted in neutral dorsiflexion and slight hindfoot eversion         I was present for the entire procedure    Patient Disposition:  PACU     SIGNATURE: Aravind Vega MD  DATE: March 27, 2019  TIME: 9:35 AM

## 2019-03-27 NOTE — INTERVAL H&P NOTE
H&P reviewed  After examining the patient I find no changes in the patients condition since the H&P had been written  Plan for right peroneal tendon exploration and repair versus debridement versus tenodesis versus reconstruction as necessary

## 2019-03-27 NOTE — DISCHARGE INSTRUCTIONS
TRU Diaz  Attending, [address] [place] [address] Office Phone: [phone] ? Fax: [phone]  [facility] Office Phone: [phone] ? ALBA:[phone]    : [facility] [place]) [place], [place]    Surgery Coordinator [place]: Migel Fernandez, [phone]  Surgery Coordinator [facility]:  Julius Kc, [phone]  www [email or web address]   PRE-OPERATIVE AND POST-OPERATIVE INSTRUCTIONS    General Information:   Typical post operative visits are at the following intervals:  2-3 weeks post surgery, 6 weeks post surgery, 3 months post surgery, 6 months post surgery, and then on a yearly basis  However, this may change based on Dr Yolande Hayes recommendation   #1 post-operative rule for foot/ankle surgery:  ONCE YOU ARE OUT OF YOUR CAST AND/OR REMOVABLE BOOT, SWELLING MAY PERSIST FOR MANY MONTHS  YOU MIGHT ALSO EXPERIENCE A BLUISH DISCOLORATION OF YOUR LEG  THIS IS NORMAL AND PART OF THE USUAL POSTOPERATIVE EXPERIENCE    DO NOT WAIT UNTIL YOUR BLOCK WEARS OFF TO TAKE YOUR PAIN MEDICATION  IT TAKES A FEW DOSES OF THE PAIN MEDICATION TO REACH A THERAPEUTIC LEVEL  TAKE A TABLET PROACTIVELY BEFORE YOU HAVE ANY PAIN AND AGAIN 4 HOURS LATER SO WHEN THE BLOCK WEARS OFF, YOU ARE NOT CAUGHT OFF GUARD  SMOKING:   Smoking results in incomplete healing of fractures (broken bones) and joints that my have been fused  Smoking and nicotine also prevents the growth of bone into ankle replacements and bone healing  It also slows the healing of muscles and skin (soft tissue)  Therefore, please do not have surgery if you continue to smoke  We reserve the right to cancel your surgery if we suspect that you are smoking  DO NOT use nicorette gum or other patches  Please find an alternative method to quit smoking before your surgery and do not restart after surgery to allow for healing  THREE RULES:    1   After surgery you will most likely be given the instructions KEEP YOUR TOES ABOVE YOUR NOSE    This means that you MUST have your feet elevated higher than your hear  Keeping your toes above your nose helps to heal the muscles and skin (soft tissues) by reducing swelling in your leg  This position also helps to prevent infection, and is very important in avoiding deep venous thrombosis (blood clots)  2  In order to keep the blood circulating in your legs and in order to avoid deep vein   thrombosis (blood clots), we ask patients to GET UP ONCE AN HOUR during the day  This means you should at least cross the room and come back  It does not mean you have to be up for long periods of time  In most cases we will not have people immediately put any weight on their operated part  This is important to prevent loosening of metal or other devices holding the bones together  It also prevents irritation of the soft tissues which can lead to prolonged healing  When we say get up once an hour, please walk, hop or move with an assisted device  This is important! 3  Do not do any excessive walking during the first few days after surgery  Recovering from surgery is a full-time task for the patient  Postoperative care is important to avoid irritating the skin incision, which can lead to infection  Please do not plan activities or go out of town for several weeks after surgery  If you are unsure about your future activities, please schedule surgery only when you know it is acceptable for you  Scheduling surgery and then canceling the date, prevents other people from having surgery on that date as it takes time to line everything up effectively  If you cancel your surgery the week of your planned surgery, we reserve the right to cancel all future surgical procedures  AFTER YOUR SURGERY:   Bleeding through the bandage almost always occurs  Do not let this alarm you    Simply add more gauze or a towel, call us, and come in for a dressing change  If you think it is excessive, contact us immediately or go to the local emergency room   Do not get the bandage wet  Showering is possible with plastic protectors  Be very careful, as the bathroom can be wet and slippery  If you do get your dressing wet, it should be changed immediately  Please contact us   ONCE YOUR ARE OUT OF YOUR CAST AND/OR REMOVABLE BOOT, SWELLING MAY PERSIST FOR MANY MONTHS  YOU MIGHT ALSO EXPERIENCE A BLUISH DISCOLORATION OF YOUR LEG  THIS IS NORMAL AND PART OF THE USUAL POSTOPERATIVE EXPERIENCE  WEARING COMPRESSION HOSE (ELASTIC STOCKINGS) CAN HELP AVOID SOME OF THIS SWELLING  DRESSING:   The purpose of the surgical dressing is to keep your wound and the surgical site protected from the environment  Most dressings contain splints, which help to hold your foot and ankle in a corrected position, and also allow the surgical site to heal properly  If you have a drain in place, this will need to be removed in 1-3 days after surgery  The time for the drain to be pulled will be written on your discharge instruction sheet  CAST  INSTRUCTIONS:  You may or may not get a cast following surgery  If you do, pay close attention to the following:     After application of a splint or cast, it is very important to elevate your leg for 24 to 72 hours  The injured area should be elevated well above the heart  Remember Toes above your Nose  Rest and elevation greatly reduce pain and speed the healing process by minimizing early swelling      CALL YOUR DOCTORS OFFICE OR VISIT LOCATION EMERGENCY ROOM IF YOU HAVE ANY OF THE FOLLOWING:     Significant increased pain, which may be caused by swelling, and the feeling that the splint or cast is too tight   Numbness and tingling in your hand or foot, which may be caused by too much pressure on the nerves   Burning and stinging, which may be caused by too much pressure on the skin   Excessive swelling below the cast, which may mean the cast is slowing your blood circulation   Loss of active movement of toes, which request an urgent evaluation   Loss of capillary refill  Pinch the tip of toes and sona the skin  Release pressure and if the skin does not return pink then call the office immediately  DO NOT GET YOUR CAST WET  Bacteria thrive in moist dark areas  We do not want this  If your cast becomes wet, return to the office and we will apply another one  PAIN AFTER SURGERY:  Narcotic pain medication can and will depress your respiratory system if taken in excess  The goal of pain management with narcotics is to be comfortable not pain free  If you take enough narcotics to be pain free then you run the risk of stopping breathing  If this happens, call 911 immediately!  Pain in the heel is often  caused by pressure from the weight of your foot on the bed  Make sure your heel is suspended off the bed by keeping a pillow underneath your calf not your knee  Medications: You will be given narcotic pain medication  Do NOT drive while taking narcotic medications  Medications such as Darvocet, Percocet, Vicoden or Tylenol #3, also contain acetaminophen (Tylenol)  Do not take acetaminophen or Tylenol from home when taking theses medications  When you fill your prescription, you may ask the pharmacist if your pain medication has acetaminophen/Tylenol in it  It is okay to take Tylenol with Oxycontin/Oxycodone  Should you have pain after taking your prescription medication, ibuprophen (Motrin, Advil, and Alleve) is a common over the counter preparation and may often be taken with the prescription pain medication as long as you take them with food  These medications can irritate the stomach lining     Unless you are allergic to aspirin or currently taking a blood thinner, Dr Yvonne Lopez patients are requested to take one 325 mg aspirin every 12 hours until you are back to walking normally after surgery (This can be up to 6 weeks)  Ecotrin (Enteric-coated aspirin) is more sensitive to the stomach and we recommend purchasing this instead of regular aspirin to minimize the risk of stomach irritation  Narcotic medications commonly cause nausea  Taking them with food will decrease this side effect  If you are having extreme nausea, please contact us for an alternative medication or for something that can be taken with this medication to decrease the nausea  Also, narcotic medications frequently cause constipation  An increase of fiber, fruits and vegetables in your diet may alleviate this problem, or if necessary, you may use an over-the-counter medication such as senekot, colace, or Fibercon for constipation problems  You should resume all medications you were taking prior to the surgery unless otherwise specified  Activity:   Because of your recent foot surgery, your activity level will decrease  You will need to elevate your foot ABOVE the level of your heart for a minimum of four days  The length of time necessary for the swelling to go down, and for your wounds to heal properly depends greatly on your efforts here  Elevation is extremely important to avoid compromising the blood supply to your foot  Remember when your foot is down it will swell, which will increase pain and slow healing  Wiggle your toes frequently if possible  If you go home with a regional block, (a type of anesthesia) the foot and leg will be numb  Think of ways to get into your house and around the house until the block wears off  Keep in mind that it may be a legal issue if you drive while in a cast or splint, especially when the splint is on the right foot  You may call the Department of Motor Vehicles to schedule a road test if you have adaptive equipment applied to your car  The amount of weight you are allowed to bear on your foot will be written on your discharge sheet filled out at the time of surgery   The following is an explanation of the possibilities:     Non-weight bearing: You are to put NO weight whatsoever on your foot  When using crutches or a walker, your foot should not touch the ground, except when you are standing  Then, it may rest on the ground  If you are to be non-weight bearing, and you are not compliant, you could compromise the surgery  Some of our patients have been requesting prescriptions for a roll-a-bout knee scooter  [employer] and other insurances have been denying these claims, and you may either have to rent one or pay out of pocket to purchase one

## 2019-03-27 NOTE — ANESTHESIA POSTPROCEDURE EVALUATION
Post-Op Assessment Note    CV Status:  Stable    Pain management: adequate     Mental Status:  Alert and awake   Hydration Status:  Euvolemic   PONV Controlled:  Controlled   Airway Patency:  Patent   Post Op Vitals Reviewed: Yes      Staff: CRNA           BP (P) 108/66 (03/27/19 0950)    Temp (!) (P) 96 8 °F (36 °C) (03/27/19 0950)    Pulse (P) 59 (03/27/19 0950)   Resp (P) 16 (03/27/19 0950)    SpO2 (P) 94 % (03/27/19 0950)

## 2019-04-01 ENCOUNTER — TELEPHONE (OUTPATIENT)
Dept: OTHER | Facility: OTHER | Age: 34
End: 2019-04-01

## 2019-04-03 ENCOUNTER — DOCUMENTATION (OUTPATIENT)
Dept: OBGYN CLINIC | Facility: CLINIC | Age: 34
End: 2019-04-03

## 2019-04-16 ENCOUNTER — OFFICE VISIT (OUTPATIENT)
Dept: OBGYN CLINIC | Facility: CLINIC | Age: 34
End: 2019-04-16
Payer: COMMERCIAL

## 2019-04-16 VITALS — SYSTOLIC BLOOD PRESSURE: 119 MMHG | DIASTOLIC BLOOD PRESSURE: 82 MMHG | HEART RATE: 80 BPM

## 2019-04-16 DIAGNOSIS — S86.311A PERONEAL TENDON TEAR, RIGHT, INITIAL ENCOUNTER: ICD-10-CM

## 2019-04-16 PROCEDURE — 99024 POSTOP FOLLOW-UP VISIT: CPT | Performed by: ORTHOPAEDIC SURGERY

## 2019-04-16 PROCEDURE — 29405 APPL SHORT LEG CAST: CPT | Performed by: PHYSICIAN ASSISTANT

## 2019-04-16 RX ORDER — MELOXICAM 15 MG/1
TABLET ORAL DAILY
COMMUNITY
End: 2019-05-07

## 2019-04-17 ENCOUNTER — TELEPHONE (OUTPATIENT)
Dept: OBGYN CLINIC | Facility: HOSPITAL | Age: 34
End: 2019-04-17

## 2019-04-18 ENCOUNTER — TELEPHONE (OUTPATIENT)
Dept: OBGYN CLINIC | Facility: HOSPITAL | Age: 34
End: 2019-04-18

## 2019-05-07 ENCOUNTER — OFFICE VISIT (OUTPATIENT)
Dept: BARIATRICS | Facility: CLINIC | Age: 34
End: 2019-05-07
Payer: COMMERCIAL

## 2019-05-07 VITALS
SYSTOLIC BLOOD PRESSURE: 122 MMHG | BODY MASS INDEX: 38.94 KG/M2 | TEMPERATURE: 97.1 F | DIASTOLIC BLOOD PRESSURE: 84 MMHG | HEART RATE: 72 BPM | HEIGHT: 70 IN | WEIGHT: 272 LBS

## 2019-05-07 DIAGNOSIS — K91.2 POSTSURGICAL MALABSORPTION: ICD-10-CM

## 2019-05-07 DIAGNOSIS — Z98.84 BARIATRIC SURGERY STATUS: Primary | ICD-10-CM

## 2019-05-07 DIAGNOSIS — R63.5 ABNORMAL WEIGHT GAIN: ICD-10-CM

## 2019-05-07 DIAGNOSIS — E55.9 VITAMIN D INSUFFICIENCY: ICD-10-CM

## 2019-05-07 PROBLEM — E66.9 OBESITY (BMI 30-39.9): Status: ACTIVE | Noted: 2017-09-29

## 2019-05-07 PROBLEM — E66.9 OBESITY, CLASS II, BMI 35-39.9: Status: ACTIVE | Noted: 2019-05-07

## 2019-05-07 PROBLEM — E66.812 OBESITY, CLASS II, BMI 35-39.9: Status: ACTIVE | Noted: 2019-05-07

## 2019-05-07 PROCEDURE — 99214 OFFICE O/P EST MOD 30 MIN: CPT | Performed by: PHYSICIAN ASSISTANT

## 2019-05-10 ENCOUNTER — OFFICE VISIT (OUTPATIENT)
Dept: OBGYN CLINIC | Facility: HOSPITAL | Age: 34
End: 2019-05-10

## 2019-05-10 VITALS
HEART RATE: 82 BPM | BODY MASS INDEX: 40.17 KG/M2 | HEIGHT: 69 IN | DIASTOLIC BLOOD PRESSURE: 80 MMHG | SYSTOLIC BLOOD PRESSURE: 114 MMHG

## 2019-05-10 DIAGNOSIS — S86.311A PERONEAL TENDON TEAR, RIGHT, INITIAL ENCOUNTER: Primary | ICD-10-CM

## 2019-05-10 DIAGNOSIS — Q66.71 PES CAVUS OF RIGHT FOOT: ICD-10-CM

## 2019-05-10 PROCEDURE — 99024 POSTOP FOLLOW-UP VISIT: CPT | Performed by: ORTHOPAEDIC SURGERY

## 2019-05-10 RX ORDER — ERGOCALCIFEROL (VITAMIN D2) 50 MCG
2000 CAPSULE ORAL DAILY
COMMUNITY

## 2019-05-15 ENCOUNTER — EVALUATION (OUTPATIENT)
Dept: PHYSICAL THERAPY | Facility: CLINIC | Age: 34
End: 2019-05-15
Payer: COMMERCIAL

## 2019-05-15 DIAGNOSIS — S86.311D STRAIN OF PERONEAL TENDON, RIGHT, SUBSEQUENT ENCOUNTER: Primary | ICD-10-CM

## 2019-05-15 PROCEDURE — 97140 MANUAL THERAPY 1/> REGIONS: CPT | Performed by: PHYSICAL THERAPIST

## 2019-05-15 PROCEDURE — 97110 THERAPEUTIC EXERCISES: CPT | Performed by: PHYSICAL THERAPIST

## 2019-05-15 PROCEDURE — 97162 PT EVAL MOD COMPLEX 30 MIN: CPT | Performed by: PHYSICAL THERAPIST

## 2019-05-22 ENCOUNTER — OFFICE VISIT (OUTPATIENT)
Dept: PHYSICAL THERAPY | Facility: CLINIC | Age: 34
End: 2019-05-22
Payer: COMMERCIAL

## 2019-05-22 DIAGNOSIS — S86.311D STRAIN OF PERONEAL TENDON, RIGHT, SUBSEQUENT ENCOUNTER: Primary | ICD-10-CM

## 2019-05-22 PROCEDURE — 97140 MANUAL THERAPY 1/> REGIONS: CPT | Performed by: PHYSICAL THERAPIST

## 2019-05-22 PROCEDURE — 97110 THERAPEUTIC EXERCISES: CPT | Performed by: PHYSICAL THERAPIST

## 2019-05-22 PROCEDURE — 97530 THERAPEUTIC ACTIVITIES: CPT | Performed by: PHYSICAL THERAPIST

## 2019-05-28 ENCOUNTER — OFFICE VISIT (OUTPATIENT)
Dept: PHYSICAL THERAPY | Facility: CLINIC | Age: 34
End: 2019-05-28
Payer: COMMERCIAL

## 2019-05-28 DIAGNOSIS — S86.311D STRAIN OF PERONEAL TENDON, RIGHT, SUBSEQUENT ENCOUNTER: Primary | ICD-10-CM

## 2019-05-28 PROCEDURE — 97110 THERAPEUTIC EXERCISES: CPT | Performed by: PHYSICAL THERAPIST

## 2019-05-28 PROCEDURE — 97530 THERAPEUTIC ACTIVITIES: CPT | Performed by: PHYSICAL THERAPIST

## 2019-05-28 PROCEDURE — 97140 MANUAL THERAPY 1/> REGIONS: CPT | Performed by: PHYSICAL THERAPIST

## 2019-05-31 ENCOUNTER — OFFICE VISIT (OUTPATIENT)
Dept: PHYSICAL THERAPY | Facility: CLINIC | Age: 34
End: 2019-05-31
Payer: COMMERCIAL

## 2019-05-31 DIAGNOSIS — S86.311D STRAIN OF PERONEAL TENDON, RIGHT, SUBSEQUENT ENCOUNTER: Primary | ICD-10-CM

## 2019-05-31 PROCEDURE — 97110 THERAPEUTIC EXERCISES: CPT | Performed by: PHYSICAL THERAPIST

## 2019-05-31 PROCEDURE — 97140 MANUAL THERAPY 1/> REGIONS: CPT | Performed by: PHYSICAL THERAPIST

## 2019-05-31 PROCEDURE — 97530 THERAPEUTIC ACTIVITIES: CPT | Performed by: PHYSICAL THERAPIST

## 2019-06-04 ENCOUNTER — APPOINTMENT (OUTPATIENT)
Dept: PHYSICAL THERAPY | Facility: CLINIC | Age: 34
End: 2019-06-04
Payer: COMMERCIAL

## 2019-06-06 ENCOUNTER — OFFICE VISIT (OUTPATIENT)
Dept: PHYSICAL THERAPY | Facility: CLINIC | Age: 34
End: 2019-06-06
Payer: COMMERCIAL

## 2019-06-06 DIAGNOSIS — S86.311D STRAIN OF PERONEAL TENDON, RIGHT, SUBSEQUENT ENCOUNTER: Primary | ICD-10-CM

## 2019-06-06 PROCEDURE — 97110 THERAPEUTIC EXERCISES: CPT | Performed by: PHYSICAL THERAPIST

## 2019-06-06 PROCEDURE — 97530 THERAPEUTIC ACTIVITIES: CPT | Performed by: PHYSICAL THERAPIST

## 2019-06-06 PROCEDURE — 97140 MANUAL THERAPY 1/> REGIONS: CPT | Performed by: PHYSICAL THERAPIST

## 2019-06-06 PROCEDURE — L3010 FOOT LONGITUDINAL ARCH SUPPO: HCPCS | Performed by: PHYSICAL THERAPIST

## 2019-06-11 ENCOUNTER — OFFICE VISIT (OUTPATIENT)
Dept: PHYSICAL THERAPY | Facility: CLINIC | Age: 34
End: 2019-06-11
Payer: COMMERCIAL

## 2019-06-11 DIAGNOSIS — S86.311D STRAIN OF PERONEAL TENDON, RIGHT, SUBSEQUENT ENCOUNTER: Primary | ICD-10-CM

## 2019-06-11 PROCEDURE — 97530 THERAPEUTIC ACTIVITIES: CPT | Performed by: PHYSICAL THERAPIST

## 2019-06-11 PROCEDURE — 97140 MANUAL THERAPY 1/> REGIONS: CPT | Performed by: PHYSICAL THERAPIST

## 2019-06-11 PROCEDURE — 97110 THERAPEUTIC EXERCISES: CPT | Performed by: PHYSICAL THERAPIST

## 2019-06-13 ENCOUNTER — APPOINTMENT (OUTPATIENT)
Dept: PHYSICAL THERAPY | Facility: CLINIC | Age: 34
End: 2019-06-13
Payer: COMMERCIAL

## 2019-06-18 ENCOUNTER — OFFICE VISIT (OUTPATIENT)
Dept: PHYSICAL THERAPY | Facility: CLINIC | Age: 34
End: 2019-06-18
Payer: COMMERCIAL

## 2019-06-18 DIAGNOSIS — S86.311D STRAIN OF PERONEAL TENDON, RIGHT, SUBSEQUENT ENCOUNTER: Primary | ICD-10-CM

## 2019-06-18 PROCEDURE — 97110 THERAPEUTIC EXERCISES: CPT | Performed by: PHYSICAL THERAPIST

## 2019-06-18 PROCEDURE — 97530 THERAPEUTIC ACTIVITIES: CPT | Performed by: PHYSICAL THERAPIST

## 2019-06-18 PROCEDURE — 97140 MANUAL THERAPY 1/> REGIONS: CPT | Performed by: PHYSICAL THERAPIST

## 2019-06-20 ENCOUNTER — OFFICE VISIT (OUTPATIENT)
Dept: PHYSICAL THERAPY | Facility: CLINIC | Age: 34
End: 2019-06-20
Payer: COMMERCIAL

## 2019-06-20 DIAGNOSIS — S86.311D STRAIN OF PERONEAL TENDON, RIGHT, SUBSEQUENT ENCOUNTER: Primary | ICD-10-CM

## 2019-06-20 PROCEDURE — 97530 THERAPEUTIC ACTIVITIES: CPT | Performed by: PHYSICAL THERAPIST

## 2019-06-20 PROCEDURE — 97110 THERAPEUTIC EXERCISES: CPT | Performed by: PHYSICAL THERAPIST

## 2019-06-20 PROCEDURE — 97140 MANUAL THERAPY 1/> REGIONS: CPT | Performed by: PHYSICAL THERAPIST

## 2019-06-21 ENCOUNTER — OFFICE VISIT (OUTPATIENT)
Dept: OBGYN CLINIC | Facility: CLINIC | Age: 34
End: 2019-06-21

## 2019-06-21 VITALS
SYSTOLIC BLOOD PRESSURE: 125 MMHG | BODY MASS INDEX: 40.09 KG/M2 | HEART RATE: 62 BPM | HEIGHT: 70 IN | WEIGHT: 280 LBS | DIASTOLIC BLOOD PRESSURE: 83 MMHG

## 2019-06-21 DIAGNOSIS — S86.311A PERONEAL TENDON TEAR, RIGHT, INITIAL ENCOUNTER: Primary | ICD-10-CM

## 2019-06-21 PROCEDURE — 99024 POSTOP FOLLOW-UP VISIT: CPT | Performed by: ORTHOPAEDIC SURGERY

## 2019-06-24 ENCOUNTER — OFFICE VISIT (OUTPATIENT)
Dept: PHYSICAL THERAPY | Facility: CLINIC | Age: 34
End: 2019-06-24
Payer: COMMERCIAL

## 2019-06-24 DIAGNOSIS — S86.311D STRAIN OF PERONEAL TENDON, RIGHT, SUBSEQUENT ENCOUNTER: Primary | ICD-10-CM

## 2019-06-24 PROCEDURE — 97140 MANUAL THERAPY 1/> REGIONS: CPT | Performed by: PHYSICAL THERAPIST

## 2019-06-24 PROCEDURE — 97110 THERAPEUTIC EXERCISES: CPT | Performed by: PHYSICAL THERAPIST

## 2019-06-24 PROCEDURE — 97112 NEUROMUSCULAR REEDUCATION: CPT | Performed by: PHYSICAL THERAPIST

## 2019-06-25 ENCOUNTER — APPOINTMENT (OUTPATIENT)
Dept: PHYSICAL THERAPY | Facility: CLINIC | Age: 34
End: 2019-06-25
Payer: COMMERCIAL

## 2019-06-27 ENCOUNTER — APPOINTMENT (OUTPATIENT)
Dept: PHYSICAL THERAPY | Facility: CLINIC | Age: 34
End: 2019-06-27
Payer: COMMERCIAL

## 2019-07-03 ENCOUNTER — OFFICE VISIT (OUTPATIENT)
Dept: PHYSICAL THERAPY | Facility: CLINIC | Age: 34
End: 2019-07-03
Payer: COMMERCIAL

## 2019-07-03 DIAGNOSIS — S86.311D STRAIN OF PERONEAL TENDON, RIGHT, SUBSEQUENT ENCOUNTER: Primary | ICD-10-CM

## 2019-07-03 PROCEDURE — 97112 NEUROMUSCULAR REEDUCATION: CPT | Performed by: PHYSICAL THERAPIST

## 2019-07-03 PROCEDURE — 97110 THERAPEUTIC EXERCISES: CPT | Performed by: PHYSICAL THERAPIST

## 2019-07-03 PROCEDURE — 97140 MANUAL THERAPY 1/> REGIONS: CPT | Performed by: PHYSICAL THERAPIST

## 2019-07-03 NOTE — PROGRESS NOTES
Daily Note     Today's date: 7/3/2019  Patient name: Stefanie Aviles  : 1985  MRN: [id number]  Referring provider: Oscar Stoddard MD  Dx:   Encounter Diagnosis     ICD-10-CM    1  Strain of peroneal tendon, right, subsequent encounter S86 311D                   Subjective: "I don't have any pain but it's been swelling up "      Objective: See treatment diary below    Manuals 5/14 5/22 5/28 5/31 6/6 6/11 6/18 6/20 6/24 7/3   PROM 4' 4' 4' 4' 4' 4' 4'' 4' 4' 4'   jt mobs 4' 4' 4' 4' 2' 4' 4' 4' 4' 4'   Calf roll     4'        casting  x           Exercise Diary              AROM IP 30 ea 30 ea 30 ea  30 30   DC   Calf stretch with towel IP review x  standing 30"x3 Stand 30"x2 30"x2 30"x2 hep    bike   10 10 10 10 10 10 10' 10'   tband Pf    G x30  cachorro x30  cachorro x30 cachorro x30    tband inv/ev     g x30 cachorro 30 cachorro x30 cachorro x30 cachorro x30    Wobble board: a/p, m/l, cw, ccw     seated 30 ea Stand 30 [id number] 30 30x 30x ea   Dispensed orthotics     x        SLB        3' 3' 3'   biodex catch        4' 4' 4'   Leg press -SL        1pl 3x10 1pl 3x10 2pl 3x10   Step ups (f/l)          6"x15 ea   BOSU step ups          x15   Heel/toe walking          x1 lap                                                       Modalities                                                 Assessment: Pt had good tolerance to progression of program  Most difficulty with toe walking  Discussed adding SL heel raises to HEP  Plan: Continue per plan of care

## 2019-07-09 ENCOUNTER — APPOINTMENT (OUTPATIENT)
Dept: PHYSICAL THERAPY | Facility: CLINIC | Age: 34
End: 2019-07-09
Payer: COMMERCIAL

## 2019-07-11 ENCOUNTER — OFFICE VISIT (OUTPATIENT)
Dept: PHYSICAL THERAPY | Facility: CLINIC | Age: 34
End: 2019-07-11
Payer: COMMERCIAL

## 2019-07-11 DIAGNOSIS — S86.311D STRAIN OF PERONEAL TENDON, RIGHT, SUBSEQUENT ENCOUNTER: Primary | ICD-10-CM

## 2019-07-11 PROCEDURE — 97112 NEUROMUSCULAR REEDUCATION: CPT

## 2019-07-11 PROCEDURE — 97110 THERAPEUTIC EXERCISES: CPT

## 2019-07-11 PROCEDURE — 97140 MANUAL THERAPY 1/> REGIONS: CPT

## 2019-07-11 NOTE — PROGRESS NOTES
Daily Note     Today's date: 2019  Patient name: Akhil Diop  : 1985  MRN: [id number]  Referring provider: Kim Carl MD  Dx:   Encounter Diagnosis     ICD-10-CM    1  Strain of peroneal tendon, right, subsequent encounter S86 311D                   Subjective: "I don't have any pain but it keeps swelling up " Pt did add SL TR to HEP, as discussed LV  Objective: See treatment diary below    Manuals 7/11        6/24 7/3   PROM 4'         4'   jt mobs 4'         4'   Exercise Diary              bike 10'         10'   tband Pf HEP        cachorro x30    tband inv/ev HEP        cachorro x30    Wobble board: a/p, m/l, cw, ccw 30xea        30x 30x ea   SLB 3'         3'   biodex catch 4'         4'   Leg press -SL 2pl  3x10         2pl 3x10   Step ups (f/l) 6"x15  ea         6"x15 ea   BOSU step ups x15         x15   Heel/toe walking Hx1lap  T2laps         x1 lap                                                       Modalities                                                 Assessment: Mild discomfort at times during BOSU step ups  Toe walking remains challenging  Performed remaining exercises w/o complaint  Comfortable during PROM to R ankle/foot  MB, PT performed jt mobs to same  Will monitor  Plan: Continue per plan of care

## 2019-07-18 ENCOUNTER — EVALUATION (OUTPATIENT)
Dept: PHYSICAL THERAPY | Facility: CLINIC | Age: 34
End: 2019-07-18
Payer: COMMERCIAL

## 2019-07-18 DIAGNOSIS — S86.311D STRAIN OF PERONEAL TENDON, RIGHT, SUBSEQUENT ENCOUNTER: Primary | ICD-10-CM

## 2019-07-18 PROCEDURE — 97112 NEUROMUSCULAR REEDUCATION: CPT | Performed by: PHYSICAL THERAPIST

## 2019-07-18 PROCEDURE — 97530 THERAPEUTIC ACTIVITIES: CPT | Performed by: PHYSICAL THERAPIST

## 2019-07-18 PROCEDURE — 97110 THERAPEUTIC EXERCISES: CPT | Performed by: PHYSICAL THERAPIST

## 2019-07-18 NOTE — PROGRESS NOTES
Daily Note/Re-evaluation    Today's date: 2019  Patient name: Irma Orr  : 1985  MRN: [id number]  Referring provider: Hyun Klein MD  Dx:   Encounter Diagnosis     ICD-10-CM    1  Strain of peroneal tendon, right, subsequent encounter S86 311D                         Assessment  Pt has made improvements in both R LE strength and ROM  Pt is now ambulating independently  Pt continues to have deficits in PF and eversion and struggles with negotiating uneven surfaces and ascending/descending stairs  Pt would benefit from continued skilled PT to further address impairments and to return to PLOF      Goals  Impairment Goals  - Decrease pain -ongoing  - Improve ROM to [facility] met  - Increase strength -ongoing    Functional Goals  - Return to Prior Level of Function-ongoing  - Increase Functional Status Measure- ongoing  - Patient will be independent with HEP- ongoing    Plan  Patient would benefit from: skilled PT  Planned therapy interventions: joint mobilization, manual therapy, patient education,  flexibility, functional ROM exercises, graded exercise, home exercise program, neuromuscular re-education, strengthening, stretching, therapeutic activities and therapeutic exercise  Frequency: 2x week  Duration in weeks: 3  Treatment plan discussed with: patient        Subjective Evaluation    Pain  Current pain ratin  At best pain ratin  At worst pain rating: 3      Patient Goals  Patient goals for therapy: return to sport/leisure activities          Objective    AROM R:  Inv WNL  Ev 15 deg  DF 10 deg      Strength:  DF 5/5  PF 3+/5  Ev 3+/5  In 5/5    R SLS on airex: 3"      Objective: See treatment diary below  Manuals  7/3   PROM 4' DC        4'   jt mobs 4' 4'        4'   Exercise Diary              bike 10' 8'        10'   Wobble board: a/p, m/l, cw, ccw 30xea 30xea       30x 30x ea   SLB 3' 2x30" on airex        3'   Leg press -SL 2pl  3x10 2pl 2x15        2pl 3x10   Step ups (f/l) 6"x15  ea 10"x15 ea        6"x15 ea   BOSU step ups x15 x15        x15   Heel/toe walking Hx1lap  T2laps x2 laps ea        x1 lap   Eversion isometrics             Toe taps on small cones             Step overs  6"x15           Eccentric leg lowering   6"x15           Modalities

## 2019-07-22 ENCOUNTER — OFFICE VISIT (OUTPATIENT)
Dept: PHYSICAL THERAPY | Facility: CLINIC | Age: 34
End: 2019-07-22
Payer: COMMERCIAL

## 2019-07-22 DIAGNOSIS — S86.311D STRAIN OF PERONEAL TENDON, RIGHT, SUBSEQUENT ENCOUNTER: Primary | ICD-10-CM

## 2019-07-22 PROCEDURE — 97112 NEUROMUSCULAR REEDUCATION: CPT

## 2019-07-22 PROCEDURE — 97110 THERAPEUTIC EXERCISES: CPT

## 2019-07-22 NOTE — PROGRESS NOTES
Daily Note     Today's date: 2019  Patient name: Tania Larsen  : 1985  MRN: [id number]  Referring provider: Maverick Ortega MD  Dx:   Encounter Diagnosis     ICD-10-CM    1  Strain of peroneal tendon, right, subsequent encounter S86 449D                   Subjective: Pt reports his R ankle/foot has been feeling good  Noted some discomfort when swimming over the weekend  Objective: See treatment diary below      Assessment: Performed new exercises and ex progressions w/o complaint  Required vcing during wobble board exercises to perform them slowly  MD, PT performed R ankle jt mobs  Will monitor   Patient would benefit from continued PT      Plan: Continue per plan of care          Objective: See treatment diary below  Manuals 7/11 7/18  7/22           6/24 7/3   PROM 4' DC  ----             4'   jt mobs 4' 4'               4'   Exercise Diary                        bike 8' 8'  8'             10'   Wobble board: a/p, m/l, cw, ccw 30xea 30xea  30x  ea           30x 30x ea   SLB 3' 2x30" on airex  2x30"on  airex             3'   Leg press -SL 2pl  3x10 2pl 2x15               2pl 3x10   Step ups (f/l) 6"x15  ea 10"x15 ea  10"  x15  ea             6"x15 ea   BOSU step ups x15 x15  x20             x15   Heel/toe walking Hx1lap  T2laps x2 laps ea  x2  Laps  ea             x1 lap   Eversion isometrics      5"x  10                 Toe taps on small cones      x15                 Step overs   6"x15  6"x  15                 Eccentric leg lowering    6"x15  6"x15                 Modalities

## 2019-07-25 ENCOUNTER — OFFICE VISIT (OUTPATIENT)
Dept: PHYSICAL THERAPY | Facility: CLINIC | Age: 34
End: 2019-07-25
Payer: COMMERCIAL

## 2019-07-25 DIAGNOSIS — S86.311D STRAIN OF PERONEAL TENDON, RIGHT, SUBSEQUENT ENCOUNTER: Primary | ICD-10-CM

## 2019-07-25 PROCEDURE — 97112 NEUROMUSCULAR REEDUCATION: CPT

## 2019-07-25 PROCEDURE — 97110 THERAPEUTIC EXERCISES: CPT

## 2019-07-25 NOTE — PROGRESS NOTES
Daily Note     Today's date: 2019  Patient name: Zackary Blank  : 1985  MRN: [id number]  Referring provider: Jennifer Herrera MD  Dx:   Encounter Diagnosis     ICD-10-CM    1  Strain of peroneal tendon, right, subsequent encounter S87 515D                   Subjective: Pt reports he has no pain R ankle/foot  Objective: See treatment diary below      Assessment: Performed exercise program w/o difficulty or discomfort  KT, PT performed R ankle jt mobs  Will monitor   Patient would benefit from continued PT      Plan: Continue per plan of care          Objective: See treatment diary below  Manuals 7/11 7/18  7/22  7/25         6/24 7/3   PROM 4' DC  ----  ----           4'   jt mobs 4' 4'    4'KT           4'   Exercise Diary                        bike 8' 8'  8'  8'           10'   Wobble board: a/p, m/l, cw, ccw 30xea 30xea  30x  ea  30x  ea         30x 30x ea   SLB 3' 2x30" on airex  2x30"on  airex  2x  30"  airex           3'   Leg press -SL 2pl  3x10 2pl 2x15    2pl  2x15           2pl 3x10   Step ups (f/l) 6"x15  ea 10"x15 ea  10"  x15  ea  10"  x15 ea           6"x15 ea   BOSU step ups x15 x15  x20  x20           x15   Heel/toe walking Hx1lap  T2laps x2 laps ea  x2  Laps  ea  2laps  ea           x1 lap   Eversion isometrics      5"x  10  5"x10               Toe taps on small cones      x15  x15               Step overs   6"x15  6"x  15  6"x15               Eccentric leg lowering    6"x15  6"x15  6"x20               Modalities

## 2019-07-29 ENCOUNTER — APPOINTMENT (OUTPATIENT)
Dept: PHYSICAL THERAPY | Facility: CLINIC | Age: 34
End: 2019-07-29
Payer: COMMERCIAL

## 2019-07-31 ENCOUNTER — OFFICE VISIT (OUTPATIENT)
Dept: PHYSICAL THERAPY | Facility: CLINIC | Age: 34
End: 2019-07-31
Payer: COMMERCIAL

## 2019-07-31 DIAGNOSIS — S86.311D STRAIN OF PERONEAL TENDON, RIGHT, SUBSEQUENT ENCOUNTER: Primary | ICD-10-CM

## 2019-07-31 PROCEDURE — 97112 NEUROMUSCULAR REEDUCATION: CPT | Performed by: PHYSICAL THERAPIST

## 2019-07-31 PROCEDURE — 97110 THERAPEUTIC EXERCISES: CPT | Performed by: PHYSICAL THERAPIST

## 2019-07-31 NOTE — PROGRESS NOTES
Daily Note     Today's date: 2019  Patient name: Stefanie Aviles  : 1985  MRN: [id number]  Referring provider: Oscar Stoddard MD  Dx:   Encounter Diagnosis     ICD-10-CM    1  Strain of peroneal tendon, right, subsequent encounter S86 675D                   Subjective: Pt reports no new changes  Objective: See treatment diary below      Assessment: Improvements in BOSU step ups and toe walking noted  Will continue to monitor        Plan: Continue per plan of care          Objective: See treatment diary below  Manuals 7/11 7/18  7/22  7/25  7/31       6/24 7/3   PROM 4' DC  ----  ----          4'   jt mobs 4' 4'    4'KT DC          4'   Exercise Diary                        bike 8' 8'  8'  8'  8'         10'   Wobble board: a/p, m/l, cw, ccw 30xea 30xea  30x  ea  30x  ea  20x ea       30x 30x ea   SLB 3' 2x30" on airex  2x30"on  airex  2x  30"  airex  2x30" airex         3'   Leg press -SL 2pl  3x10 2pl 2x15    2pl  2x15  2 pl 2x15         2pl 3x10   Step ups (f/l) 6"x15  ea 10"x15 ea  10"  x15  ea  10"  x15 ea  10"x20         6"x15 ea   BOSU step ups x15 x15  x20  x20  x20         x15   Heel/toe walking Hx1lap  T2laps x2 laps ea  x2  Laps  ea  2laps  ea  2laps ea         x1 lap   Eversion isometrics      5"x  10  5"x10  5"x15             Toe taps on small cones      x15  x15               Step overs   6"x15  6"x  15  6"x15  6"x20             Eccentric leg lowering    6"x15  6"x15  6"x20  6"x20             Modalities

## 2019-08-08 ENCOUNTER — APPOINTMENT (OUTPATIENT)
Dept: PHYSICAL THERAPY | Facility: CLINIC | Age: 34
End: 2019-08-08
Payer: COMMERCIAL

## 2019-08-14 ENCOUNTER — OFFICE VISIT (OUTPATIENT)
Dept: PHYSICAL THERAPY | Facility: CLINIC | Age: 34
End: 2019-08-14
Payer: COMMERCIAL

## 2019-08-14 DIAGNOSIS — S86.311D STRAIN OF PERONEAL TENDON, RIGHT, SUBSEQUENT ENCOUNTER: Primary | ICD-10-CM

## 2019-08-14 PROCEDURE — 97110 THERAPEUTIC EXERCISES: CPT | Performed by: PHYSICAL THERAPIST

## 2019-08-14 PROCEDURE — 97112 NEUROMUSCULAR REEDUCATION: CPT | Performed by: PHYSICAL THERAPIST

## 2019-08-14 NOTE — PROGRESS NOTES
Daily Note     Today's date: 2019  Patient name: Jomar Lemus  : 1985  MRN: [id number]  Referring provider: Shayne Ordonez MD  Dx:   Encounter Diagnosis     ICD-10-CM    1  Strain of peroneal tendon, right, subsequent encounter [id number] [id number]                   Subjective: "I've been doing well "      Objective: See treatment diary below      Assessment: Pt has good tolerance to all activities  Able to demonstrate an adequate heel raise at this time         Plan: No further skilled PT required at this time         Objective: See treatment diary below  Manuals 7/11 7/18  7/22  7/25  7/31  8/14     6/24 7/3   PROM 4' DC  ----  ----          4'   jt mobs 4' 4'    4'KT DC          4'   Exercise Diary                        bike 8' 8'  8'  8'  8'  8'       10'   Wobble board: a/p, m/l, cw, ccw 30xea 30xea  30x  ea  30x  ea  20x ea  20xea     30x 30x ea   SLB 3' 2x30" on airex  2x30"on  airex  2x  30"  airex  2x30" airex  2x30" airex       3'   Leg press -SL 2pl  3x10 2pl 2x15    2pl  2x15  2 pl 2x15  2pl 2x20       2pl 3x10   Step ups (f/l) 6"x15  ea 10"x15 ea  10"  x15  ea  10"  x15 ea  10"x20  10"x20 ea       6"x15 ea   BOSU step ups x15 x15  x20  x20  x20  x20       x15   Heel/toe walking Hx1lap  T2laps x2 laps ea  x2  Laps  ea  2laps  ea  2laps ea  2laps ea       x1 lap   Eversion isometrics      5"x  10  5"x10  5"x15  5"x15           Toe taps on small cones      x15  x15    4 cones x5 ea           Step overs   6"x15  6"x  15  6"x15  6"x20  6"x20           Eccentric leg lowering    6"x15  6"x15  6"x20  6"x20  6"x20           Modalities

## 2019-09-03 ENCOUNTER — OFFICE VISIT (OUTPATIENT)
Dept: OBGYN CLINIC | Facility: CLINIC | Age: 34
End: 2019-09-03
Payer: COMMERCIAL

## 2019-09-03 VITALS
BODY MASS INDEX: 41.52 KG/M2 | SYSTOLIC BLOOD PRESSURE: 116 MMHG | HEIGHT: 70 IN | DIASTOLIC BLOOD PRESSURE: 81 MMHG | HEART RATE: 70 BPM | WEIGHT: 290 LBS

## 2019-09-03 DIAGNOSIS — S86.311A PERONEAL TENDON TEAR, RIGHT, INITIAL ENCOUNTER: Primary | ICD-10-CM

## 2019-09-03 PROCEDURE — 99213 OFFICE O/P EST LOW 20 MIN: CPT | Performed by: ORTHOPAEDIC SURGERY

## 2019-09-03 NOTE — PROGRESS NOTES
TRU Coley  Attending, Orthopaedic Surgery  Foot and [address] [address] [facility]      ORTHOPAEDIC FOOT AND ANKLE CLINIC VISIT     Assessment:     Encounter Diagnosis   Name Primary?  Peroneal tendon tear, right, initial encounter Yes   6 months s/p peroneus brevis tenodesis         Plan:   · The patient verbalized understanding of exam findings and treatment plan  We engaged in the shared decision-making process and treatment options were discussed at length with the patient  Surgical and conservative management discussed today along with risks and benefits  · He has returned to all of his activities without limitations  · He has minimal residual discomfort  · Continue orthotics and replace every 2-3 years  · Follow-up PRN      History of Present Illness:   Chief Complaint:   Chief Complaint   Patient presents with    Right Ankle - Follow-up     Gasper Stapleton is a 37 y o  male who is being seen in follow-up for Right peroneus brevis tenodesis  When we last saw he we recommended PT  Pain has completely improved  No residual pain     Pain/symptom timing:  Worse during the day when active  Pain/symptom context:  Worse with activites and work  Pain/symptom modifying factors:  Rest makes better, activities make worse  Pain/symptom associated signs/symptoms: none    Prior treatment   · NSAIDsYes   · Injections No   · Bracing/Orthotics Yes    · Physical Therapy Yes     Orthopedic Surgical History:   See below    Past Medical, Surgical and Social History:  Past Medical History:  has a past medical history of Allergy to environmental factors, Asthma, Bariatric surgery status, Full dentures, Headache, Migraine, Morbid obesity ([address] ), Postsurgical malabsorption, Rash, and Wears glasses  Problem List: does not have any pertinent problems on file  Past Surgical History:  has a past surgical history that includes Dental surgery;  Tonsillectomy; pr egd transoral biopsy single/multiple (N/A, 5/3/2017); pr lap, janice restrict proc, longitudinal gastrectomy (N/A, 6/20/2017); Esophagogastroduodenoscopy (N/A, 6/20/2017); Gastric bypass (06/2017); and pr repair/graft flex foot tendon (Right, 3/27/2019)  Family History: family history includes Diabetes in his father; Heart disease in his father; No Known Problems in his mother; Obesity in his father  Social History:  reports that he quit smoking about 2 years ago  His smoking use included cigarettes and cigars  He quit after 10 00 years of use  He has never used smokeless tobacco  He reports that he drinks alcohol  He reports that he has current or past drug history  Drug: Marijuana  Current Medications: has a current medication list which includes the following prescription(s): enoxaparin, multivitamin, and vitamin d (ergocalciferol)  Allergies: is allergic to nsaids  Review of Systems:  General- denies fever/chills  HEENT- denies hearing loss or sore throat  Eyes- denies eye pain or visual disturbances, denies red eyes  Respiratory- denies cough or SOB  Cardio- denies chest pain or palpitations  GI- denies abdominal pain  Endocrine- denies urinary frequency  Urinary- denies pain with urination  Musculoskeletal- Negative except noted above  Skin- denies rashes or wounds  Neurological- denies dizziness or headache  Psychiatric- denies anxiety or difficulty concentrating    Physical Exam:   /81 (BP Location: Left arm, Patient Position: Sitting, Cuff Size: Adult)   Pulse 70   Ht 5' 10" (1 778 m)   Wt 132 kg (290 lb)   BMI 41 61 kg/m²   General/Constitutional: No apparent distress: well-nourished and well developed    Eyes: normal ocular motion  Lymphatic: No appreciable lymphadenopathy  Respiratory: Non-labored breathing  Vascular: No edema, swelling or tenderness, except as noted in detailed exam   Integumentary: No impressive skin lesions present, except as noted in detailed exam   Neuro: No ataxia or tremors noted  Psych: Normal mood and affect, oriented to person, place and time  Appropriate affect  Musculoskeletal: Normal, except as noted in detailed exam and in HPI  Examination    Right    Gait Normal   Musculoskeletal No TTP    Skin Normal   Well-healed incisions  Nails Normal    Range of Motion  20 degrees dorsiflexion, 40 degrees plantarflexion  Subtalar motion: normal    Stability Stable    Muscle Strength 5/5 tibialis anterior  5/5 gastrocnemius-soleus  5/5 posterior tibialis  5/5 peroneal/eversion strength  5/5 EHL  5/5 FHL    Neurologic Normal    Sensation Intact to light touch throughout sural, saphenous, superficial peroneal, deep peroneal and medial/lateral plantar nerve distributions  [place]-Any 5 07 filament (10g) testing deferred  Cardiovascular Brisk capillary refill < 2 seconds,intact DP and PT pulses    Special Tests None      Imaging Studies:   No new imaging        James R Lachman, MD  Foot & Ankle Surgery   Department of [address] [place] [address]      I personally performed the service  Daphney Ready Lachman, MD

## 2020-06-29 PROBLEM — Z48.815 ENCOUNTER FOR SURGICAL AFTERCARE FOLLOWING SURGERY OF DIGESTIVE SYSTEM: Status: ACTIVE | Noted: 2020-06-29

## 2020-06-29 PROBLEM — E66.9 OBESITY (BMI 30-39.9): Status: RESOLVED | Noted: 2017-09-29 | Resolved: 2020-06-29

## 2020-06-29 PROBLEM — E66.812 OBESITY, CLASS II, BMI 35-39.9: Status: RESOLVED | Noted: 2019-05-07 | Resolved: 2020-06-29

## 2020-06-29 PROBLEM — E66.9 OBESITY, CLASS II, BMI 35-39.9: Status: RESOLVED | Noted: 2019-05-07 | Resolved: 2020-06-29

## 2020-06-30 ENCOUNTER — OFFICE VISIT (OUTPATIENT)
Dept: BARIATRICS | Facility: CLINIC | Age: 35
End: 2020-06-30
Payer: COMMERCIAL

## 2020-06-30 VITALS
SYSTOLIC BLOOD PRESSURE: 126 MMHG | WEIGHT: 296.5 LBS | BODY MASS INDEX: 42.45 KG/M2 | DIASTOLIC BLOOD PRESSURE: 80 MMHG | TEMPERATURE: 97.7 F | HEART RATE: 84 BPM | HEIGHT: 70 IN

## 2020-06-30 DIAGNOSIS — K91.2 POSTSURGICAL MALABSORPTION: ICD-10-CM

## 2020-06-30 DIAGNOSIS — E66.01 MORBID (SEVERE) OBESITY DUE TO EXCESS CALORIES (HCC): Primary | ICD-10-CM

## 2020-06-30 DIAGNOSIS — Z48.815 ENCOUNTER FOR SURGICAL AFTERCARE FOLLOWING SURGERY OF DIGESTIVE SYSTEM: ICD-10-CM

## 2020-06-30 DIAGNOSIS — E55.9 VITAMIN D INSUFFICIENCY: ICD-10-CM

## 2020-06-30 DIAGNOSIS — Z98.84 BARIATRIC SURGERY STATUS: ICD-10-CM

## 2020-06-30 PROCEDURE — 99214 OFFICE O/P EST MOD 30 MIN: CPT | Performed by: PHYSICIAN ASSISTANT

## 2020-06-30 RX ORDER — TOPIRAMATE 25 MG/1
25 TABLET ORAL DAILY
Qty: 30 TABLET | Refills: 2 | Status: SHIPPED | OUTPATIENT
Start: 2020-06-30 | End: 2020-09-12 | Stop reason: DRUGHIGH

## 2020-07-09 NOTE — PROGRESS NOTES
Bariatric  Nutrition Note      Type of surgery  Vertical sleeve gastrectomy  Surgery Date: 6/20/2017  3 years  post-op  Surgeon: Dr Ania Leonardo  38 y o   male  Temp 97 7 °F (36 5 °C)   Ht 5' 10" (1 778 m)   Wt 134 kg (296 lb 6 4 oz)   BMI 42 53 kg/m²     [place]to[facility] Equation:   2276 kcal per day  Estimated calories for weight loss 3808-4069  ( 1-2# per wk wt loss - sedentary )  Estimated protein needs 80-95 grams (1 0-1 2 gms/kg IBW )   Estimated fluid needs 2748 ml (-35 ml/kg IBW ) 92 ounces per day     Wt Readings from Last 3 Encounters:   06/30/20 134 kg (296 lb 8 oz)   09/03/19 132 kg (290 lb)   06/21/19 127 kg (280 lb)       Weight on Day of Weight Loss Surgery: 304 3#  Weight in (lb) to have BMI = 25: 172 7#  Pre-Op Excess Wt: 131 6#  Post-Op Wt Loss: 37 9#/ 23% EBWL in 3 year(s)  Hussein weight = 250#    Review of History and Medications   Past Medical History:   Diagnosis Date    Allergy to environmental factors     As a child patient developed a rash from bleach    Asthma     States weight induced and no medications-no current issues since bypass surgery    Bariatric surgery status     Full dentures     Edentulous- only wears top denture    Headache     Migraine     Morbid obesity ([address] )     Postsurgical malabsorption     Rash     Has rash that now has scabs from itching  States it was biopsied and was told it was an allergic reaction possibly to detergent    Wears glasses      Past Surgical History:   Procedure Laterality Date    DENTAL SURGERY      ESOPHAGOGASTRODUODENOSCOPY N/A 6/20/2017    Procedure: ESOPHAGOGASTRODUODENOSCOPY (EGD); Surgeon: Atul Guevara MD;  Location: [facility];  Service: Bariatrics    GASTRIC BYPASS  06/2017    gastric sleeve    DC EGD TRANSORAL BIOPSY SINGLE/MULTIPLE N/A 5/3/2017    Procedure: ESOPHAGOGASTRODUODENOSCOPY (EGD) with bx;  Surgeon: Atul Guevara MD;  Location: [facility];   Service: Errol AL LAP, ROSALES RESTRICT PROC, LONGITUDINAL GASTRECTOMY N/A 6/20/2017    Procedure: GASTRECTOMY SLEEVE LAPAROSCOPIC;  Surgeon: Yusra Hardy MD;  Location[facility];  Service: Joshua Eber REPAIR/GRAFT FLEX FOOT TENDON Right 3/27/2019    Procedure: REPAIR TENDON FOOT, Peroneal tendon exploration with possible repair versus tenodesis;   Surgeon: Mallorie Moise MD;  [facility];  Service: Orthopedics    TONSILLECTOMY       Social History     Socioeconomic History    Marital status: Single     Spouse name: Not on file    Number of children: Not on file    Years of education: Not on file    Highest education level: Not on file   Occupational History    Not on file   Social Needs    Financial resource strain: Not on file    Food insecurity:     Worry: Not on file     Inability: Not on file    Transportation needs:     Medical: Not on file     Non-medical: Not on file   Tobacco Use    Smoking status: Former Smoker     Years: 10 00     Types: Cigarettes, Cigars     Last attempt to quit: 12/20/2016     Years since quitting: 3 5    Smokeless tobacco: Never Used   Substance and Sexual Activity    Alcohol use: Yes     Frequency: 2-4 times a month     Drinks per session: 1 or 2     Comment: SOCIAL    Drug use: Yes     Types: Marijuana     Comment: social    Sexual activity: Not on file   Lifestyle    Physical activity:     Days per week: Not on file     Minutes per session: Not on file    Stress: Not on file   Relationships    Social connections:     Talks on phone: Not on file     Gets together: Not on file     Attends [employer] service: Not on file     Active member of club or organization: Not on file     Attends meetings of clubs or organizations: Not on file     Relationship status: Not on file    Intimate partner violence:     Fear of current or ex partner: Not on file     Emotionally abused: Not on file     Physically abused: Not on file     Forced sexual activity: Not on file Other Topics Concern    Not on file   Social History Narrative    Not on file       Current Outpatient Medications:     multivitamin (THERAGRAN) TABS, Take 1 tablet by mouth daily, Disp: , Rfl:     topiramate (TOPAMAX) 25 mg tablet, Take 1 tablet (25 mg total) by mouth daily, Disp: 30 tablet, Rfl: 2    Vitamin D, Ergocalciferol, 2000 units CAPS, Take 2,000 Units by mouth daily, Disp: , Rfl:     Food Intake and Lifestyle Assessment   Food Intake Assessment completed via usual diet recall  5:30 am then breakfast at 6:30 am   Breakfast: 6: 30 am  oatmeal with raisons made with milk   Snack: 0   Lunch: 12: 30 just about anything - sandwich - meat and cheese on bread ( 647) OR  Sometimes beef stick ( 2-3) or cereal ( with milk ) or protein shake ( Premier )   Snack: fruit or potato chips or tortilla chips or pudding or yogurt or jello   Dinner: 7 or 8 pm :  Used to go out to dinner or take out   Since March - chicken , beef, rice rarely potatoes, vegetables   Snack: not usually -   Beverage intake: [place] diet tea - 1/4 gallon of tea, occasionally with ronit 16 ounce of coffee per day   Diet texture/stage: regular  Protein supplement: sometimes two to three times per week   Estimated protein intake per day: 70-80 grams   Estimated fluid intake per day: 48-55 ounces per day  - 16 ounces of coffee & 32 ounces of diet tea & 8 ounces water   Meals eaten away from home: rarely since March ( Covid-19 and moved in with "healthy" room mate   Typical meal pattern: 3 meals per day and 1-2 snacks per day  Eating Behaviors: Appropriate diet advancement, Large portion sizes, Frequent snacking/ grazing and Craves salty foods  Does not follow 30/60 rule     Food allergies or intolerances: none noted   Cultural or Nondenominational considerations: n/a     Physical Assessment  Nutrition Related Findings  Return of Hunger and reports can tolerate larger portions - up to 2 cups of food per meal without discomfort     Physical Activity  Types of exercise: Walking  Hiking   Current physical limitations: ankle pain     Psychosocial Assessment   Support systems: room mate - "health nut"   Socioeconomic factors: works full time from home     Nutrition Diagnosis  Diagnosis: Overweight / Obesity (NC-3 3), Excessive energy intake (NI-1 5) and Inappropriate intake of carbohydrates (NI-5 8  3)  Related to: Physical inactivity and Excessive energy intake  As Evidenced by: BMI >25, Expected anthropometric outcomes are not achieved and Unintentional weight gain     Interventions and Teaching   Patient educated on post-op nutrition guidelines  Patient educated and handouts provided    Surgical changes to stomach / GI  Capacity of post-surgery stomach  Adequate hydration  Expected weight loss  Weight loss plateaus/ possibility of weight regain  Exercise  Nutrition considerations after surgery  Protein supplements  Meal planning and preparation  Appropriate carbohydrate, protein, and fat intake, and food/fluid choices to maximize safe weight loss, nutrient intake, and tolerance   Dietary and lifestyle changes  Possible problems with poor eating habits  Intuitive eating  Techniques for self monitoring and keeping daily food journal  Vitamin / Mineral supplementation of Multivitamin with minerals, Calcium, Vitamin B12, Iron, Fat Soluble vitamins and Vitamin D  Reviewed post op vitamin and mineral recommendations - patient having some difficulties due to bottom dentures - provided with Bariatric Fusion soft chew multivitamin/mineral samples, bariatric fusion soft chew calcium samples, and Upcal D samples, also suggested [facility] liquid calcium   As patient had the sleeve and does include dairy products in his diet, can take 2 calcium supplements with vitamin D per day to meet his calcium needs  Patient takes 5000 IU of vitamin D3 as a maintenance dose to keep level normal      Discussed importance of support- patient was attending pep dorita meetings at [facility] but meetings were stopped due to Covid-19  Aleksandrfelicia is providing zoom pep rally meetings that he can participate in       Education provided to: patient    Barriers to learning: No barriers identified    Readiness to change: preparation    Comprehension: verbalizes understanding     Expected Compliance: fair    Evaluation/Monitoring   Eating pattern as discussed Body weight Physical activity    Goals  Food journal, Exercise 30 minutes 5 times per week, Complete lession plans 1-6, Eat 3 meals per day, Eliminate mindless snacking and one planned snack    Include strength training twice per week - either as You Tube exercises or as Yoga with room mate  Take 2 soft chew bariatric fusion vitamin and mineral plus 2 calcium supplements per day   Continue with maintenance dose of vitamin D3  Walk daily , hike in days off with friends/roomate to increase activity   Food log 1800 calories 90 grams protein, 200 grams of carb, 70 grams of fat and 20 grams of fiber in baritastic betzaida  Participate in zoom meetings - provided with contact to be included with zoom pep rally      Refer to MW program     Time Spent:   39 Minutes

## 2020-07-10 ENCOUNTER — OFFICE VISIT (OUTPATIENT)
Dept: BARIATRICS | Facility: CLINIC | Age: 35
End: 2020-07-10

## 2020-07-10 VITALS — TEMPERATURE: 97.7 F | WEIGHT: 296.4 LBS | HEIGHT: 70 IN | BODY MASS INDEX: 42.43 KG/M2

## 2020-07-10 DIAGNOSIS — R63.5 ABNORMAL WEIGHT GAIN: ICD-10-CM

## 2020-07-10 DIAGNOSIS — E66.01 OBESITY, CLASS III, BMI 40-49.9 (MORBID OBESITY) (HCC): Primary | ICD-10-CM

## 2020-07-10 DIAGNOSIS — Z98.84 BARIATRIC SURGERY STATUS: Primary | ICD-10-CM

## 2020-07-10 PROCEDURE — RECHECK: Performed by: DIETITIAN, REGISTERED

## 2020-07-10 PROCEDURE — RECHECK

## 2020-07-10 NOTE — PATIENT INSTRUCTIONS
Goals  Food journal, Exercise 30 minutes 5 times per week, Complete lession plans 1-6, Eat 3 meals per day, Eliminate mindless snacking and one planned snack    Include strength training twice per week - either as You Tube exercises or as Yoga with room mate  Take 2 soft chew bariatric fusion vitamin and mineral plus 2 calcium supplements per day   Continue with maintenance dose of vitamin D3  Walk daily , hike on  days off with friends/roomate to increase activity   Food log 1800 calories 90 grams protein, 200 grams of carb, 70 grams of fat and 20 grams of fiber in baritastic betzaida  Participate in zoom meetings - provided with contact to be included with zoom pep dorita   Read chapter 2 of the bariatric book

## 2020-07-10 NOTE — PROGRESS NOTES
Post op  Engineering tech, has been working from home  Struggling with emotional eating-boredom  Patient had ankle surgery in 2019, was inactive for 3 months  Now working from home due to COVID-19, less active  Patient does disc golf for activity 1x per week  Working on choosing the wrong snacks  Getting 3 meals per day  YUSEF Wells prescribed Tobamax,  Unsure about taking  Getting 6-8 hours of sleep, no issues  Patient reports alcohol use 1x per week  Patient will schedule appointment with Dr Reagan Miranda and will follow up with RD/SW in August  Goals discussed: 1  Increase fluids- currently drinking 32 oz of diet iced tea 2  Start tracking steps 3   Increase activity HC LCSW

## 2020-08-06 ENCOUNTER — OFFICE VISIT (OUTPATIENT)
Dept: BARIATRICS | Facility: CLINIC | Age: 35
End: 2020-08-06
Payer: COMMERCIAL

## 2020-08-06 ENCOUNTER — APPOINTMENT (OUTPATIENT)
Dept: LAB | Facility: CLINIC | Age: 35
End: 2020-08-06
Payer: COMMERCIAL

## 2020-08-06 VITALS
BODY MASS INDEX: 42.36 KG/M2 | HEART RATE: 106 BPM | SYSTOLIC BLOOD PRESSURE: 116 MMHG | DIASTOLIC BLOOD PRESSURE: 76 MMHG | HEIGHT: 70 IN | TEMPERATURE: 98.2 F | WEIGHT: 295.9 LBS

## 2020-08-06 DIAGNOSIS — E66.01 MORBID (SEVERE) OBESITY DUE TO EXCESS CALORIES (HCC): Primary | ICD-10-CM

## 2020-08-06 DIAGNOSIS — R63.5 ABNORMAL WEIGHT GAIN: ICD-10-CM

## 2020-08-06 DIAGNOSIS — E66.01 MORBID (SEVERE) OBESITY DUE TO EXCESS CALORIES (HCC): ICD-10-CM

## 2020-08-06 DIAGNOSIS — Z90.3 POSTGASTRECTOMY MALABSORPTION: ICD-10-CM

## 2020-08-06 DIAGNOSIS — Z48.815 ENCOUNTER FOR SURGICAL AFTERCARE FOLLOWING SURGERY OF DIGESTIVE SYSTEM: ICD-10-CM

## 2020-08-06 DIAGNOSIS — K91.2 POSTGASTRECTOMY MALABSORPTION: ICD-10-CM

## 2020-08-06 DIAGNOSIS — E55.9 VITAMIN D INSUFFICIENCY: ICD-10-CM

## 2020-08-06 DIAGNOSIS — K91.2 POSTSURGICAL MALABSORPTION: ICD-10-CM

## 2020-08-06 LAB
25(OH)D3 SERPL-MCNC: 35.4 NG/ML (ref 30–100)
ANION GAP SERPL CALCULATED.3IONS-SCNC: 2 MMOL/L (ref 4–13)
BUN SERPL-MCNC: 8 MG/DL (ref 5–25)
CALCIUM SERPL-MCNC: 9.5 MG/DL (ref 8.3–10.1)
CHLORIDE SERPL-SCNC: 107 MMOL/L (ref 100–108)
CO2 SERPL-SCNC: 31 MMOL/L (ref 21–32)
CREAT SERPL-MCNC: 0.85 MG/DL (ref 0.6–1.3)
ERYTHROCYTE [DISTWIDTH] IN BLOOD BY AUTOMATED COUNT: 12.9 % (ref 11.6–15.1)
FERRITIN SERPL-MCNC: 207 NG/ML (ref 8–388)
FOLATE SERPL-MCNC: 15.2 NG/ML (ref 3.1–17.5)
GFR SERPL CREATININE-BSD FRML MDRD: 111 ML/MIN/1.73SQ M
GLUCOSE P FAST SERPL-MCNC: 78 MG/DL (ref 65–99)
HCT VFR BLD AUTO: 50.4 % (ref 36.5–49.3)
HGB BLD-MCNC: 16.2 G/DL (ref 12–17)
IRON SATN MFR SERPL: 33 %
IRON SERPL-MCNC: 108 UG/DL (ref 65–175)
MCH RBC QN AUTO: 28.8 PG (ref 26.8–34.3)
MCHC RBC AUTO-ENTMCNC: 32.1 G/DL (ref 31.4–37.4)
MCV RBC AUTO: 90 FL (ref 82–98)
PLATELET # BLD AUTO: 235 THOUSANDS/UL (ref 149–390)
PMV BLD AUTO: 10.1 FL (ref 8.9–12.7)
POTASSIUM SERPL-SCNC: 4.2 MMOL/L (ref 3.5–5.3)
PTH-INTACT SERPL-MCNC: 50.7 PG/ML (ref 18.4–80.1)
RBC # BLD AUTO: 5.62 MILLION/UL (ref 3.88–5.62)
SODIUM SERPL-SCNC: 140 MMOL/L (ref 136–145)
TIBC SERPL-MCNC: 327 UG/DL (ref 250–450)
VIT B12 SERPL-MCNC: 582 PG/ML (ref 100–900)
WBC # BLD AUTO: 6.3 THOUSAND/UL (ref 4.31–10.16)

## 2020-08-06 PROCEDURE — 84425 ASSAY OF VITAMIN B-1: CPT

## 2020-08-06 PROCEDURE — 83970 ASSAY OF PARATHORMONE: CPT

## 2020-08-06 PROCEDURE — 84630 ASSAY OF ZINC: CPT

## 2020-08-06 PROCEDURE — 82306 VITAMIN D 25 HYDROXY: CPT

## 2020-08-06 PROCEDURE — 36415 COLL VENOUS BLD VENIPUNCTURE: CPT

## 2020-08-06 PROCEDURE — 82728 ASSAY OF FERRITIN: CPT

## 2020-08-06 PROCEDURE — 82607 VITAMIN B-12: CPT

## 2020-08-06 PROCEDURE — 84590 ASSAY OF VITAMIN A: CPT

## 2020-08-06 PROCEDURE — 85027 COMPLETE CBC AUTOMATED: CPT

## 2020-08-06 PROCEDURE — 99214 OFFICE O/P EST MOD 30 MIN: CPT | Performed by: FAMILY MEDICINE

## 2020-08-06 PROCEDURE — 82746 ASSAY OF FOLIC ACID SERUM: CPT

## 2020-08-06 PROCEDURE — 83550 IRON BINDING TEST: CPT

## 2020-08-06 PROCEDURE — 80048 BASIC METABOLIC PNL TOTAL CA: CPT

## 2020-08-06 PROCEDURE — 83540 ASSAY OF IRON: CPT

## 2020-08-06 NOTE — PATIENT INSTRUCTIONS
Reviewed the potential side effects of topiramate, which may include numbness or tingling, fatigue, upper respiratory infection symptoms, depression/anxiety/irritabilty, changes in taste, abdominal upset/heartburn, blurry vision and trouble sleeping

## 2020-08-06 NOTE — PROGRESS NOTES
Assessment/Plan:    No problem-specific Assessment & Plan notes found for this encounter  Diagnoses and all orders for this visit:    Morbid (severe) obesity due to excess calories (HCC)    Abnormal weight gain    Postgastrectomy malabsorption    Other orders  -     triamcinolone (KENALOG) 0 1 % ointment  -     triamcinolone (KENALOG) 0 1 % ointment; APPLY TO AFFECTED AREA TWICE A DAY UNTIL DIRECTED TO STOP        -Patient is pursuing Conservative Program  -Initial weight loss goal of 5-10% weight loss for improved health  -Screening labs  - lifestyle modifications and nutritional goals as discussed with SW/RD  - start topamax 25mg qhs, after 4 weeks can increase to 2 tabs (50mg) qhs  Discussed use and common side effects  All questions answered  Initial MWM: 295 9lbs  Current:   Change:  Goal: 225lbs    Goals:  Food log (ie ) www myfitnesspal com,sparkpeople  com,loseit com,calorieking  com,etc  baritastic  No sugary beverages  At least 64oz of water daily  Increase physical activity by 10 minutes daily  Gradually increase physical activity to a goal of 5 days per week for 30 minutes of MODERATE intensity PLUS 2 days per week of FULL BODY resistance training  Practice lesson plans 1-6 in bariatric manual , Practice 30/60 rule, Continue to take recommended bariatric vitamins as directed and Goal protein intake of 60-80 grams per day    Follow up in approximately 2 months with Non-Surgical Physician/Advanced Practitioner or surgical PA  Subjective:   Chief Complaint   Patient presents with    Consult     mwm post op wt gain consult       Patient ID: Lino Warren  is a 38 y o  male with excess weight/obesity here to pursue weight management  -s/p Vertical Sleeve Gastrectomy with Dr Randy Barfield on 6/202/2017  HPI    Post op weight regain consult    Initial:334 lb  EWL:23%  Hussein:225 lb  Goal: 225lbs    1 5 yrs ago after ankle surgery he started gaining weight      He has met with surgical RD/JAYLENE/PA  The following portions of the patient's history were reviewed and updated as appropriate: allergies, current medications, past family history, past medical history, past social history, past surgical history and problem list     Review of Systems   Constitutional: Negative for activity change and appetite change  Respiratory: Negative  Cardiovascular: Negative  Gastrointestinal: Negative  Genitourinary: Negative  Musculoskeletal: Negative for arthralgias  Skin: Negative for rash  Psychiatric/Behavioral: Negative  Objective:    /76 (BP Location: Left arm, Patient Position: Sitting, Cuff Size: Large)   Pulse (!) 106   Temp 98 2 °F (36 8 °C)   Ht 5' 9 7" (1 77 m)   Wt 134 kg (295 lb 14 4 oz)   BMI 42 82 kg/m²      Physical Exam    Constitutional   General appearance: Abnormal   well developed and morbidly obese  Eyes No conjunctival pallor  Ears, Nose, Mouth, and Throat Oral mucosa moist    Pulmonary   Respiratory effort: No increased work of breathing or signs of respiratory distress  Auscultation of lungs: Clear to auscultation, equal breath sounds bilaterally, no wheezes, no rales, no rhonci  Cardiovascular   Auscultation of heart: Normal rate and rhythm, normal S1 and S2, without murmurs  Examination of extremities for edema and/or varicosities: Normal   no edema  Abdomen   Abdomen: Abnormal   The abdomen was obese  Bowel sounds were normal  The abdomen was soft and nontender     Musculoskeletal   Gait and station: Normal     Psychiatric   Orientation to person, place and time: Normal     Affect: appropriate

## 2020-08-09 LAB — ZINC SERPL-MCNC: 86 UG/DL (ref 56–134)

## 2020-08-10 LAB
VIT A SERPL-MCNC: 43.6 UG/DL (ref 18.9–57.3)
VIT B1 BLD-SCNC: 144 NMOL/L (ref 66.5–200)

## 2020-08-21 ENCOUNTER — OFFICE VISIT (OUTPATIENT)
Dept: BARIATRICS | Facility: CLINIC | Age: 35
End: 2020-08-21

## 2020-08-21 VITALS — BODY MASS INDEX: 42.29 KG/M2 | HEIGHT: 70 IN | WEIGHT: 295.4 LBS

## 2020-08-21 DIAGNOSIS — E66.01 OBESITY, CLASS III, BMI 40-49.9 (MORBID OBESITY) (HCC): Primary | ICD-10-CM

## 2020-08-21 PROCEDURE — RECHECK: Performed by: DIETITIAN, REGISTERED

## 2020-08-21 NOTE — PROGRESS NOTES
i  Name was verified by patient stating name? Yes  ii   verified by patient stating? Yes  iii  You verified the patient is alone? Yes  iv  I would like to verify that you are consenting to this telephone visit? Yes  v  This visit is free  Post op with RD  Patient reported weight of 295 lbs on home scale  Saw Dr Michelle Delacruz  and she prescribed Topamax  RD discussed using resistance bands for strength training  Patient reports that he is going to start to do yoga with his roommate  Reports struggling with some symptoms of depression due to a relationship that has not manifested  Goals discussed: 1  Food logging 2  Increase exercise 3  Be mindful of emotional eating-boredom and grazing 4   Having a schedule for exercise HC LCSW

## 2020-08-21 NOTE — PROGRESS NOTES
Bariatric  Nutrition Note  Tele visit  i  Name was verified by patient stating name? yes  ii   verified by patient stating? yes  iii  You verified the patient is alone? yes  iv  I would like to verify that you were offered a live visit but are now consenting to this telephone visit? yes  v  This visit is free yes        Type of surgery  Vertical sleeve gastrectomy  Surgery Date: 2017  3 years  post-op  Surgeon: Dr Sagrario Lee  38 y o   male  Ht 5' 9 7" (1 77 m)   Wt 134 kg (295 lb 6 4 oz)   BMI 42 75 kg/m² ( self reported weight )     [place] Equation:   2276 kcal per day  Estimated calories for weight loss 4605-3516  ( 1-2# per wk wt loss - sedentary )  Estimated protein needs 80-95 grams (1 0-1 2 gms/kg IBW )   Estimated fluid needs 2748 ml (-35 ml/kg IBW ) 92 ounces per day     Wt Readings from Last 3 Encounters:   20 134 kg (295 lb 14 4 oz)   07/10/20 134 kg (296 lb 6 4 oz)   20 134 kg (296 lb 8 oz)       Weight on Day of Weight Loss Surgery: 304 3#  Weight in (lb) to have BMI = 25: 172 7#  Pre-Op Excess Wt: 131 6#  Post-Op Wt Loss: 37 9#/ 23% EBWL in 3 year(s)  Hussein weight = 250#    Review of History and Medications   Past Medical History:   Diagnosis Date    Allergy to environmental factors     As a child patient developed a rash from bleach    Asthma     States weight induced and no medications-no current issues since bypass surgery    Bariatric surgery status     Full dentures     Edentulous- only wears top denture    Headache     Migraine     Morbid obesity ([address] )     Postsurgical malabsorption     Rash     Has rash that now has scabs from itching   States it was biopsied and was told it was an allergic reaction possibly to detergent    Wears glasses      Past Surgical History:   Procedure Laterality Date    DENTAL SURGERY      ESOPHAGOGASTRODUODENOSCOPY N/A 2017    Procedure: ESOPHAGOGASTRODUODENOSCOPY (EGD); Surgeon: Cassie Morris MD;  [facility];  Service: Bariatrics    GASTRIC BYPASS  06/2017    gastric sleeve    DE EGD TRANSORAL BIOPSY SINGLE/MULTIPLE N/A 5/3/2017    Procedure: ESOPHAGOGASTRODUODENOSCOPY (EGD) with bx;  Surgeon: Cassie Morris MD;  [facility]; Service: Bariatrics    DE LAP, ROSALES RESTRICT PROC, LONGITUDINAL GASTRECTOMY N/A 6/20/2017    Procedure: GASTRECTOMY SLEEVE LAPAROSCOPIC;  Surgeon: Cassie Morris MD;  [facility];  Service: Patrici Andreia REPAIR/GRAFT FLEX FOOT TENDON Right 3/27/2019    Procedure: REPAIR TENDON FOOT, Peroneal tendon exploration with possible repair versus tenodesis;   Surgeon: Terri Staley MD;  [facility];  Service: Orthopedics    TONSILLECTOMY       Social History     Socioeconomic History    Marital status: Single     Spouse name: Not on file    Number of children: Not on file    Years of education: Not on file    Highest education level: Not on file   Occupational History    Not on file   Social Needs    Financial resource strain: Not on file    Food insecurity     Worry: Not on file     Inability: Not on file    Transportation needs     Medical: Not on file     Non-medical: Not on file   Tobacco Use    Smoking status: Former Smoker     Years: 10 00     Types: Cigarettes, Cigars     Last attempt to quit: 12/20/2016     Years since quitting: 3 6    Smokeless tobacco: Never Used   Substance and Sexual Activity    Alcohol use: Yes     Frequency: 2-4 times a month     Drinks per session: 1 or 2     Comment: SOCIAL    Drug use: Yes     Types: Marijuana     Comment: social    Sexual activity: Not on file   Lifestyle    Physical activity     Days per week: Not on file     Minutes per session: Not on file    Stress: Not on file   Relationships    Social connections     Talks on phone: Not on file     Gets together: Not on file     Attends [employer] service: Not on file     Active member of club or organization: Not on file     Attends meetings of clubs or organizations: Not on file     Relationship status: Not on file    Intimate partner violence     Fear of current or ex partner: Not on file     Emotionally abused: Not on file     Physically abused: Not on file     Forced sexual activity: Not on file   Other Topics Concern    Not on file   Social History Narrative    Not on file       Current Outpatient Medications:     multivitamin (THERAGRAN) TABS, Take 1 tablet by mouth daily, Disp: , Rfl:     topiramate (TOPAMAX) 25 mg tablet, Take 1 tablet (25 mg total) by mouth daily, Disp: 30 tablet, Rfl: 2    triamcinolone (KENALOG) 0 1 % ointment, , Disp: , Rfl:     triamcinolone (KENALOG) 0 1 % ointment, APPLY TO AFFECTED AREA TWICE A DAY UNTIL DIRECTED TO STOP, Disp: , Rfl:     Vitamin D, Ergocalciferol, 2000 units CAPS, Take 2,000 Units by mouth daily, Disp: , Rfl:      Patient was started on 25 mg of topamax and will increase     Food Intake and Lifestyle Assessment   Food Intake Assessment completed via usual diet recall  5:30 am then breakfast at 6:30 am   Breakfast: 6: 30 am  oatmeal with raisons made with milk   Snack: 0   Lunch: 12: 30 just about anything - sandwich - meat and cheese on bread ( 647) OR  Sometimes beef stick ( 2-3) or cereal ( with milk ) or protein shake ( Premier )   Snack: 0   Dinner: 7 or 8 pm : Since March - chicken , beef, rice rarely potatoes, vegetables   Snack: fruit or potato chips or tortilla chips or pudding or yogurt or jello   Beverage intake: [place] diet tea - 1/4 gallon of tea, occasionally with ronit 16 ounce of coffee per day   Diet texture/stage: regular  Protein supplement: sometimes two to three times per week   Estimated protein intake per day: 70-80 grams   Estimated fluid intake per day: 48-55 ounces per day  - 16 ounces of coffee & 32 ounces of diet tea & 8 ounces water   Meals eaten away from home: rarely since March ( Covid-19 and moved in with "healthy" room mate   Typical meal pattern: 3 meals per day and 1-2 snacks per day  Eating Behaviors: Appropriate diet advancement, Large portion sizes, Frequent snacking/ grazing and Craves salty foods  Does not follow 30/60 rule     Food allergies or intolerances: none noted   Cultural or Restoration considerations: n/a     Physical Assessment  Nutrition Related Findings  Return of Hunger and reports can tolerate larger portions - up to 2 cups of food per meal without discomfort   Starting  topamax to reduce food cravings     Physical Activity  Types of exercise: Walking  Hiking   Current physical limitations: ankle pain     Psychosocial Assessment   Support systems: room mate - "health nut"   Socioeconomic factors: works full time from home     Nutrition Diagnosis( continued )   Diagnosis: Overweight / Obesity (NC-3 3), Excessive energy intake (NI-1 5) and Inappropriate intake of carbohydrates (NI-5 8  3)  Related to: Physical inactivity and Excessive energy intake  As Evidenced by: BMI >25, Expected anthropometric outcomes are not achieved and Unintentional weight gain     Interventions and Teaching   Patient educated on post-op nutrition guidelines  Patient educated and handouts provided    Surgical changes to stomach / GI  Capacity of post-surgery stomach  Adequate hydration  Expected weight loss  Weight loss plateaus/ possibility of weight regain  Exercise  Nutrition considerations after surgery  Protein supplements  Meal planning and preparation  Appropriate carbohydrate, protein, and fat intake, and food/fluid choices to maximize safe weight loss, nutrient intake, and tolerance   Dietary and lifestyle changes  Possible problems with poor eating habits  Intuitive eating  Techniques for self monitoring and keeping daily food journal  Vitamin / Mineral supplementation of Multivitamin with minerals, Calcium, Vitamin B12, Iron, Fat Soluble vitamins and Vitamin D    Discussed importance of support- patient was attending pep rally meetings at [address] [facility] but meetings were stopped due to Covid-19  [facility] is providing zoom pep rally meetings that he can participate in and twice per month virtual support group    Also discussed exercise bands and use of you tube videos to increase strength training   Scheduling time on his calendar to exercise /activity     Education provided to: patient    Barriers to learning: No barriers identified    Readiness to change: preparation    Comprehension: verbalizes understanding     Expected Compliance: fair    Evaluation/Monitoring   Eating pattern as discussed Body weight Physical activity  Has not been doing any strength training - does not have any equipment  Has not been doing any Yoga due to busy schedule  He has been very inconsistent with food logging He has started taking his vitamins and minerals  consistently  Has not been able to connect on zoom or facebook with other patients  Feeling melancholy concerning lack of relationships and interpersonal connections  Finding working from home difficult and alienating       Goals  Food journal, Exercise 30 minutes 5 times per week, Complete lession plans 1-6, Eat 3 meals per day, Eliminate mindless snacking and one planned snack    Include strength training twice per week - either as You Tube exercises( bands )  or as Yoga with room mate  Walk during lunch or when his schedule flexes   Hike on days off with friends/roomate to increase activity   Food log 1800 calories 90 grams protein, 200 grams of carb, 70 grams of fat and 20 grams of fiber in PPDai betzaida  Participate in zoom meetings and virtual support groups - provided link for virtual group      MWM program- continue to follow     Time Spent:   30 Minutes

## 2020-09-12 ENCOUNTER — TELEPHONE (OUTPATIENT)
Dept: OTHER | Facility: HOSPITAL | Age: 35
End: 2020-09-12

## 2020-09-12 DIAGNOSIS — E66.01 MORBID (SEVERE) OBESITY DUE TO EXCESS CALORIES (HCC): ICD-10-CM

## 2020-09-12 DIAGNOSIS — E66.01 CLASS 3 SEVERE OBESITY WITH SERIOUS COMORBIDITY AND BODY MASS INDEX (BMI) OF 40.0 TO 44.9 IN ADULT, UNSPECIFIED OBESITY TYPE (HCC): Primary | ICD-10-CM

## 2020-09-12 RX ORDER — TOPIRAMATE 50 MG/1
50 TABLET, FILM COATED ORAL DAILY
Qty: 10 TABLET | Refills: 0 | Status: SHIPPED | OUTPATIENT
Start: 2020-09-12 | End: 2020-10-06

## 2020-09-12 NOTE — TELEPHONE ENCOUNTER
Received tiger text, patient is out of topamax, adjustment of his medication was made 8/06   Will send 10 day supply of topamax 50 mg qhs

## 2020-09-12 NOTE — TELEPHONE ENCOUNTER
PLEASE REFILL MEDICATION, THE PHARMACY SEND BECAUSE IT WAS ORDER FOR 1-1/2 TABLETS NOT JUST ONE,  HE HAS  RUN OUT    VIA TIGER CONNECT [facility]

## 2020-09-14 RX ORDER — TOPIRAMATE 25 MG/1
50 TABLET ORAL DAILY
Qty: 60 TABLET | Refills: 0 | Status: SHIPPED | OUTPATIENT
Start: 2020-09-14 | End: 2020-09-16 | Stop reason: SDUPTHER

## 2020-09-16 DIAGNOSIS — E66.01 MORBID (SEVERE) OBESITY DUE TO EXCESS CALORIES (HCC): ICD-10-CM

## 2020-09-16 RX ORDER — TOPIRAMATE 25 MG/1
50 TABLET ORAL DAILY
Qty: 60 TABLET | Refills: 0 | Status: SHIPPED | OUTPATIENT
Start: 2020-09-16 | End: 2020-10-06

## 2020-09-16 NOTE — TELEPHONE ENCOUNTER
Pt called to get a refill over the weekend and was given a 10 day supply by   On call    Pt is now requesting his regular refill

## 2020-10-06 ENCOUNTER — OFFICE VISIT (OUTPATIENT)
Dept: BARIATRICS | Facility: CLINIC | Age: 35
End: 2020-10-06
Payer: COMMERCIAL

## 2020-10-06 VITALS
DIASTOLIC BLOOD PRESSURE: 80 MMHG | WEIGHT: 293 LBS | BODY MASS INDEX: 41.95 KG/M2 | HEIGHT: 70 IN | SYSTOLIC BLOOD PRESSURE: 124 MMHG | TEMPERATURE: 97.7 F | HEART RATE: 83 BPM

## 2020-10-06 DIAGNOSIS — E66.01 MORBID (SEVERE) OBESITY DUE TO EXCESS CALORIES (HCC): Primary | ICD-10-CM

## 2020-10-06 DIAGNOSIS — Z48.815 ENCOUNTER FOR SURGICAL AFTERCARE FOLLOWING SURGERY OF DIGESTIVE SYSTEM: ICD-10-CM

## 2020-10-06 DIAGNOSIS — Z98.84 BARIATRIC SURGERY STATUS: ICD-10-CM

## 2020-10-06 PROCEDURE — 99213 OFFICE O/P EST LOW 20 MIN: CPT | Performed by: PHYSICIAN ASSISTANT

## 2020-10-06 RX ORDER — TOPIRAMATE 50 MG/1
50 TABLET, FILM COATED ORAL 2 TIMES DAILY
Qty: 30 TABLET | Refills: 1 | Status: SHIPPED | OUTPATIENT
Start: 2020-10-06 | End: 2020-11-10

## 2020-10-17 ENCOUNTER — OFFICE VISIT (OUTPATIENT)
Dept: URGENT CARE | Facility: CLINIC | Age: 35
End: 2020-10-17
Payer: COMMERCIAL

## 2020-10-17 VITALS
BODY MASS INDEX: 42.09 KG/M2 | DIASTOLIC BLOOD PRESSURE: 86 MMHG | HEART RATE: 88 BPM | TEMPERATURE: 97.7 F | HEIGHT: 70 IN | RESPIRATION RATE: 18 BRPM | WEIGHT: 294 LBS | OXYGEN SATURATION: 97 % | SYSTOLIC BLOOD PRESSURE: 118 MMHG

## 2020-10-17 DIAGNOSIS — L03.011 PARONYCHIA OF RIGHT RING FINGER: Primary | ICD-10-CM

## 2020-10-17 PROCEDURE — 26010 DRAINAGE OF FINGER ABSCESS: CPT | Performed by: PHYSICIAN ASSISTANT

## 2020-10-17 PROCEDURE — S9083 URGENT CARE CENTER GLOBAL: HCPCS | Performed by: PHYSICIAN ASSISTANT

## 2020-10-17 PROCEDURE — G0382 LEV 3 HOSP TYPE B ED VISIT: HCPCS | Performed by: PHYSICIAN ASSISTANT

## 2020-10-17 RX ORDER — CEPHALEXIN 500 MG/1
500 CAPSULE ORAL EVERY 6 HOURS SCHEDULED
Qty: 28 CAPSULE | Refills: 0 | Status: SHIPPED | OUTPATIENT
Start: 2020-10-17 | End: 2020-10-24

## 2020-11-08 DIAGNOSIS — E66.01 MORBID (SEVERE) OBESITY DUE TO EXCESS CALORIES (HCC): ICD-10-CM

## 2020-11-10 ENCOUNTER — TELEMEDICINE (OUTPATIENT)
Dept: BARIATRICS | Facility: CLINIC | Age: 35
End: 2020-11-10
Payer: COMMERCIAL

## 2020-11-10 ENCOUNTER — TELEPHONE (OUTPATIENT)
Dept: BARIATRICS | Facility: CLINIC | Age: 35
End: 2020-11-10

## 2020-11-10 VITALS — HEIGHT: 70 IN | BODY MASS INDEX: 41.66 KG/M2 | WEIGHT: 291 LBS

## 2020-11-10 DIAGNOSIS — Z98.84 BARIATRIC SURGERY STATUS: ICD-10-CM

## 2020-11-10 DIAGNOSIS — E66.01 MORBID (SEVERE) OBESITY DUE TO EXCESS CALORIES (HCC): Primary | ICD-10-CM

## 2020-11-10 DIAGNOSIS — Z48.815 ENCOUNTER FOR SURGICAL AFTERCARE FOLLOWING SURGERY OF DIGESTIVE SYSTEM: ICD-10-CM

## 2020-11-10 PROCEDURE — 99213 OFFICE O/P EST LOW 20 MIN: CPT | Performed by: PHYSICIAN ASSISTANT

## 2020-11-10 RX ORDER — TOPIRAMATE 50 MG/1
TABLET, FILM COATED ORAL
Qty: 30 TABLET | Refills: 1 | OUTPATIENT
Start: 2020-11-10

## 2020-11-10 RX ORDER — TOPIRAMATE 100 MG/1
100 TABLET, FILM COATED ORAL
Qty: 90 TABLET | Refills: 0 | Status: SHIPPED | OUTPATIENT
Start: 2020-11-10 | End: 2020-12-16 | Stop reason: SDUPTHER

## 2020-12-16 DIAGNOSIS — E66.01 MORBID (SEVERE) OBESITY DUE TO EXCESS CALORIES (HCC): ICD-10-CM

## 2020-12-16 DIAGNOSIS — Z98.84 BARIATRIC SURGERY STATUS: ICD-10-CM

## 2020-12-16 RX ORDER — TOPIRAMATE 100 MG/1
100 TABLET, FILM COATED ORAL
Qty: 30 TABLET | Refills: 1 | Status: SHIPPED | OUTPATIENT
Start: 2020-12-16 | End: 2020-12-16 | Stop reason: SDUPTHER

## 2020-12-16 RX ORDER — TOPIRAMATE 100 MG/1
100 TABLET, FILM COATED ORAL
Qty: 90 TABLET | Refills: 0 | Status: SHIPPED | OUTPATIENT
Start: 2020-12-16 | End: 2021-05-14

## 2021-02-01 ENCOUNTER — TELEMEDICINE (OUTPATIENT)
Dept: BARIATRICS | Facility: CLINIC | Age: 36
End: 2021-02-01
Payer: COMMERCIAL

## 2021-02-01 VITALS — HEIGHT: 70 IN | BODY MASS INDEX: 41.5 KG/M2 | WEIGHT: 289.9 LBS

## 2021-02-01 DIAGNOSIS — Z48.815 ENCOUNTER FOR SURGICAL AFTERCARE FOLLOWING SURGERY OF DIGESTIVE SYSTEM: ICD-10-CM

## 2021-02-01 DIAGNOSIS — Z98.84 BARIATRIC SURGERY STATUS: ICD-10-CM

## 2021-02-01 DIAGNOSIS — E66.01 MORBID (SEVERE) OBESITY DUE TO EXCESS CALORIES (HCC): Primary | ICD-10-CM

## 2021-02-01 PROCEDURE — 99213 OFFICE O/P EST LOW 20 MIN: CPT | Performed by: PHYSICIAN ASSISTANT

## 2021-02-01 NOTE — PROGRESS NOTES
Virtual Regular Visit  Assessment/Plan: Morbid (severe) obesity due to excess calories ([address] )  Improved down a net 3 lb from his last visit  He has been on 100 mg of topiramate-tolerating without memory issues or paresthesias  He notes he doesn't feel the topiramate has been helping with his weight loss and he recently added an OTC herbal supplement called meticore which "increases thermal heat" per patient and he feels this is what helped him lose a couple more pounds/feels it is helping curbing his intakes -he is following a conservative program -aiming for 1800 calories per day per patient and averaging closer to 2584-8668 per day -he is getting good protein intakes in  I advised that 2700-5992 calories per day would more likely help him lose weight  He wants to come off the topiramate and only try the herbal supplement  While most of the product is vitamins/vitamin b12, chrominum etc  Advised him I am unfamiliar on all the herbals in the product and cannot assure him this is safe or unsafe and unsure of any med interactions this may cause  I did encourage him to also review with pharmacist for same  Advised if he wants to continue on it this would be a potential risk of taking OTC herbals and he verbalized understanding  Advised to continue 7865-3039 calories per day with around 70-80 grams of protein per day  And encouraged exercise which appears to be variable at present  He notes he just wants to keep his annual visit with me in June so will plan that for now  I gave him options to check with his insurance re: possible covering saxenda-he will check to see if he has any family history of MEN2 cancers  He does not have personal history of pancreatitis  Also advised we could consider phentermine as another option if he wants rx from us  -but if so we would need baseline EKG  He wants to continue with current herbal/vitamin supplement for now on his own    Advised on taper off topiramate 50 mg for 3 days then 25 mg for 3 days then off       Encounter for surgical aftercare following surgery of digestive system  He is status post laparoscopic sleeve gastrectomy 6/20/2017 by Dr Yazmin Woods and has been on topiramate to help him lose weight  INITIAL weight: 334 4 lb/pre-op  Post-op ecdric: 250 lb     Initial weight with topiramate start: 295 9  Lb  Current weight: 289 9 lb  Goal weight of 225 lb-by MWM    Wants to taper off topiramate and continue his OTC herbal/vitamin supplement-medicore            Follow up in approximately 5 months with advanced practitioner on the surgical side of the office  Diagnoses and all orders for this visit:    Morbid (severe) obesity due to excess calories [facility])    Encounter for surgical aftercare following surgery of digestive system    Bariatric surgery status            Assessment/Plan:    Problem List Items Addressed This Visit        Other    Morbid (severe) obesity due to excess calories ([facility] [address] ) - Primary     Improved down a net 3 lb from his last visit  He has been on 100 mg of topiramate-tolerating without memory issues or paresthesias  He notes he doesn't feel the topiramate has been helping with his weight loss and he recently added an OTC herbal supplement called meticore which "increases thermal heat" per patient and he feels this is what helped him lose a couple more pounds/feels it is helping curbing his intakes -he is following a conservative program -aiming for 1800 calories per day per patient and averaging closer to 6588-1336 per day -he is getting good protein intakes in  I advised that 4557-5912 calories per day would more likely help him lose weight  He wants to come off the topiramate and only try the herbal supplement  While most of the product is vitamins/vitamin b12, chrominum etc  Advised him I am unfamiliar on all the herbals in the product and cannot assure him this is safe or unsafe and unsure of any med interactions this may cause     I did encourage him to also review with pharmacist for same  Advised if he wants to continue on it this would be a potential risk of taking OTC herbals and he verbalized understanding  Advised to continue 8872-9159 calories per day with around 70-80 grams of protein per day  And encouraged exercise which appears to be variable at present  He notes he just wants to keep his annual visit with me in June so will plan that for now  I gave him options to check with his insurance re: possible covering saxenda-he will check to see if he has any family history of MEN2 cancers  He does not have personal history of pancreatitis  Also advised we could consider phentermine as another option if he wants rx from us  -but if so we would need baseline EKG  He wants to continue with current herbal/vitamin supplement for now on his own  Advised on taper off topiramate 50 mg for 3 days then 25 mg for 3 days then off            Bariatric surgery status    Encounter for surgical aftercare following surgery of digestive system     He is status post laparoscopic sleeve gastrectomy 6/20/2017 by Dr Mildred Rosenberg and has been on topiramate to help him lose weight  INITIAL weight: 334 4 lb/pre-op  Post-op cedric: 250 lb     Initial weight with topiramate start: 295 9  Lb  Current weight: 289 9 lb  Goal weight of 225 lb-by MWM    Wants to taper off topiramate and continue his OTC herbal/vitamin supplement-medicore                          Reason for visit is   Chief Complaint   Patient presents with    Virtual Regular Visit        Encounter provider Yann Saunders PA-C    Provider located at [address] [place] [phone]      Recent Visits  No visits were found meeting these conditions     Showing recent visits within past 7 days and meeting all other requirements     Today's Visits  Date Type Provider Dept   02/01/21 Telemedicine Yann Saunders PA-C [facility] Showing today's visits and meeting all other requirements     Future Appointments  No visits were found meeting these conditions  Showing future appointments within next 150 days and meeting all other requirements        The patient was identified by name and date of birth  João Monroy was informed that this is a telemedicine visit and that the visit is being conducted through [employer] and patient was informed that this is a secure, HIPAA-compliant platform  He agrees to proceed     My office door was closed  No one else was in the room  He acknowledged consent and understanding of privacy and security of the video platform  The patient has agreed to participate and understands they can discontinue the visit at any time  Patient is aware this is a billable service  Prabha Lynch is a 43 y o  male with history of gastric surgery by Dr Kaley Hawkins here to assess weight loss on topiramate      He is here in follow-up via TEAMS to assess diet and weight loss-on topiramate 100 mg nightly  He notes he recently added an OT       Past Medical History:   Diagnosis Date    Allergy to environmental factors     As a child patient developed a rash from bleach    Asthma     States weight induced and no medications-no current issues since bypass surgery    Bariatric surgery status     Full dentures     Edentulous- only wears top denture    Headache     Migraine     Morbid obesity ([facility] [address] )     Postsurgical malabsorption     Rash     Has rash that now has scabs from itching  States it was biopsied and was told it was an allergic reaction possibly to detergent    Wears glasses        Past Surgical History:   Procedure Laterality Date    DENTAL SURGERY      ESOPHAGOGASTRODUODENOSCOPY N/A 6/20/2017    Procedure: ESOPHAGOGASTRODUODENOSCOPY (EGD);   Surgeon: Paxton Santos MD;  [facility];  Service: Bariatrics    GASTRIC BYPASS  06/2017    gastric sleeve    [place] EGD TRANSORAL BIOPSY SINGLE/MULTIPLE N/A 5/3/2017    Procedure: ESOPHAGOGASTRODUODENOSCOPY (EGD) with bx;  Surgeon: Larry Schulz MD;  [facility]; Service: Bariatrics    [place] LAP, ROSALES RESTRICT PROC, LONGITUDINAL GASTRECTOMY N/A 6/20/2017    Procedure: GASTRECTOMY SLEEVE LAPAROSCOPIC;  Surgeon: Larry Schulz MD;  [facility];  Service: Sabine Ewings REPAIR/GRAFT FLEX FOOT TENDON Right 3/27/2019    Procedure: REPAIR TENDON FOOT, Peroneal tendon exploration with possible repair versus tenodesis; Surgeon: Joyce Kessler MD;  [facility];  Service: Orthopedics    TONSILLECTOMY         Current Outpatient Medications   Medication Sig Dispense Refill    multivitamin (THERAGRAN) TABS Take 1 tablet by mouth daily      topiramate (TOPAMAX) 100 mg tablet Take 1 tablet (100 mg total) by mouth daily at bedtime 90 tablet 0    triamcinolone (KENALOG) 0 1 % ointment       Vitamin D, Ergocalciferol, 2000 units CAPS Take 2,000 Units by mouth daily       No current facility-administered medications for this visit  Allergies   Allergen Reactions    Nsaids      gastric bypass        Review of Systems   Constitutional: Negative for chills and fever  Unexpected weight change: planned weight loss  Respiratory: Negative for shortness of breath and wheezing  Cardiovascular: Negative for chest pain and palpitations  Gastrointestinal: Negative for abdominal pain, constipation, diarrhea, nausea and vomiting  Psychiatric/Behavioral: Negative for suicidal ideas (no complait of anxiety or depression)  Video Exam    Vitals:    02/01/21 1433   Weight: 131 kg (289 lb 14 4 oz)   Height: 5' 9 7" (1 77 m)       Physical Exam  Constitutional:       Comments: Morbidly obese   Pulmonary:      Effort: Pulmonary effort is normal    Abdominal:      Comments: Unable to assess via video today   Neurological:      Mental Status: He is alert and oriented to person, place, and time     Psychiatric:         Mood and Affect: Mood normal           I spent 25 minutes directly with the patient during this visit      [address] acknowledges that he has consented to an online visit or consultation  He understands that the online visit is based solely on information provided by him, and that, in the absence of a face-to-face physical evaluation by the physician, the diagnosis he receives is both limited and provisional in terms of accuracy and completeness  This is not intended to replace a full medical face-to-face evaluation by the physician  Eden Ramirez understands and accepts these terms

## 2021-02-01 NOTE — PATIENT INSTRUCTIONS
It was great to see you again today  For tapering off topiramate take 1/2 of the 100 mg tablet once nightly for 3 nights-then take 1/4 of a pill for 3 nights then can stop it  As we discussed some herbal products are not fully regulated and you may or may not be getting what is listed on the label  If you take this -you are overall taking it "at your own risk"  While most of the ingredients appear safe I am not fully familiar with some of the ingredients  I would recommend that you also speak with a pharmacist so that they can check the ingredients to make sure there are no medication interactions with what you are already taking to be safe    Continue with diet-recommend that you food log-with goal of around 1600 calories and 70-80 grams of protein per day  Exercise at least 5 days per week with cardio and 2 days per week with weight resistance as a gaol  You can use [employer]itnesspal com or our [employer] betzaida to log your intakes  If you want help with diet you can meet with RD/ as needed between visits  Follow-up in June 2021 as already scheduled  We kindly ask that you arrive 15 minutes before your scheduled appointment time with your provider to allow you to be roomed, have your vital signs checked and your chart updated by our staff  We thank you for your patience at your visit  Your appointment time is reserved only for you  If you are unable to make your scheduled visit, we would request that you call to cancel and reschedule it at your earliest convenience  Follow diet as discussed  Follow  vitamin and mineral recommendations as reviewed with you  Bariatric vitamins are highly recommended  Vitamins are important for a life-time  Low levels can lead to deficiency which can lead to other medical problems      Exercise as tolerated    If you have gotten a lab slip at this visit, please note that most labs are FASTING-but you need to drink water the night before and the morning before your labs are done  It is HIGHLY RECOMMENDED that you check with your insurance to make sure all the labs ordered are covered by your individual insurance policy  This is especially important if you also get labs done by other providers outside of [facility]  You want to avoid having duplicate labs done  Note you will be given a lab slip AFTER  your annual visit next year to check your vitamin and mineral levels  You will not need to make a second appointment after the labs are received/reviewed  You will receive a letter/and or phone call with your results  Most labs do NOT honor a lab slip dated one year in advance now  IMPORTANT if you have a [facility] "[employer]" account, you will receive a letter of your vitamin/mineral results through the computer  Please watch for an update to your chart-since recommendations for supplement adjustments will be sent to you this way  Call our office if he have any problems with abdominal pain especially if associated with fever, chills, nausea, vomiting or any other concerns  All  Post-bariatric surgery patients should be aware that very small quantities of any alcohol  can cause impairment and it is very possible not to feel the effect  The effect can be in the system for several hours  It is also a stomach irritant  It is advised to AVOID alcohol, Nonsteroidal antiinflammatory drugs (NSAIDS) and nicotine of all forms   Any of these can cause stomach irritation/pain

## 2021-02-01 NOTE — ASSESSMENT & PLAN NOTE
He is status post laparoscopic sleeve gastrectomy 6/20/2017 by Dr Erna Albert and has been on topiramate to help him lose weight  INITIAL weight: 334 4 lb/pre-op  Post-op cedric: 250 lb     Initial weight with topiramate start: 295 9  Lb  Current weight: 289 9 lb  Goal weight of 225 lb-by MWM    Wants to taper off topiramate and continue his OTC herbal/vitamin supplement-medicore

## 2021-02-01 NOTE — ASSESSMENT & PLAN NOTE
Improved down a net 3 lb from his last visit  He has been on 100 mg of topiramate-tolerating without memory issues or paresthesias  He notes he doesn't feel the topiramate has been helping with his weight loss and he recently added an OTC herbal supplement called meticore which "increases thermal heat" per patient and he feels this is what helped him lose a couple more pounds/feels it is helping curbing his intakes -he is following a conservative program -aiming for 1800 calories per day per patient and averaging closer to 5505-3120 per day -he is getting good protein intakes in  I advised that 2780-8073 calories per day would more likely help him lose weight  He wants to come off the topiramate and only try the herbal supplement  While most of the product is vitamins/vitamin b12, chrominum etc  Advised him I am unfamiliar on all the herbals in the product and cannot assure him this is safe or unsafe and unsure of any med interactions this may cause  I did encourage him to also review with pharmacist for same  Advised if he wants to continue on it this would be a potential risk of taking OTC herbals and he verbalized understanding  Advised to continue 4216-2039 calories per day with around 70-80 grams of protein per day  And encouraged exercise which appears to be variable at present  He notes he just wants to keep his annual visit with me in June so will plan that for now  I gave him options to check with his insurance re: possible covering saxenda-he will check to see if he has any family history of MEN2 cancers  He does not have personal history of pancreatitis  Also advised we could consider phentermine as another option if he wants rx from us  -but if so we would need baseline EKG  He wants to continue with current herbal/vitamin supplement for now on his own  Advised on taper off topiramate 50 mg for 3 days then 25 mg for 3 days then off  Austinrita Bone

## 2021-04-01 DIAGNOSIS — Z23 ENCOUNTER FOR IMMUNIZATION: ICD-10-CM

## 2021-04-11 ENCOUNTER — IMMUNIZATIONS (OUTPATIENT)
Dept: FAMILY MEDICINE CLINIC | Facility: HOSPITAL | Age: 36
End: 2021-04-11

## 2021-04-11 DIAGNOSIS — Z23 ENCOUNTER FOR IMMUNIZATION: Primary | ICD-10-CM

## 2021-04-11 PROCEDURE — 0001A SARS-COV-2 / COVID-19 MRNA VACCINE (PFIZER-BIONTECH) 30 MCG: CPT

## 2021-04-11 PROCEDURE — 91300 SARS-COV-2 / COVID-19 MRNA VACCINE (PFIZER-BIONTECH) 30 MCG: CPT

## 2021-05-02 ENCOUNTER — IMMUNIZATIONS (OUTPATIENT)
Dept: FAMILY MEDICINE CLINIC | Facility: HOSPITAL | Age: 36
End: 2021-05-02

## 2021-05-02 DIAGNOSIS — Z23 ENCOUNTER FOR IMMUNIZATION: Primary | ICD-10-CM

## 2021-05-02 PROCEDURE — 91300 SARS-COV-2 / COVID-19 MRNA VACCINE (PFIZER-BIONTECH) 30 MCG: CPT

## 2021-05-02 PROCEDURE — 0002A SARS-COV-2 / COVID-19 MRNA VACCINE (PFIZER-BIONTECH) 30 MCG: CPT

## 2021-05-14 ENCOUNTER — OFFICE VISIT (OUTPATIENT)
Dept: INTERNAL MEDICINE CLINIC | Facility: CLINIC | Age: 36
End: 2021-05-14
Payer: COMMERCIAL

## 2021-05-14 VITALS
DIASTOLIC BLOOD PRESSURE: 84 MMHG | SYSTOLIC BLOOD PRESSURE: 138 MMHG | HEART RATE: 106 BPM | TEMPERATURE: 98.4 F | BODY MASS INDEX: 42.58 KG/M2 | OXYGEN SATURATION: 95 % | WEIGHT: 297.4 LBS | HEIGHT: 70 IN

## 2021-05-14 DIAGNOSIS — R19.09 LUMP IN THE GROIN: Primary | ICD-10-CM

## 2021-05-14 DIAGNOSIS — K91.2 POSTGASTRECTOMY MALABSORPTION: ICD-10-CM

## 2021-05-14 DIAGNOSIS — E66.01 MORBID (SEVERE) OBESITY DUE TO EXCESS CALORIES (HCC): ICD-10-CM

## 2021-05-14 DIAGNOSIS — E55.9 VITAMIN D INSUFFICIENCY: ICD-10-CM

## 2021-05-14 DIAGNOSIS — Z90.3 POSTGASTRECTOMY MALABSORPTION: ICD-10-CM

## 2021-05-14 PROCEDURE — 99203 OFFICE O/P NEW LOW 30 MIN: CPT | Performed by: NURSE PRACTITIONER

## 2021-05-14 NOTE — PROGRESS NOTES
INTERNAL MEDICINE INITIAL OFFICE VISIT  [facility]    NAME: Mary Colin  AGE: 43 y o  SEX: male  : 1985     DATE: 2021     Assessment and Plan:   Healthy obese 42 yo male  Continue routine care with bariatrics  Advised diet and exercise habits  Will complete u/s of left groin area where suspected cyst is felt  Referral placed to general surgery for removal      Problem List Items Addressed This Visit        Digestive    Postgastrectomy malabsorption     Follows closely with bariatrics              Other    Morbid (severe) obesity due to excess calories ([address] )    Vitamin D insufficiency      Other Visit Diagnoses     Lump in the groin    -  Primary    Relevant Orders    Ambulatory referral to [address] limited          Return in about 1 year (around 2022), or if symptoms worsen or fail to improve, for Annual physical      Chief Complaint:     Chief Complaint   Patient presents with    Establish Care      History of Present Illness:     New patient here to establish care  Prior PCP was Dr Maite Negron but has not been seen in many years  History of gastric bypass surgery and follows with bariatrics routinely  Family history of HTN, heart disease and bariatric surgeries  Patient smoke occasional cigars, and marijuana  Drinks etoh occasionally  Here today with complaint of a cyst near the left hip  States he had pain in this area the other day and felt a lump  Was unsure what it was and that is what prompted him to make this appointment  Would like it looked at further  Discussed that it appears to be a small fluid filled cyst  Can ultrasound for confirmation and would refer to general surgery for removal  Patient would like this removed  No signs of infections, no drainage, not tender to touch  Follows with bariatrics routinely  Taking multivitamin and vitamin d daily  Has labs every two years due to the cost of this   Last labs in 8/2020 were normal  Last lipid panel from 2017 was normal  Patient eats a fairly balanced diet  He does not exercise but plays 18 rounds of disc golf  Wears contacts would like to have lasik procedure  Follows with a dentist  Has upper dentures and is saving up for lower dentures  Goes to [place] in [facility] because it [place] cheaper than [place]  The following portions of the patient's history were reviewed and updated as appropriate: allergies, current medications, past family history, past medical history, past social history, past surgical history and problem list      Review of Systems:     Review of Systems   Constitutional: Negative for chills, fatigue and fever  HENT: Positive for dental problem  Negative for congestion, sinus pain and sore throat  Eyes: Negative for visual disturbance  Respiratory: Negative for cough, chest tightness and shortness of breath  Cardiovascular: Negative for chest pain and palpitations  Gastrointestinal: Negative for abdominal pain, constipation and diarrhea  Musculoskeletal: Negative for arthralgias and myalgias  Skin: Positive for rash (lump on left hip)  Neurological: Negative for dizziness and headaches  Psychiatric/Behavioral: Negative for dysphoric mood and sleep disturbance  The patient is not nervous/anxious  Past Medical History:     Past Medical History:   Diagnosis Date    Allergy to environmental factors     As a child patient developed a rash from bleach    Asthma     States weight induced and no medications-no current issues since bypass surgery    Bariatric surgery status     Full dentures     Edentulous- only wears top denture    Headache     Migraine     Morbid obesity ([address] )     Postsurgical malabsorption     Rash     Has rash that now has scabs from itching   States it was biopsied and was told it was an allergic reaction possibly to detergent    Wears glasses         Past Surgical History:     Past Surgical History:   Procedure Laterality Date    DENTAL SURGERY      ESOPHAGOGASTRODUODENOSCOPY N/A 2017    Procedure: ESOPHAGOGASTRODUODENOSCOPY (EGD); Surgeon: Elizabeth Jimenes MD;  [facility];  Service: Bariatrics    GASTRIC BYPASS  2017    gastric sleeve    [place] EGD TRANSORAL BIOPSY SINGLE/MULTIPLE N/A 5/3/2017    Procedure: ESOPHAGOGASTRODUODENOSCOPY (EGD) with bx;  Surgeon: Elizabeth Jimenes MD;  [facility]; Service: Bariatrics    [place] LAP, ROSALES RESTRICT PROC, LONGITUDINAL GASTRECTOMY N/A 2017    Procedure: GASTRECTOMY SLEEVE LAPAROSCOPIC;  Surgeon: Elizabeth Jimenes MD;  [facility];  Service: Susanna Lariosino REPAIR/GRAFT FLEX FOOT TENDON Right 3/27/2019    Procedure: REPAIR TENDON FOOT, Peroneal tendon exploration with possible repair versus tenodesis;   Surgeon: Chau Funez MD;  [facility];  Service: Orthopedics    TONSILLECTOMY          Social History:     Social History     Socioeconomic History    Marital status: Single     Spouse name: None    Number of children: None    Years of education: None    Highest education level: None   Occupational History    None   Social Needs    Financial resource strain: None    Food insecurity     Worry: None     Inability: None    Transportation needs     Medical: None     Non-medical: None   Tobacco Use    Smoking status: Former Smoker     Years: 10      Types: Cigarettes, Cigars     Quit date: 2016     Years since quittin 4    Smokeless tobacco: Never Used   Substance and Sexual Activity    Alcohol use: Yes     Frequency: 2-4 times a month     Drinks per session: 1 or 2     Comment: SOCIAL    Drug use: Yes     Types: Marijuana     Comment: social    Sexual activity: None   Lifestyle    Physical activity     Days per week: 0 days     Minutes per session: 0 min    Stress: Not at all   Relationships    Social connections     Talks on phone: None     Gets together: None     Attends Congregational service: None     Active member of club or organization: None     Attends meetings of clubs or organizations: None     Relationship status: None    Intimate partner violence     Fear of current or ex partner: None     Emotionally abused: None     Physically abused: None     Forced sexual activity: None   Other Topics Concern    None   Social History Narrative    None         Family History:     Family History   Problem Relation Age of Onset    No Known Problems Mother     Obesity Father     Heart disease Father     Diabetes Father         Current Medications:     Current Outpatient Medications:     multivitamin (THERAGRAN) TABS, Take 1 tablet by mouth daily, Disp: , Rfl:     Vitamin D, Ergocalciferol, 2000 units CAPS, Take 2,000 Units by mouth daily, Disp: , Rfl:      Allergies: Allergies   Allergen Reactions    Nsaids      gastric bypass         Physical Exam:     /84 (BP Location: Left arm, Patient Position: Sitting, Cuff Size: Large)   Pulse (!) 106   Temp 98 4 °F (36 9 °C) (Temporal) Comment: no nsaids  Ht 5' 10" (1 778 m)   Wt 135 kg (297 lb 6 4 oz)   SpO2 95%   BMI 42 67 kg/m²     Physical Exam  Vitals signs reviewed  Constitutional:       General: He is not in acute distress  Appearance: Normal appearance  He is well-developed  He is not diaphoretic  HENT:      Head: Normocephalic and atraumatic  Right Ear: Hearing, tympanic membrane, ear canal and external ear normal       Left Ear: Hearing, tympanic membrane, ear canal and external ear normal       Nose: Nose normal  No mucosal edema or rhinorrhea  Mouth/Throat:      Dentition: No dental caries  Pharynx: Uvula midline  No oropharyngeal exudate  Eyes:      General: Lids are normal          Right eye: No discharge  Left eye: No discharge  Conjunctiva/sclera: Conjunctivae normal       Pupils: Pupils are equal, round, and reactive to light     Neck:      Musculoskeletal: Normal range of motion and neck supple  Thyroid: No thyromegaly  Trachea: Trachea and phonation normal  No tracheal deviation  Cardiovascular:      Rate and Rhythm: Normal rate and regular rhythm  Pulses: Normal pulses  Heart sounds: Normal heart sounds  No murmur  Pulmonary:      Effort: Pulmonary effort is normal  No respiratory distress  Breath sounds: Normal breath sounds  No wheezing  Abdominal:      General: Abdomen is protuberant  Bowel sounds are normal  There is no distension  Palpations: Abdomen is soft  There is no hepatomegaly or splenomegaly  Tenderness: There is no abdominal tenderness  Comments: 1 cm round mobile, soft, fluid filled cyst  No TTP  Musculoskeletal: Normal range of motion  General: No tenderness  Lymphadenopathy:      Cervical: No cervical adenopathy  Skin:     General: Skin is warm and dry  Capillary Refill: Capillary refill takes less than 2 seconds  Findings: No ecchymosis, erythema or rash  Neurological:      Mental Status: He is alert and oriented to person, place, and time  Sensory: No sensory deficit  Psychiatric:         Attention and Perception: Attention normal          Mood and Affect: Mood normal          Speech: Speech normal          Behavior: Behavior normal          Thought Content: Thought content normal          Judgment: Judgment normal           Data:     Laboratory Results: I have personally reviewed the pertinent laboratory results/reports   Radiology/Other Diagnostic Testing Results: I have personally reviewed pertinent reports  There are no Patient Instructions on file for this visit      Robbie Mcburney, CRNP  [facility] [address] [place] [address]

## 2021-05-21 ENCOUNTER — OFFICE VISIT (OUTPATIENT)
Dept: URGENT CARE | Facility: CLINIC | Age: 36
End: 2021-05-21
Payer: COMMERCIAL

## 2021-05-21 VITALS
HEART RATE: 89 BPM | SYSTOLIC BLOOD PRESSURE: 128 MMHG | HEIGHT: 70 IN | OXYGEN SATURATION: 98 % | DIASTOLIC BLOOD PRESSURE: 80 MMHG | RESPIRATION RATE: 20 BRPM | BODY MASS INDEX: 42.37 KG/M2 | TEMPERATURE: 98.3 F | WEIGHT: 296 LBS

## 2021-05-21 DIAGNOSIS — L02.91 CUTANEOUS ABSCESS, UNSPECIFIED SITE: Primary | ICD-10-CM

## 2021-05-21 PROCEDURE — 87205 SMEAR GRAM STAIN: CPT | Performed by: PHYSICIAN ASSISTANT

## 2021-05-21 PROCEDURE — 87077 CULTURE AEROBIC IDENTIFY: CPT | Performed by: PHYSICIAN ASSISTANT

## 2021-05-21 PROCEDURE — 87186 SC STD MICRODIL/AGAR DIL: CPT | Performed by: PHYSICIAN ASSISTANT

## 2021-05-21 PROCEDURE — 99203 OFFICE O/P NEW LOW 30 MIN: CPT | Performed by: PHYSICIAN ASSISTANT

## 2021-05-21 PROCEDURE — 87070 CULTURE OTHR SPECIMN AEROBIC: CPT | Performed by: PHYSICIAN ASSISTANT

## 2021-05-21 RX ORDER — SULFAMETHOXAZOLE AND TRIMETHOPRIM 800; 160 MG/1; MG/1
1 TABLET ORAL EVERY 12 HOURS SCHEDULED
Qty: 14 TABLET | Refills: 0 | Status: SHIPPED | OUTPATIENT
Start: 2021-05-21 | End: 2021-05-28

## 2021-05-21 NOTE — PROGRESS NOTES
[facility]    NAME: Bj Miller is a 43 y o  male  : 1985    MRN: [id number]  DATE: May 21, 2021  TIME: 3:51 PM    Assessment and Plan   Cutaneous abscess, unspecified site [L02 [id number]]  1  Cutaneous abscess, unspecified site  Wound culture and Gram stain    sulfamethoxazole-trimethoprim (BACTRIM DS) 800-160 mg per tablet       Patient Instructions     Patient Instructions     Use warm moist compresses 3 times daily for 15-20 minutes  Start antibiotic intake as directed  Follow-up with surgeon as scheduled  Chief Complaint     Chief Complaint   Patient presents with    Abscess     abscess on left hip burst 1 hr ago       History of Present Illness    38-year-old male here with complaints of systems on his left hip area that burst   States that he saw his PCP little bit ago for it and they recommended he follow-up with the surgeon  He was not placed on the antibiotic  Area is tender, red  Today it opened up and burst     Purulenct discharge came from the area along with blood  Review of Systems   Review of Systems   Constitutional: Negative for chills and fever  Respiratory: Negative for cough and shortness of breath  Cardiovascular: Negative for chest pain  Skin: Positive for wound         Current Medications     Current Outpatient Medications:     multivitamin (THERAGRAN) TABS, Take 1 tablet by mouth daily, Disp: , Rfl:     sulfamethoxazole-trimethoprim (BACTRIM DS) 800-160 mg per tablet, Take 1 tablet by mouth every 12 (twelve) hours for 7 days, Disp: 14 tablet, Rfl: 0    Vitamin D, Ergocalciferol, 2000 units CAPS, Take 2,000 Units by mouth daily, Disp: , Rfl:     Current Allergies     Allergies as of 2021 - Reviewed 2021   Allergen Reaction Noted    Nsaids  2019          The following portions of the patient's history were reviewed and updated as appropriate: allergies, current medications, past family history, past medical history, past social history, past surgical history and problem list    Past Medical History:   Diagnosis Date    Allergy to environmental factors     As a child patient developed a rash from bleach    Asthma     States weight induced and no medications-no current issues since bypass surgery    Bariatric surgery status     Full dentures     Edentulous- only wears top denture    Headache     Migraine     Morbid [address] [address]     Postsurgical malabsorption     Rash     Has rash that now has scabs from itching  States it was biopsied and was told it was an allergic reaction possibly to detergent    Wears glasses      Past Surgical History:   Procedure Laterality Date    DENTAL SURGERY      ESOPHAGOGASTRODUODENOSCOPY N/A 6/20/2017    Procedure: ESOPHAGOGASTRODUODENOSCOPY (EGD); Surgeon: Pepe Villa MD;  [facility];  Service: Bariatrics    GASTRIC BYPASS  06/2017    gastric sleeve    [place] EGD TRANSORAL BIOPSY SINGLE/MULTIPLE N/A 5/3/2017    Procedure: ESOPHAGOGASTRODUODENOSCOPY (EGD) with bx;  Surgeon: Pepe Villa MD;  [facility]; Service: Bariatrics    [place] LAP, ROSALES RESTRICT PROC, LONGITUDINAL GASTRECTOMY N/A 6/20/2017    Procedure: GASTRECTOMY SLEEVE LAPAROSCOPIC;  Surgeon: Pepe Villa MD;  [facility];  Service: Farooq Cline REPAIR/GRAFT FLEX FOOT TENDON Right 3/27/2019    Procedure: REPAIR TENDON FOOT, Peroneal tendon exploration with possible repair versus tenodesis;   Surgeon: Cullen López MD;  [facility];  Service: Orthopedics    TONSILLECTOMY       Family History   Problem Relation Age of Onset    No Known Problems Mother     Obesity Father     Heart disease Father     Diabetes Father      Social History     Socioeconomic History    Marital status: Single     Spouse name: Not on file    Number of children: Not on file    Years of education: Not on file    Highest education level: Not on file   Occupational History    Not on file   Social Needs    Financial resource strain: Not on file    Food insecurity     Worry: Not on file     Inability: Not on file    Transportation needs     Medical: Not on file     Non-medical: Not on file   Tobacco Use    Smoking status: Former Smoker     Years: 10 00     Types: Cigarettes, Cigars     Quit date: 2016     Years since quittin 4    Smokeless tobacco: Never Used   Substance and Sexual Activity    Alcohol use: Yes     Frequency: 2-4 times a month     Drinks per session: 1 or 2     Comment: SOCIAL    Drug use: Yes     Types: Marijuana     Comment: social    Sexual activity: Not on file   Lifestyle    Physical activity     Days per week: 0 days     Minutes per session: 0 min    Stress: Not at all   Relationships    Social connections     Talks on phone: Not on file     Gets together: Not on file     Attends Buddhist service: Not on file     Active member of club or organization: Not on file     Attends meetings of clubs or organizations: Not on file     Relationship status: Not on file    Intimate partner violence     Fear of current or ex partner: Not on file     Emotionally abused: Not on file     Physically abused: Not on file     Forced sexual activity: Not on file   Other Topics Concern    Not on file   Social History Narrative    Not on file     Medications have been verified  Objective   /80   Pulse 89   Temp 98 3 °F (36 8 °C) (Temporal)   Resp 20   Ht 5' 10" (1 778 m)   Wt 134 kg (296 lb)   SpO2 98%   BMI 42 47 kg/m²      Physical Exam   Physical Exam  Vitals signs and nursing note reviewed  Constitutional:       Appearance: Normal appearance  Neck:      Musculoskeletal: Normal range of motion  Cardiovascular:      Rate and Rhythm: Normal rate and regular rhythm  Pulses: Normal pulses  Heart sounds: Normal heart sounds  Skin:         Neurological:      Mental Status: He is alert

## 2021-05-21 NOTE — PATIENT INSTRUCTIONS
Use warm moist compresses 3 times daily for 15-20 minutes  Start antibiotic intake as directed  Follow-up with surgeon as scheduled

## 2021-05-24 LAB
BACTERIA WND AEROBE CULT: ABNORMAL
BACTERIA WND AEROBE CULT: ABNORMAL
GRAM STN SPEC: ABNORMAL

## 2021-05-27 ENCOUNTER — TELEPHONE (OUTPATIENT)
Dept: SURGERY | Facility: CLINIC | Age: 36
End: 2021-05-27

## 2021-05-27 NOTE — TELEPHONE ENCOUNTER
[employer] ppo  No referral required  In network  Active as of:    Was on hold too long  Unable to get a rep to tell me as of date  Automated systme would not release that info      As per Epic, ins has been e-verified as active

## 2021-06-26 NOTE — PROGRESS NOTES
Assessment  1  Obesity (BMI 30-39 9) (278 00) (E66 9)   2  Status post laparoscopic sleeve gastrectomy (V45 86) (Z98 84)   3  Encounter for preventive health examination (V70 0) (Z00 00)   4  Postgastrectomy malabsorption (579 3) (K91 2,Z90 3)    Plan  Health Maintenance, Obesity (BMI 30-39 9), Postgastrectomy malabsorption, Status post  laparoscopic sleeve gastrectomy    · (1) FERRITIN; Status:Active; Requested ESTEBAN:[id number];    Perform:[facility]; OUI:[id number]; Ordered;For:Health Maintenance, Obesity (BMI 30-39 9), Postgastrectomy malabsorption, Status post laparoscopic sleeve gastrectomy; Ordered By:China Alfaro;   · (1) FOLATE; Status:Active; Requested GSI:[id number];    Perform:[facility]; ZPZ:[id number]; Ordered;For:Health Maintenance, Obesity (BMI 30-39 9), Postgastrectomy malabsorption, Status post laparoscopic sleeve gastrectomy; Ordered By:China Alfaro;   · (1) VITAMIN B12; Status:Active; Requested OVD:[id number];    Perform:[facility]; [id number]; Ordered;For:Health Maintenance, Obesity (BMI 30-39 9), Postgastrectomy malabsorption, Status post laparoscopic sleeve gastrectomy; Ordered By:Ilya Alfaro; Health Maintenance, Obesity (BMI 30-39 9), Status post laparoscopic sleeve gastrectomy    · (1) CBC/ PLT (NO DIFF); Status:Active; Requested ACV:[id number];    Perform:[facility]; PJJ:[id number]; Ordered;For:Health Maintenance, Obesity (BMI 30-39 9), Status post laparoscopic sleeve gastrectomy; Ordered By:China Alfaro;   · (1) COMPREHENSIVE METABOLIC PANEL; Status:Active; Requested FCQ:[id number];    Perform:[facility]; DUJ:[id number]; Ordered;For:Health Maintenance, Obesity (BMI 30-39 9), Status post laparoscopic sleeve gastrectomy; Ordered By:China Alfaro;   · (1) LIPID PANEL, FASTING; Status:Active; Requested JLL:[id number];    Perform:[facility]; [facility]:[id number]; Ordered;For:Health Maintenance, Obesity (BMI 30-39 9), I did not noticed that this was a duplicate encounter.Medication has already been picked up.   Status post laparoscopic sleeve gastrectomy; Ordered By:China Alfaro;   · (1) PTH N-TERMINAL (INTACT); Status:Active; Requested XRP:[id number];    Perform:[facility]; YNQ:[id number]; Ordered;For:Health Maintenance, Obesity (BMI 30-39 9), Status post laparoscopic sleeve gastrectomy; Ordered By:China Alfaro;   · (1) VITAMIN A; Status:Active; Requested SILVANO:[id number];    Perform:[facility]; TZT:[id number]; Ordered;For:Health Maintenance, Obesity (BMI 30-39 9), Status post laparoscopic sleeve gastrectomy; Ordered By:China Alfaro;   · (1) VITAMIN B1, WHOLE BLOOD; Status:Active; Requested UBN:[id number];    Perform:[facility]; ZJM:[id number]; Ordered;For:Health Maintenance, Obesity (BMI 30-39 9), Status post laparoscopic sleeve gastrectomy; Ordered By:China Alfaro;   · (1) VITAMIN D 25-HYDROXY; Status:Active; Requested ENC:[id number];    Perform:[facility]; ZKY:[id number]; Ordered;For:Health Maintenance, Obesity (BMI 30-39 9), Status post laparoscopic sleeve gastrectomy; Ordered By:China Alfaro;    Discussion/Summary    Follow up in 3 months  Follow diet as discussed  Get lab work done prior to your next office visit  Follow vitamin/mineral recommendations as reviewed with you  Exercise as tolerated  our office if you have any problems with abdominal pain especially if associated with fever, chills, nausea, vomiting, or any other concerns  lap sleeve gastrectomy-/obesity/bmi 38year old male Status post laparoscopic sleeve gastrectomy by Dr Karlee Okeefe 6/20/217   was mildly concerned with Right upper incision site-notes scant ooze from site-on exam incision site is currently scabbed over-no evidence of infection or swelling  Advised patient to keep area clean and dry-can use dry gauze over it as needed, but if able should keep open to air  It appears to be healing appropriately just slower than other incision sites  can call us if no improvement or any worsening in the next couple of weeks but I reassured him it looks o k   is here for routine follow-up  tolerating a regular dieteating at least 70 grams of protein per day30/60 minute rule with liquidsat least 64 ounces of fluid per dayexercising regularly has lost 49% excess body weight loss which is above average for this time frame  Pt is at risk for vitamin and mineral deficiencies secondary to the malabsorption and restriction of intakes  I reviewed patient's current vitamin and mineral intakes and advised on supplements  is taking 3 bariatric fusion and one calcium- encouraged him to increase to 4 bariatric fusion- he is due for routine labs now- provided with lab slip for this now  The patient has the current Goals: Continued weight loss with good nutrition intakesvitamin/mineral levelsas toleratedThe patent has the current Barriers: None identified  Patient is able to Self-Care  Educational resources provided: N/a  Possible side effects of new medications were reviewed with the patient/guardian today  The treatment plan was reviewed with the patient/guardian  The patient/guardian understands and agrees with the treatment plan   He was advised to follow up due to malabsorption risks  Chief Complaint  Patient in office today for 2nd post op visit  He is walking and taking vitamins  Post-Op  Post-Op Bariatric Surgery:   Laura Villagran is status post lap sleeve procedure,-performed on 6/20/2017-  by Dr Sandra Ivey  HPI: today's weight is 255 5 lb pounds,-today's BMI is 37-and-his total weight loss is 49% excess body weight loss pounds  The patient reports no nausea,-no vomiting,-no diarrhea,-no chest pain-and-no abdominal pain  Diet and Exercise: Diet history reviewed and discussed with the patient  Weight loss/gains to date discussed with the patient  Supplements: multivitamins-and-calcium  PE:   Abdominal exam: soft-and-no incisional hernia  Assessment:   Post-op, the patient is doing well  Plan:   Activity restrictions: None  Instructions / Recommendations: recommended a daily protein intake of  grams,-vitamin supplement(s) recommended,-mineral supplement(s) recommended,-recommended exercising at least 150 minutes per week,-diet as discussed-and-instructed to call the office for concerns, questions, or problems  The patient was instructed to follow up in 3 months  Obesity (Follow-Up): The patient is being seen for follow-up of obesity  The patient reports doing well  He presented with excess weight  Interval symptoms:  denies poor eating habits,-denies depressed mood,-denies anxiety,-denies dyspnea-and-denies fatigue  Associated symptoms: see discussion  Medications include vitamin supplements  Medications:  the patient is adherent to his medication regimen  Disease management:  the patient is doing well with his goals  Diet plan: low calorie diet  The patient is following the diet successfully  Due for: lipid profile and FBS and routine labs  Obesity (Brief): The patient is being seen for a routine clinic follow-up of obesity  Symptoms:  excess weight  Associated symptoms:  no dyspnea,-no fatigue-and-no anxiety  Current treatment includes low calorie diet  By report, there is good compliance with treatment, good tolerance of treatment and good symptom control  There are no known disease complications  Reported interest in weight loss is high  Diets tried in the past include low calorie  Weight pattern includes see discussion  Pertinent medical history:  refer to previous record  Previous presentation included excess weight  Review of Systems    Constitutional: planned weight loss, but-not feeling poorly  Cardiovascular: no chest pain-and-no palpitations  Respiratory: no shortness of breath-and-no wheezing  Gastrointestinal: no abdominal pain,-no nausea,-no vomiting,-no constipation-and-no diarrhea  Psychiatric: no anxiety-and-no depression        Active Problems  1  Morbid obesity (278 01) (E66 01)   2  Obesity (BMI 30-39 9) (278 00) (E66 9)   3  Postgastrectomy malabsorption (579 3) (K91 2,Z90 3)   4  Preop cardiovascular exam (V72 81) (Z01 810)   5  Status post laparoscopic sleeve gastrectomy (V45 86) (Z98 84)    Social History   · Denied: History of Drug use   · Never a smoker   · Occasional alcohol use  The social history was reviewed and updated today  Current Meds   1  Bariatric Fusion Oral Tablet Chewable; Therapy: (Gris Crawley) to Recorded   2  Calcium CHEW;   Therapy: (rGis Crawley) to Recorded   3  Omeprazole 20 MG Oral Capsule Delayed Release; take 1 capsule daily; Therapy: 98ONO9330 to (Evaluate:78Dvs8667)  Requested for: 74SEJ8909; Last   Rx:01Jun2017 Ordered    The medication list was reviewed and updated today  Allergies  1  No Known Drug Allergies    Vitals   Recorded: 27IJU9448 03:23PM   Temperature 98 F   Heart Rate 80   Respiration 18   Systolic 325   Diastolic 66   Height 5 ft 9 75 in   Weight 255 lb 8 0 oz   BMI Calculated 36 93   BSA Calculated 2 31     Physical Exam    Constitutional   General appearance: No acute distress, well appearing and well nourished  Eyes bilateral conjunctiva without pallor  Ears, Nose, Mouth, and Throat mucous membranes moist    Pulmonary   Respiratory effort: No increased work of breathing or signs of respiratory distress  Auscultation of lungs: Clear to auscultation, equal breath sounds bilaterally, no wheezes, no rales, no rhonci  Cardiovascular   Auscultation of heart: Normal rate and rhythm, normal S1 and S2, without murmurs  Abdomen soft, no incisional hernias appreciated     Musculoskeletal   Gait and station: Normal     Psychiatric   Orientation to person, place and time: Normal     Mood and affect: Normal          Future Appointments    Date/Time Provider Specialty Site   01/04/2018 02:30 PM Kel Alfaro [address] Signatures   Electronically signed by : Enid Casillas, [facility]; Sep 29 2017  3:50PM EST                       (Author)    Electronically signed by : TRU Castillo ; Oct  2 2017  8:46AM EST                       (Co-author)    Electronically signed by : TRU Castillo ; Oct  2 2017  8:46AM EST                       (Co-author)

## 2021-12-31 ENCOUNTER — HOSPITAL ENCOUNTER (EMERGENCY)
Facility: HOSPITAL | Age: 36
Discharge: HOME | End: 2021-12-31
Attending: EMERGENCY MEDICINE
Payer: COMMERCIAL

## 2021-12-31 VITALS
OXYGEN SATURATION: 97 % | SYSTOLIC BLOOD PRESSURE: 131 MMHG | TEMPERATURE: 100.3 F | HEART RATE: 102 BPM | DIASTOLIC BLOOD PRESSURE: 85 MMHG | RESPIRATION RATE: 18 BRPM

## 2021-12-31 DIAGNOSIS — R50.9 FEVER: ICD-10-CM

## 2021-12-31 DIAGNOSIS — R68.89 FLU-LIKE SYMPTOMS: ICD-10-CM

## 2021-12-31 DIAGNOSIS — U07.1 COVID-19 VIRUS INFECTION: Primary | ICD-10-CM

## 2021-12-31 LAB
FLUAV RNA RESP QL NAA+PROBE: NEGATIVE
FLUBV RNA RESP QL NAA+PROBE: NEGATIVE
RSV RNA RESP QL NAA+PROBE: NEGATIVE
SARS-COV-2 RNA RESP QL NAA+PROBE: POSITIVE

## 2021-12-31 PROCEDURE — 0241U HB NFCT DS VIR RESP RNA 4 TRGT: CPT | Performed by: EMERGENCY MEDICINE

## 2021-12-31 PROCEDURE — 99283 EMERGENCY DEPT VISIT LOW MDM: CPT

## 2021-12-31 PROCEDURE — 99284 EMERGENCY DEPT VISIT MOD MDM: CPT | Performed by: EMERGENCY MEDICINE

## 2022-01-01 NOTE — ED PROVIDER NOTES
History  Chief Complaint   Patient presents with    Fever - 9 weeks to 74 years     pt c/o fever of 104, headaches, and chills that started yesterday     HPI    Prior to Admission Medications   Prescriptions Last Dose Informant Patient Reported? Taking? Vitamin D, Ergocalciferol, 2000 units CAPS  Self Yes No   Sig: Take 2,000 Units by mouth daily   multivitamin (THERAGRAN) TABS  Self Yes No   Sig: Take 1 tablet by mouth daily      Facility-Administered Medications: None       Past Medical History:   Diagnosis Date    Allergy to environmental factors     As a child patient developed a rash from bleach    Asthma     States weight induced and no medications-no current issues since bypass surgery    Bariatric surgery status     Full dentures     Edentulous- only wears top denture    Headache     Migraine     Morbid obesity ([facility] [address] )     Postsurgical malabsorption     Rash     Has rash that now has scabs from itching  States it was biopsied and was told it was an allergic reaction possibly to detergent    Wears glasses        Past Surgical History:   Procedure Laterality Date    DENTAL SURGERY      ESOPHAGOGASTRODUODENOSCOPY N/A 6/20/2017    Procedure: ESOPHAGOGASTRODUODENOSCOPY (EGD); Surgeon: Althea Torres MD;  Location: [facility];  Service: Bariatrics    GASTRIC BYPASS  06/2017    gastric sleeve    IN EGD TRANSORAL BIOPSY SINGLE/MULTIPLE N/A 5/3/2017    Procedure: ESOPHAGOGASTRODUODENOSCOPY (EGD) with bx;  Surgeon: Althea Torres MD;  Location: [facility]; Service: Bariatrics    IN LAP, ROSALSE RESTRICT PROC, LONGITUDINAL GASTRECTOMY N/A 6/20/2017    Procedure: GASTRECTOMY SLEEVE LAPAROSCOPIC;  Surgeon: Althea Torres MD;  Location: [facility];  Service: [place] REPAIR/GRAFT FLEX FOOT TENDON Right 3/27/2019    Procedure: REPAIR TENDON FOOT, Peroneal tendon exploration with possible repair versus tenodesis;   Surgeon: Herrera Pinedo MD;  [facility];  Service: Orthopedics   [facility] Call TONSILLECTOMY         Family History   Problem Relation Age of Onset    No Known Problems Mother     Obesity Father     Heart disease Father     Diabetes Father      I have reviewed and agree with the history as documented  E-Cigarette/Vaping    E-Cigarette Use Never User      E-Cigarette/Vaping Substances    Nicotine No     THC No     CBD No     Flavoring No     Other No     Unknown No      Social History     Tobacco Use    Smoking status: Former Smoker     Years: 10 00     Types: Cigarettes, Cigars     Quit date: 2016     Years since quittin 0    Smokeless tobacco: Never Used   Vaping Use    Vaping Use: Never used   Substance Use Topics    Alcohol use: Yes     Comment: SOCIAL    Drug use: Yes     Types: Marijuana     Comment: social       Review of Systems    Physical Exam  Physical Exam  Vitals and nursing note reviewed  Constitutional:       General: He is not in acute distress  Appearance: He is well-developed  He is obese  Comments: febrile   HENT:      Head: Normocephalic and atraumatic  Eyes:      Conjunctiva/sclera: Conjunctivae normal       Pupils: Pupils are equal, round, and reactive to light  Neck:      Trachea: No tracheal deviation  Cardiovascular:      Rate and Rhythm: Regular rhythm  Tachycardia present  Heart sounds: Normal heart sounds  Pulmonary:      Effort: Pulmonary effort is normal  No respiratory distress  Breath sounds: Normal breath sounds  Abdominal:      General: There is no distension  Palpations: Abdomen is soft  Tenderness: There is no abdominal tenderness  Musculoskeletal:      Cervical back: Normal range of motion  Lymphadenopathy:      Cervical: No cervical adenopathy  Skin:     General: Skin is warm and dry  Neurological:      Mental Status: He is alert and oriented to person, place, and time  GCS: GCS eye subscore is 4  GCS verbal subscore is 5  GCS motor subscore is 6     Psychiatric: Behavior: Behavior normal          Vital Signs  ED Triage Vitals [12/31/21 1737]   Temperature Pulse Respirations Blood Pressure SpO2   (!) 101 7 °F (38 7 °C) 104 20 150/94 94 %      Temp Source Heart Rate Source Patient Position - Orthostatic VS BP Location FiO2 (%)   Oral Monitor Sitting Left arm --      Pain Score       --           Vitals:    12/31/21 1737 12/31/21 2122   BP: 150/94 131/85   Pulse: 104 102   Patient Position - Orthostatic VS: Sitting Sitting         Visual Acuity      ED Medications  Medications - No data to display    Diagnostic Studies  Results Reviewed     Procedure Component Value Units Date/Time    COVID/FLU/RSV [[id number]]  (Abnormal) Collected: 12/31/21 1739    Lab Status: Final result Specimen: Nares from Nose Updated: 12/31/21 1825     SARS-CoV-2 Positive     INFLUENZA A PCR Negative     INFLUENZA B PCR Negative     RSV PCR Negative    Narrative:      FOR PEDIATRIC PATIENTS - copy/paste COVID Guidelines URL to browser: [email or web address]/  [place]     This test has been authorized by FDA under an EUA (Emergency Use Assay) for use by authorized laboratories  Clinical caution and judgement should be used with the interpretation of these results with consideration of the clinical impression and other laboratory testing  Testing reported as "Positive" or "Negative" has been proven to be accurate according to standard laboratory validation requirements  All testing is performed with control materials showing appropriate reactivity at standard intervals  No orders to display              Procedures  Procedures         ED Course                                             MDM  Number of Diagnoses or Management Options  COVID-19 virus infection: new and requires workup  Fever: new and requires workup  Flu-like symptoms: new and requires workup  Diagnosis management comments:  This is a 49-year-old male who presented today with flu-like symptoms  He began getting sick yesterday  He endorses chills, body aches, fevers up to 104 3 at home  He does have mild headache  He denies significant coughing, any shortness of breath, chest pain, palpitations, nausea, vomiting, diarrhea  He has not been sick contacts  He did get vaccinated  He has been taking DayQuil for his symptoms  He says he is not supposed to take NSAIDs because of history of gastric bypass surgery  Review of systems:  Otherwise negative unless stated as above    He is well-appearing, no acute distress  He was febrile on arrival   O2 had rest was 94% that improved to 97% with ambulation  Exam is unremarkable  He is COVID positive on swab obtained in triage  Given lack of other significant symptoms I do not feel that he needs any further workup at this point in time  He is not hypoxic, and I am concerned for dehydration for which she would need admission to the hospital   I discussed with him management at home, follow up, and indications for return, and he expresses understanding of this plan  I did discuss isolation at home, and he has he works in home slipped on a work note  Amount and/or Complexity of Data Reviewed  Clinical lab tests: ordered and reviewed        Disposition  Final diagnoses:   COVID-19 virus infection   Flu-like symptoms   Fever     Time reflects when diagnosis was documented in both MDM as applicable and the Disposition within this note     Time User Action Codes Description Comment    12/31/2021  9:18 PM Tanvir Hinton Add [U07 1] COVID-19 virus infection     12/31/2021  9:18 PM Tanvir Hinton Add [R68 89] Flu-like symptoms     12/31/2021  9:18 PM Tanvir Hinton Add [R50 9] Fever       ED Disposition     ED Disposition Condition Date/Time Comment    Discharge Good Fri Dec 31, 2021  9:18 PM Allegra Coon discharge to home/self care        Follow-up Information     Follow up With Specialties Details Why Contact Info    Heidi Sumner, [address] Nurse Practitioner, Internal Medicine Schedule an appointment as soon as possible for a visit  to follow up on your symptoms [address] [facility] [place] [facility] [place]  [phone]            Patient's Medications   Discharge Prescriptions    No medications on file       Outpatient Discharge Orders   Pulse Oximeter       PDMP Review     None          ED Provider  Electronically Signed by           Haylee Guerrero MD  12/31/21 9848

## 2022-01-01 NOTE — DISCHARGE INSTRUCTIONS
Take over-the-counter cough and cold medications to help your symptoms  Follow the primary care doctor to make sure you are doing better  Stay home away from other people for minimum of seven days, and 24 hours fever free  Return if you develop persistent vomiting or unable to tolerate fluids, have severe diarrhea and feel like you are unable to drink enough and are concerned you are becoming dehydrated, have your oxygen levels drop below 90%, or for any other concerns

## 2022-01-02 ENCOUNTER — TELEPHONE (OUTPATIENT)
Dept: FAMILY MEDICINE CLINIC | Facility: CLINIC | Age: 37
End: 2022-01-02

## 2022-01-02 NOTE — TELEPHONE ENCOUNTER
Patient had a positive PCR Covid test result on: 12/31/21  Date of symptom onset: 12/30/21  Is the patient vaccinated: Yes  Does patient have a PCP with Demetrius Lockwood: no    Placed call to patient, left a message advising I was following up on the ED visit and positive Covid test result to see how the patient is feeling  Left my office direct line for a return call

## 2022-01-03 NOTE — TELEPHONE ENCOUNTER
Placed call to patient, he is feeling "1000 times better " Patient still presents with a cough and sore throat  Denies fever, SOB, CP  Patient has a PCP to follow up with

## 2022-05-06 ENCOUNTER — RA CDI HCC (OUTPATIENT)
Dept: OTHER | Facility: HOSPITAL | Age: 37
End: 2022-05-06

## 2022-05-06 NOTE — PROGRESS NOTES
[facility] [address]  coding opportunities          Chart Reviewed number of suggestions sent to Provider: 1   With the beginning of the new year, this is a reminder to address ALL [facility] [address]  (risk adjustment) codes for 2022 as patient scores reset to zero MIC    E66 01 Morbid (severe) obesity due to excess calories ([facility] [address] )    Patients Insurance        Commercial Insurance: [address] [place] [address]

## 2022-05-13 ENCOUNTER — OFFICE VISIT (OUTPATIENT)
Dept: INTERNAL MEDICINE CLINIC | Facility: CLINIC | Age: 37
End: 2022-05-13
Payer: COMMERCIAL

## 2022-05-13 ENCOUNTER — APPOINTMENT (OUTPATIENT)
Dept: LAB | Facility: CLINIC | Age: 37
End: 2022-05-13
Payer: COMMERCIAL

## 2022-05-13 VITALS
HEART RATE: 79 BPM | BODY MASS INDEX: 45.1 KG/M2 | OXYGEN SATURATION: 96 % | HEIGHT: 70 IN | TEMPERATURE: 97.6 F | DIASTOLIC BLOOD PRESSURE: 82 MMHG | WEIGHT: 315 LBS | RESPIRATION RATE: 18 BRPM | SYSTOLIC BLOOD PRESSURE: 126 MMHG

## 2022-05-13 DIAGNOSIS — Z11.4 ENCOUNTER FOR SCREENING FOR HIV: ICD-10-CM

## 2022-05-13 DIAGNOSIS — Z83.3 FAMILY HISTORY OF DIABETES MELLITUS: ICD-10-CM

## 2022-05-13 DIAGNOSIS — D50.9 IRON DEFICIENCY ANEMIA, UNSPECIFIED IRON DEFICIENCY ANEMIA TYPE: ICD-10-CM

## 2022-05-13 DIAGNOSIS — E66.01 MORBID OBESITY WITH BMI OF 45.0-49.9, ADULT (HCC): ICD-10-CM

## 2022-05-13 DIAGNOSIS — M25.571 ACUTE RIGHT ANKLE PAIN: ICD-10-CM

## 2022-05-13 DIAGNOSIS — Z00.00 ANNUAL PHYSICAL EXAM: ICD-10-CM

## 2022-05-13 DIAGNOSIS — M72.2 PLANTAR FASCIITIS: ICD-10-CM

## 2022-05-13 DIAGNOSIS — E66.01 MORBID (SEVERE) OBESITY DUE TO EXCESS CALORIES (HCC): ICD-10-CM

## 2022-05-13 DIAGNOSIS — Z90.3 POSTGASTRECTOMY MALABSORPTION: ICD-10-CM

## 2022-05-13 DIAGNOSIS — K91.2 POSTGASTRECTOMY MALABSORPTION: ICD-10-CM

## 2022-05-13 DIAGNOSIS — Z11.59 ENCOUNTER FOR HEPATITIS C SCREENING TEST FOR LOW RISK PATIENT: ICD-10-CM

## 2022-05-13 DIAGNOSIS — Z90.3 POSTGASTRECTOMY MALABSORPTION: Primary | ICD-10-CM

## 2022-05-13 DIAGNOSIS — E55.9 VITAMIN D INSUFFICIENCY: ICD-10-CM

## 2022-05-13 DIAGNOSIS — G89.29 CHRONIC PAIN OF RIGHT ANKLE: ICD-10-CM

## 2022-05-13 DIAGNOSIS — M25.571 CHRONIC PAIN OF RIGHT ANKLE: ICD-10-CM

## 2022-05-13 DIAGNOSIS — K91.2 POSTGASTRECTOMY MALABSORPTION: Primary | ICD-10-CM

## 2022-05-13 DIAGNOSIS — M25.562 ACUTE PAIN OF LEFT KNEE: ICD-10-CM

## 2022-05-13 LAB
25(OH)D3 SERPL-MCNC: 25.5 NG/ML (ref 30–100)
ALBUMIN SERPL BCP-MCNC: 3.3 G/DL (ref 3.5–5)
ALP SERPL-CCNC: 91 U/L (ref 46–116)
ALT SERPL W P-5'-P-CCNC: 44 U/L (ref 12–78)
ANION GAP SERPL CALCULATED.3IONS-SCNC: 1 MMOL/L (ref 4–13)
AST SERPL W P-5'-P-CCNC: 23 U/L (ref 5–45)
BASOPHILS # BLD AUTO: 0.04 THOUSANDS/ΜL (ref 0–0.1)
BASOPHILS NFR BLD AUTO: 1 % (ref 0–1)
BILIRUB SERPL-MCNC: 0.34 MG/DL (ref 0.2–1)
BUN SERPL-MCNC: 8 MG/DL (ref 5–25)
CALCIUM ALBUM COR SERPL-MCNC: 9.8 MG/DL (ref 8.3–10.1)
CALCIUM SERPL-MCNC: 9.2 MG/DL (ref 8.3–10.1)
CHLORIDE SERPL-SCNC: 104 MMOL/L (ref 100–108)
CHOLEST SERPL-MCNC: 192 MG/DL
CO2 SERPL-SCNC: 33 MMOL/L (ref 21–32)
CREAT SERPL-MCNC: 0.87 MG/DL (ref 0.6–1.3)
EOSINOPHIL # BLD AUTO: 0.23 THOUSAND/ΜL (ref 0–0.61)
EOSINOPHIL NFR BLD AUTO: 3 % (ref 0–6)
ERYTHROCYTE [DISTWIDTH] IN BLOOD BY AUTOMATED COUNT: 12.8 % (ref 11.6–15.1)
EST. AVERAGE GLUCOSE BLD GHB EST-MCNC: 103 MG/DL
FERRITIN SERPL-MCNC: 252 NG/ML (ref 8–388)
GFR SERPL CREATININE-BSD FRML MDRD: 108 ML/MIN/1.73SQ M
GLUCOSE SERPL-MCNC: 90 MG/DL (ref 65–140)
HBA1C MFR BLD: 5.2 %
HCT VFR BLD AUTO: 48.6 % (ref 36.5–49.3)
HCV AB SER QL: NORMAL
HDLC SERPL-MCNC: 33 MG/DL
HGB BLD-MCNC: 15.4 G/DL (ref 12–17)
IMM GRANULOCYTES # BLD AUTO: 0.02 THOUSAND/UL (ref 0–0.2)
IMM GRANULOCYTES NFR BLD AUTO: 0 % (ref 0–2)
IRON SATN MFR SERPL: 19 % (ref 20–50)
IRON SERPL-MCNC: 57 UG/DL (ref 65–175)
LDLC SERPL CALC-MCNC: 81 MG/DL (ref 0–100)
LYMPHOCYTES # BLD AUTO: 2.52 THOUSANDS/ΜL (ref 0.6–4.47)
LYMPHOCYTES NFR BLD AUTO: 32 % (ref 14–44)
MCH RBC QN AUTO: 28.8 PG (ref 26.8–34.3)
MCHC RBC AUTO-ENTMCNC: 31.7 G/DL (ref 31.4–37.4)
MCV RBC AUTO: 91 FL (ref 82–98)
MONOCYTES # BLD AUTO: 0.69 THOUSAND/ΜL (ref 0.17–1.22)
MONOCYTES NFR BLD AUTO: 9 % (ref 4–12)
NEUTROPHILS # BLD AUTO: 4.39 THOUSANDS/ΜL (ref 1.85–7.62)
NEUTS SEG NFR BLD AUTO: 55 % (ref 43–75)
NRBC BLD AUTO-RTO: 0 /100 WBCS
PLATELET # BLD AUTO: 170 THOUSANDS/UL (ref 149–390)
PMV BLD AUTO: 11.2 FL (ref 8.9–12.7)
POTASSIUM SERPL-SCNC: 4 MMOL/L (ref 3.5–5.3)
PROT SERPL-MCNC: 7.3 G/DL (ref 6.4–8.2)
RBC # BLD AUTO: 5.35 MILLION/UL (ref 3.88–5.62)
SODIUM SERPL-SCNC: 138 MMOL/L (ref 136–145)
TIBC SERPL-MCNC: 298 UG/DL (ref 250–450)
TRIGL SERPL-MCNC: 388 MG/DL
WBC # BLD AUTO: 7.89 THOUSAND/UL (ref 4.31–10.16)

## 2022-05-13 PROCEDURE — 86803 HEPATITIS C AB TEST: CPT

## 2022-05-13 PROCEDURE — 82728 ASSAY OF FERRITIN: CPT

## 2022-05-13 PROCEDURE — 80053 COMPREHEN METABOLIC PANEL: CPT

## 2022-05-13 PROCEDURE — 87389 HIV-1 AG W/HIV-1&-2 AB AG IA: CPT

## 2022-05-13 PROCEDURE — 82306 VITAMIN D 25 HYDROXY: CPT

## 2022-05-13 PROCEDURE — 83550 IRON BINDING TEST: CPT

## 2022-05-13 PROCEDURE — 3725F SCREEN DEPRESSION PERFORMED: CPT | Performed by: NURSE PRACTITIONER

## 2022-05-13 PROCEDURE — 83540 ASSAY OF IRON: CPT

## 2022-05-13 PROCEDURE — 85025 COMPLETE CBC W/AUTO DIFF WBC: CPT

## 2022-05-13 PROCEDURE — 36415 COLL VENOUS BLD VENIPUNCTURE: CPT

## 2022-05-13 PROCEDURE — 80061 LIPID PANEL: CPT

## 2022-05-13 PROCEDURE — 83036 HEMOGLOBIN GLYCOSYLATED A1C: CPT

## 2022-05-13 PROCEDURE — 3008F BODY MASS INDEX DOCD: CPT | Performed by: NURSE PRACTITIONER

## 2022-05-13 PROCEDURE — 99395 PREV VISIT EST AGE 18-39: CPT | Performed by: NURSE PRACTITIONER

## 2022-05-13 NOTE — PROGRESS NOTES
ADULT ANNUAL PHYSICAL  [address]    NAME: Leeann Quiles  AGE: 42 y o  SEX: male  : 1985     DATE: 2022     Assessment and Plan:     Problem List Items Addressed This Visit        Digestive    Postgastrectomy malabsorption - Primary       Continued follow-up with Geriatrics  The patient had a gastric sleeve approximately 6-7 years ago           Relevant Orders    Comprehensive metabolic panel    CBC and differential       Other    Morbid (severe) obesity due to excess calories [facility])      Patient had a gastric sleeve procedure performed approximately 6-7 years ago  The patient's highest weight was 360 lb  He did get down to approximately 220 lb but has since gained weight back down and his current weight is 321 lb  The patient admits to following a poor diet  He does not perform any formal exercise  He continues to follow with Bariatric  Blood work has been ordered for surveillance for the patient  Vitamin D insufficiency      Vitamin-D level ordered  Relevant Orders    Vitamin D 25 hydroxy    Chronic pain of right ankle       The patient presents with continued pain of his right foot and ankle  This was chronic in nature  He did have surgery on his right ankle several years ago  An MRI performed in 2019 does show plantar fasciitis  Patient has been referred to Podiatry  An x-ray of the foot has been ordered             Iron deficiency anemia    Relevant Orders    Iron Panel (Includes Ferritin, Iron Sat%, Iron, and TIBC)    Lipid Panel with Direct LDL reflex    Family history of diabetes mellitus    Relevant Orders    HEMOGLOBIN A1C W/ EAG ESTIMATION      Other Visit Diagnoses     Encounter for screening for HIV        Relevant Orders    HIV 1/2 Antigen/Antibody (4th Generation) w Reflex [facility]    Encounter for hepatitis C screening test for low risk patient        Relevant Orders    Hepatitis C antibody Annual physical exam        Morbid obesity with BMI of 45 0-49 9, adult ([facility] [address] )        Relevant Orders    HEMOGLOBIN A1C W/ EAG ESTIMATION    Plantar fasciitis        Relevant Orders    Ambulatory Referral to Podiatry    XR foot 3+ vw right    Acute pain of left knee        Relevant Orders    XR knee 3 vw left non injury    Acute right ankle pain        Relevant Orders    XR ankle 3+ vw right          Immunizations and preventive care screenings were discussed with patient today  Appropriate education was printed on patient's after visit summary  Counseling:  Alcohol/drug use: discussed moderation in alcohol intake, the recommendations for healthy alcohol use, and avoidance of illicit drug use  Dental Health: discussed importance of regular tooth brushing, flossing, and dental visits  Injury prevention: discussed safety/seat belts, safety helmets, smoke detectors, carbon dioxide detectors, and smoking near bedding or upholstery  Sexual health: discussed sexually transmitted diseases, partner selection, use of condoms, avoidance of unintended pregnancy, and contraceptive alternatives  · Exercise: the importance of regular exercise/physical activity was discussed  Recommend exercise 3-5 times per week for at least 30 minutes  BMI Counseling: Body mass index is 46 06 kg/m²  The BMI is above normal  Nutrition recommendations include decreasing portion sizes, encouraging healthy choices of fruits and vegetables, decreasing fast food intake, consuming healthier snacks, limiting drinks that contain sugar, moderation in carbohydrate intake, increasing intake of lean protein, reducing intake of saturated and trans fat and reducing intake of cholesterol  Exercise recommendations include moderate physical activity 150 minutes/week and exercising 3-5 times per week  Rationale for BMI follow-up plan is due to patient being overweight or obese       Depression Screening and Follow-up Plan: Patient was screened for depression during today's encounter  They screened negative with a PHQ-2 score of 0  Return in about 1 year (around 5/13/2023) for yearly physical exam      Chief Complaint:     Chief Complaint   Patient presents with    annual exam      History of Present Illness:     Adult Annual Physical   Patient here for a comprehensive physical exam  The patient reports no problems  Diet and Physical Activity  · Diet/Nutrition: well balanced diet, frequent junk food, high fat diet and limited fruits/vegetables  · Exercise: no formal exercise  Depression Screening  PHQ-2/9 Depression Screening    Little interest or pleasure in doing things: 0 - not at all  Feeling down, depressed, or hopeless: 0 - not at all  PHQ-2 Score: 0  PHQ-2 Interpretation: Negative depression screen       General Health  · Sleep: sleeps well and gets more than 8 hours of sleep on average  · Hearing: normal - bilateral   · Vision: previous LASIK surgery  · Dental: dentures   Health  · History of STDs?: no      Review of Systems:     Review of Systems   Constitutional: Negative  Negative for fatigue  HENT: Negative  Negative for congestion, postnasal drip, rhinorrhea and trouble swallowing  Eyes: Negative  Negative for visual disturbance  Respiratory: Negative  Negative for choking and shortness of breath  Cardiovascular: Negative  Negative for chest pain  Gastrointestinal: Negative  Endocrine: Negative  Genitourinary: Negative  Musculoskeletal: Positive for arthralgias (right foot and ankle and left knee)  Negative for back pain, myalgias and neck pain  Skin: Negative  Neurological: Negative for dizziness and headaches  Psychiatric/Behavioral: Negative         Past Medical History:     Past Medical History:   Diagnosis Date    Allergy to environmental factors     As a child patient developed a rash from bleach    Asthma     States weight induced and no medications-no current issues since bypass surgery    Bariatric surgery status     Full dentures     Edentulous- only wears top denture    Headache     Migraine     Morbid obesity ([facility] [address] )     Obesity     Postsurgical malabsorption     Rash     Has rash that now has scabs from itching  States it was biopsied and was told it was an allergic reaction possibly to detergent    Wears glasses       Past Surgical History:     Past Surgical History:   Procedure Laterality Date    DENTAL SURGERY      ESOPHAGOGASTRODUODENOSCOPY N/A 2017    Procedure: ESOPHAGOGASTRODUODENOSCOPY (EGD); Surgeon: Bony Calvert MD;  Location: [facility];  Service: Bariatrics    GASTRIC BYPASS  2017    gastric sleeve    MO EGD TRANSORAL BIOPSY SINGLE/MULTIPLE N/A 5/3/2017    Procedure: ESOPHAGOGASTRODUODENOSCOPY (EGD) with bx;  Surgeon: Bony Calvert MD;  Location: [facility]; Service: Bariatrics    MO LAP, ROSALES RESTRICT PROC, LONGITUDINAL GASTRECTOMY N/A 2017    Procedure: GASTRECTOMY SLEEVE LAPAROSCOPIC;  Surgeon: Bony Calvert MD;  Location: [facility];  Service: Vikki Anderson REPAIR/GRAFT FLEX FOOT TENDON Right 3/27/2019    Procedure: REPAIR TENDON FOOT, Peroneal tendon exploration with possible repair versus tenodesis; Surgeon: Jono Girard MD;  [facility];  Service: Orthopedics    TONSILLECTOMY        Social History:     Social History     Socioeconomic History    Marital status: Single     Spouse name: None    Number of children: None    Years of education: None    Highest education level: None   Occupational History    None   Tobacco Use    Smoking status: Former Smoker     Packs/day: 0 25     Years: 10 00     Pack years: 2 50     Types: Cigarettes, Cigars     Quit date: 2016     Years since quittin 3    Smokeless tobacco: Never Used   Vaping Use    Vaping Use: Never used   Substance and Sexual Activity    Alcohol use:  Yes     Alcohol/week: 4 0 standard drinks     Types: 4 Standard drinks or equivalent per week     Comment: SOCIAL    Drug use: Yes     Types: Marijuana     Comment: social-limited    Sexual activity: Yes     Partners: Female   Other Topics Concern    None   Social History Narrative    None     Social Determinants of Health     Financial Resource Strain: Not on file   Food Insecurity: Not on file   Transportation Needs: Not on file   Physical Activity: Inactive    Days of Exercise per Week: 0 days    Minutes of Exercise per Session: 0 min   Stress: No Stress Concern Present    Feeling of Stress : Not at all   Social Connections: Not on file   Intimate Partner Violence: Not on file   Housing Stability: Not on file      Family History:     Family History   Problem Relation Age of Onset    Breast cancer Mother     Retinal detachment Mother     Cataracts Mother     Stroke Father     Obesity Father     Heart disease Father     Diabetes Father       Current Medications:     Current Outpatient Medications   Medication Sig Dispense Refill    multivitamin (THERAGRAN) TABS Take 1 tablet by mouth daily      Vitamin D, Ergocalciferol, 2000 units CAPS Take 2,000 Units by mouth daily       No current facility-administered medications for this visit  Allergies: Allergies   Allergen Reactions    Nsaids      gastric bypass       Physical Exam:     /82 (BP Location: Left arm, Patient Position: Sitting, Cuff Size: Standard)   Pulse 79   Temp 97 6 °F (36 4 °C) (Temporal) Comment: no nsaids  Resp 18   Ht 5' 10" (1 778 m)   Wt (!) 146 kg (321 lb)   SpO2 96%   BMI 46 06 kg/m²     Physical Exam  Vitals and nursing note reviewed  Constitutional:       Appearance: Normal appearance  He is well-developed  He is obese  HENT:      Head: Normocephalic and atraumatic  Right Ear: Tympanic membrane, ear canal and external ear normal  There is no impacted cerumen  Left Ear: Tympanic membrane, ear canal and external ear normal  There is no impacted cerumen        Nose: Nose normal       Mouth/Throat:      Mouth: Mucous membranes are moist       Dentition: Has dentures  Pharynx: Oropharynx is clear  Eyes:      Conjunctiva/sclera: Conjunctivae normal    Cardiovascular:      Rate and Rhythm: Normal rate and regular rhythm  Pulses: Normal pulses  Heart sounds: Normal heart sounds  No murmur heard  Pulmonary:      Effort: Pulmonary effort is normal  No respiratory distress  Breath sounds: Normal breath sounds  Abdominal:      General: Bowel sounds are normal  There is no distension  Palpations: Abdomen is soft  There is no mass  Tenderness: There is no abdominal tenderness  There is no guarding or rebound  Hernia: No hernia is present  Musculoskeletal:         General: Normal range of motion  Cervical back: Normal range of motion and neck supple  Skin:     General: Skin is warm and dry  Neurological:      General: No focal deficit present  Mental Status: He is alert and oriented to person, place, and time  Psychiatric:         Mood and Affect: Mood normal          Behavior: Behavior normal          Thought Content:  Thought content normal          Judgment: Judgment normal           Yoel Cleveland, [address]

## 2022-05-13 NOTE — ASSESSMENT & PLAN NOTE
The patient presents with continued pain of his right foot and ankle  This was chronic in nature  He did have surgery on his right ankle several years ago  An MRI performed in 2019 does show plantar fasciitis  Patient has been referred to Podiatry  An x-ray of the foot has been ordered

## 2022-05-13 NOTE — PATIENT INSTRUCTIONS
Plantar Fasciitis   AMBULATORY CARE:   Plantar fasciitis  PF is swelling of the plantar fascia  The plantar fascia is a thick band of tissue that connects your heel bone to your toes  This part of your foot helps support the arch of your foot and absorbs shock  Tension and stress can cause small tears on the thick band of tissue  These small tears can grow larger with repeated stretching and tearing  The band of tissue can become swollen and painful  Signs and symptoms:   Pain on the bottom of your foot near your heel    Pain that is worse right after you get out of bed or after a long period of rest    Pain after activity    Stiffness in your foot    Heel swelling    Call your doctor if:   Your pain or swelling suddenly increase  You develop knee, hip, or back pain  You have questions or concerns about your condition or care  Treatment  may include any of the following:  Medicines  may be given to decrease swelling and pain  Shoe inserts, splints, or tape  help support your foot and decrease stress on your plantar fascia  A night splint may help stretch your plantar fascia while you sleep  Stretches and exercises  can help decrease pain and swelling  They can also help strengthen the muscles that support your heel and foot  Surgery  may be needed when other treatments do not work  During surgery, the plantar fascia is  from your heel  Self-care:   Wear your splint or shoe inserts as directed  You may need to wear a splint at night to keep your foot stretched while you sleep  This will help prevent sharp pain first thing in the morning  Shoe inserts will help decrease stress on your plantar fascia when you walk or exercise  Apply ice on your plantar fascia  Ice helps prevent tissue damage and decreases swelling and pain  Fill a water bottle with water and freeze it  Wrap a towel around the bottle or cover it with a pillow case   Roll the water bottle under your foot for 10 minutes in the morning and after work  Massage your plantar fascia as directed  This may help decrease swelling and pain  Roll a golf ball under your foot for 10 minutes  Repeat 3 times each day  Go to physical therapy as directed  A physical therapist teaches you exercises to help improve movement and strength, and to decrease pain  Prevent plantar fasciitis:   Maintain a healthy weight  This will help decrease stress on your feet  Ask your healthcare provider how much you should weigh  Ask him to help you create a weight loss plan if you are overweight  Do low-impact exercises  Low-impact exercises decrease stress on your plantar fascia  Examples include swimming or bicycling  Start new activities slowly  Increase the intensity and time gradually  Wear shoes that fit well and support your arch  Replace your shoes before the padding or shock absorption wears out  Do not walk or  bare feet or sandals for long periods of time  Follow up with your doctor as directed:  Write down your questions so you remember to ask them during your visits  © [employer] 2022 Information is for End User's use only and may not be sold, redistributed or otherwise used for commercial purposes  All illustrations and images included in CareNotes® are the copyrighted property of A D A M , Inc  or [address] [place] [address]  The above information is an  only  It is not intended as medical advice for individual conditions or treatments  Talk to your doctor, nurse or pharmacist before following any medical regimen to see if it is safe and effective for you  Wellness Visit for Adults   AMBULATORY CARE:   A wellness visit  is when you see your healthcare provider to get screened for health problems  Your healthcare provider will also give you advice on how to stay healthy  Write down your questions so you remember to ask them   Ask your healthcare provider how often you should have a wellness visit  What happens at a wellness visit:  Your healthcare provider will ask about your health, and your family history of health problems  This includes high blood pressure, heart disease, and cancer  He or she will ask if you have symptoms that concern you, if you smoke, and about your mood  You may also be asked about your intake of medicines, supplements, food, and alcohol  Any of the following may be done: Your weight  will be checked  Your height may also be checked so your body mass index (BMI) can be calculated  Your BMI shows if you are at a healthy weight  Your blood pressure  and heart rate will be checked  Your temperature may also be checked  Blood and urine tests  may be done  Blood tests may be done to check your cholesterol levels  Abnormal cholesterol levels increase your risk for heart disease and stroke  You may also need a blood or urine test to check for diabetes if you are at increased risk  Urine tests may be done to look for signs of an infection or kidney disease  A physical exam  includes checking your heartbeat and lungs with a stethoscope  Your healthcare provider may also check your skin to look for sun damage  Screening tests  may be recommended  A screening test is done to check for diseases that may not cause symptoms  The screening tests you may need depend on your age, gender, family history, and lifestyle habits  For example, colorectal screening may be recommended if you are 48years old or older  Screening tests you need if you are a woman:   A Pap smear  is used to screen for cervical cancer  Pap smears are usually done every 3 to 5 years depending on your age  You may need them more often if you have had abnormal Pap smear test results in the past  Ask your healthcare provider how often you should have a Pap smear  A mammogram  is an x-ray of your breasts to screen for breast cancer   Experts recommend mammograms every 2 years starting at age 50 years  You may need a mammogram at age 52 years or younger if you have an increased risk for breast cancer  Talk to your healthcare provider about when you should start having mammograms and how often you need them  Vaccines you may need:   Get an influenza vaccine  every year  The influenza vaccine protects you from the flu  Several types of viruses cause the flu  The viruses change over time, so new vaccines are made each year  Get a tetanus-diphtheria (Td) booster vaccine  every 10 years  This vaccine protects you against tetanus and diphtheria  Tetanus is a severe infection that may cause painful muscle spasms and lockjaw  Diphtheria is a severe bacterial infection that causes a thick covering in the back of your mouth and throat  Get a human papillomavirus (HPV) vaccine  if you are female and aged 23 to 32 or male 23 to 24 and never received it  This vaccine protects you from HPV infection  HPV is the most common infection spread by sexual contact  HPV may also cause vaginal, penile, and anal cancers  Get a pneumococcal vaccine  if you are aged 72 years or older  The pneumococcal vaccine is an injection given to protect you from pneumococcal disease  Pneumococcal disease is an infection caused by pneumococcal bacteria  The infection may cause pneumonia, meningitis, or an ear infection  Get a shingles vaccine  if you are 60 or older, even if you have had shingles before  The shingles vaccine is an injection to protect you from the varicella-zoster virus  This is the same virus that causes chickenpox  Shingles is a painful rash that develops in people who had chickenpox or have been exposed to the virus  How to eat healthy:  My Plate is a model for planning healthy meals  It shows the types and amounts of foods that should go on your plate  Fruits and vegetables make up about half of your plate, and grains and protein make up the other half   A serving of dairy is included on the side of your plate  The amount of calories and serving sizes you need depends on your age, gender, weight, and height  Examples of healthy foods are listed below:  Eat a variety of vegetables  such as dark green, red, and orange vegetables  You can also include canned vegetables low in sodium (salt) and frozen vegetables without added butter or sauces  Eat a variety of fresh fruits , canned fruit in 100% juice, frozen fruit, and dried fruit  Include whole grains  At least half of the grains you eat should be whole grains  Examples include whole-wheat bread, wheat pasta, brown rice, and whole-grain cereals such as oatmeal     Eat a variety of protein foods such as seafood (fish and shellfish), lean meat, and poultry without skin (turkey and chicken)  Examples of lean meats include pork leg, shoulder, or tenderloin, and beef round, sirloin, tenderloin, and extra lean ground beef  Other protein foods include eggs and egg substitutes, beans, peas, soy products, nuts, and seeds  Choose low-fat dairy products such as skim or 1% milk or low-fat yogurt, cheese, and cottage cheese  Limit unhealthy fats  such as butter, hard margarine, and shortening  Exercise:  Exercise at least 30 minutes per day on most days of the week  Some examples of exercise include walking, biking, dancing, and swimming  You can also fit in more physical activity by taking the stairs instead of the elevator or parking farther away from stores  Include muscle strengthening activities 2 days each week  Regular exercise provides many health benefits  It helps you manage your weight, and decreases your risk for type 2 diabetes, heart disease, stroke, and high blood pressure  Exercise can also help improve your mood  Ask your healthcare provider about the best exercise plan for you  General health and safety guidelines:   Do not smoke  Nicotine and other chemicals in cigarettes and cigars can cause lung damage   Ask your healthcare provider for information if you currently smoke and need help to quit  E-cigarettes or smokeless tobacco still contain nicotine  Talk to your healthcare provider before you use these products  Limit alcohol  A drink of alcohol is 12 ounces of beer, 5 ounces of wine, or 1½ ounces of liquor  Lose weight, if needed  Being overweight increases your risk of certain health conditions  These include heart disease, high blood pressure, type 2 diabetes, and certain types of cancer  Protect your skin  Do not sunbathe or use tanning beds  Use sunscreen with a SPF 15 or higher  Apply sunscreen at least 15 minutes before you go outside  Reapply sunscreen every 2 hours  Wear protective clothing, hats, and sunglasses when you are outside  Drive safely  Always wear your seatbelt  Make sure everyone in your car wears a seatbelt  A seatbelt can save your life if you are in an accident  Do not use your cell phone when you are driving  This could distract you and cause an accident  Pull over if you need to make a call or send a text message  Practice safe sex  Use latex condoms if are sexually active and have more than one partner  Your healthcare provider may recommend screening tests for sexually transmitted infections (STIs)  Wear helmets, lifejackets, and protective gear  Always wear a helmet when you ride a bike or motorcycle, go skiing, or play sports that could cause a head injury  Wear protective equipment when you play sports  Wear a lifejacket when you are on a boat or doing water sports  © Copyright [employer] 2022 Information is for End User's use only and may not be sold, redistributed or otherwise used for commercial purposes  All illustrations and images included in CareNotes® are the copyrighted property of [employer]  or Bronson Cardenas   The above information is an  only  It is not intended as medical advice for individual conditions or treatments   Talk to your doctor, nurse or pharmacist before following any medical regimen to see if it is safe and effective for you  Weight Management   AMBULATORY CARE:   Why it is important to manage your weight:  Being overweight increases your risk of health conditions such as heart disease, high blood pressure, type 2 diabetes, and certain types of cancer  It can also increase your risk for osteoarthritis, sleep apnea, and other respiratory problems  Aim for a slow, steady weight loss  Even a small amount of weight loss can lower your risk of health problems  Risks of being overweight:  Extra weight can cause many health problems, including the following:  Diabetes (high blood sugar level)    High blood pressure or high cholesterol    Heart disease    Stroke    Gallbladder or liver disease    Cancer of the colon, breast, prostate, liver, or kidney    Sleep apnea    Arthritis or gout    Screening  is done to check for health conditions before you have signs or symptoms  If you are 28to 79years old, your blood sugar level may be checked every 3 years for signs of prediabetes or diabetes  Your healthcare provider will check your blood pressure at each visit  High blood pressure can lead to a stroke or other problems  Your provider may check for signs of heart disease, cancer, or other health problems  How to lose weight safely:  A safe and healthy way to lose weight is to eat fewer calories and get regular exercise  You can lose up about 1 pound a week by decreasing the number of calories you eat by 500 calories each day  You can decrease calories by eating smaller portion sizes or by cutting out high-calorie foods  Read labels to find out how many calories are in the foods you eat  You can also burn calories with exercise such as walking, swimming, or biking  You will be more likely to keep weight off if you make these changes part of your lifestyle  Exercise at least 30 minutes per day on most days of the week   You can also fit in more physical activity by taking the stairs instead of the elevator or parking farther away from stores  Ask your healthcare provider about the best exercise plan for you  Healthy meal plan for weight management:  A healthy meal plan includes a variety of foods, contains fewer calories, and helps you stay healthy  A healthy meal plan includes the following:     Eat whole-grain foods more often  A healthy meal plan should contain fiber  Fiber is the part of grains, fruits, and vegetables that is not broken down by your body  Whole-grain foods are healthy and provide extra fiber in your diet  Some examples of whole-grain foods are whole-wheat breads and pastas, oatmeal, brown rice, and bulgur  Eat a variety of vegetables every day  Include dark, leafy greens such as spinach, kale, clara greens, and mustard greens  Eat yellow and orange vegetables such as carrots, sweet potatoes, and winter squash  Eat a variety of fruits every day  Choose fresh or canned fruit (canned in its own juice or light syrup) instead of juice  Fruit juice has very little or no fiber  Eat low-fat dairy foods  Drink fat-free (skim) milk or 1% milk  Eat fat-free yogurt and low-fat cottage cheese  Try low-fat cheeses such as mozzarella and other reduced-fat cheeses  Choose meat and other protein foods that are low in fat  Choose beans or other legumes such as split peas or lentils  Choose fish, skinless poultry (chicken or turkey), or lean cuts of red meat (beef or pork)  Before you cook meat or poultry, cut off any visible fat  Use less fat and oil  Try baking foods instead of frying them  Add less fat, such as margarine, sour cream, regular salad dressing and mayonnaise to foods  Eat fewer high-fat foods  Some examples of high-fat foods include french fries, doughnuts, ice cream, and cakes  Eat fewer sweets  Limit foods and drinks that are high in sugar   This includes candy, cookies, regular soda, and sweetened drinks  Ways to decrease calories:   Eat smaller portions  Use a small plate with smaller servings  Do not eat second helpings  When you eat at a restaurant, ask for a box and place half of your meal in the box before you eat  Share an entrée with someone else  Replace high-calorie snacks with healthy, low-calorie snacks  Choose fresh fruit, vegetables, fat-free rice cakes, or air-popped popcorn instead of potato chips, nuts, or chocolate  Choose water or calorie-free drinks instead of soda or sweetened drinks  Do not shop for groceries when you are hungry  You may be more likely to make unhealthy food choices  Take a grocery list of healthy foods and shop after you have eaten  Eat regular meals  Do not skip meals  Skipping meals can lead to overeating later in the day  This can make it harder for you to lose weight  Eat a healthy snack in place of a meal if you do not have time to eat a regular meal  Talk with a dietitian to help you create a meal plan and schedule that is right for you  Other things to consider as you try to lose weight:   Be aware of situations that may give you the urge to overeat, such as eating while watching television  Find ways to avoid these situations  For example, read a book, go for a walk, or do crafts  Meet with a weight loss support group or friends who are also trying to lose weight  This may help you stay motivated to continue working on your weight loss goals  © Copyright [employer] 2022 Information is for End User's use only and may not be sold, redistributed or otherwise used for commercial purposes  All illustrations and images included in CareNotes® are the copyrighted property of [employer] Inc  or [facility][note 3] Kayla Cardenas   The above information is an  only  It is not intended as medical advice for individual conditions or treatments   Talk to your doctor, nurse or pharmacist before following any medical regimen to see if it is safe and effective for you  Obesity   AMBULATORY CARE:   Obesity  means your body mass index (BMI) is greater than 30  Your healthcare provider will use your height and weight to measure your BMI  The risks of obesity include  many health problems, including injuries or physical disability  Diabetes (high blood sugar level)    High blood pressure or high cholesterol    Heart disease    Stroke    Gallbladder or liver disease    Cancer of the colon, breast, prostate, liver, or kidney    Sleep apnea    Arthritis or gout    Screening  is done to check for health conditions before you have signs or symptoms  If you are 28to 79years old, your blood sugar level may be checked every 3 years for signs of prediabetes or diabetes  Your healthcare provider will check your blood pressure at each visit  High blood pressure can lead to a stroke or other problems  Your provider may check for signs of heart disease, cancer, or other health problems  Seek care immediately if:   You have a severe headache, confusion, or difficulty speaking  You have weakness on one side of your body  You have chest pain, sweating, or shortness of breath  Call your doctor if:   You have symptoms of gallbladder or liver disease, such as pain in your upper abdomen  You have knee or hip pain and discomfort while walking  You have symptoms of diabetes, such as intense hunger and thirst, and frequent urination  You have symptoms of sleep apnea, such as snoring or daytime sleepiness  You have questions or concerns about your condition or care  Treatment for obesity  focuses on helping you lose weight to improve your health  Even a small decrease in BMI can reduce the risk for many health problems  Your healthcare provider will help you set a weight-loss goal   Lifestyle changes  are the first step in treating obesity  These include making healthy food choices and getting regular physical activity   Your healthcare provider may suggest a weight-loss program that involves coaching, education, and therapy  Medicine  may help you lose weight when it is used with a healthy foods and physical activity  Surgery  can help you lose weight if you are very obese and have other health problems  There are several types of weight-loss surgery  Ask your healthcare provider for more information  Tips for safe weight loss:   Set small, realistic goals  An example of a small goal is to walk for 20 minutes 5 days a week  Anther goal is to lose 5% of your body weight  Tell friends, family members, and coworkers about your goals  and ask for their support  Ask a friend to lose weight with you, or join a weight-loss support group  Identify foods or triggers that may cause you to overeat , and find ways to avoid them  Remove tempting high-calorie foods from your home and workplace  Place a bowl of fresh fruit on your kitchen counter  If stress causes you to eat, then find other ways to cope with stress  A counselor or therapist may be able to help you  Keep a diary to track what you eat and drink  Also write down how many minutes of physical activity you do each day  Weigh yourself once a week and record it in your diary  Eating changes: You will need to eat 500 to 1,000 fewer calories each day than you currently eat to lose 1 to 2 pounds a week  The following changes will help you cut calories:  Eat smaller portions  Use small plates, no larger than 9 inches in diameter  Fill your plate half full of fruits and vegetables  Measure your food using measuring cups until you know what a serving size looks like  Eat 3 meals and 1 or 2 snacks each day  Plan your meals in advance  Cristy Bradford and eat at home most of the time  Eat slowly  Do not skip meals  Skipping meals can lead to overeating later in the day  This can make it harder for you to lose weight   Talk with a dietitian to help you make a meal plan and schedule that is right for you  Eat fruits and vegetables at every meal   They are low in calories and high in fiber, which makes you feel full  Do not add butter, margarine, or cream sauce to vegetables  Use herbs to season steamed vegetables  Eat less fat and fewer fried foods  Eat more baked or grilled chicken and fish  These protein sources are lower in calories and fat than red meat  Limit fast food  Dress your salads with olive oil and vinegar instead of bottled dressing  Limit the amount of sugar you eat  Do not drink sugary beverages  Limit alcohol  Activity changes:  Physical activity is good for your body in many ways  It helps you burn calories and build strong muscles  It decreases stress and depression, and improves your mood  It can also help you sleep better  Talk to your healthcare provider before you begin an exercise program   Exercise for at least 30 minutes 5 days a week  Start slowly  Set aside time each day for physical activity that you enjoy and that is convenient for you  It is best to do both weight training and an activity that increases your heart rate, such as walking, bicycling, or swimming  Find ways to be more active  Do yard work and housecleaning  Walk up the stairs instead of using elevators  Spend your leisure time going to events that require walking, such as outdoor festivals or fairs  This extra physical activity can help you lose weight and keep it off  Follow up with your doctor as directed: You may need to meet with a dietitian  Write down your questions so you remember to ask them during your visits  © [employer] 2022 Information is for End User's use only and may not be sold, redistributed or otherwise used for commercial purposes  All illustrations and images included in CareNotes® are the copyrighted property of [employer]  or Bronson Harris  The above information is an  only   It is not intended as medical advice for individual conditions or treatments  Talk to your doctor, nurse or pharmacist before following any medical regimen to see if it is safe and effective for you  Cholesterol and Your Health   AMBULATORY CARE:   Cholesterol  is a waxy, fat-like substance  Your body uses cholesterol to make hormones and new cells, and to protect nerves  Cholesterol is made by your body  It also comes from certain foods you eat, such as meat and dairy products  Your healthcare provider can help you set goals for your cholesterol levels  He or she can help you create a plan to meet your goals  Cholesterol level goals: Your cholesterol level goals depend on your risk for heart disease, your age, and your other health conditions  The following are general guidelines: Total cholesterol  includes low-density lipoprotein (LDL), high-density lipoprotein (HDL), and triglyceride levels  The total cholesterol level should be lower than 200 mg/dL and is best at about 150 mg/dL  LDL cholesterol  is called bad cholesterol  because it forms plaque in your arteries  As plaque builds up, your arteries become narrow, and less blood flows through  When plaque decreases blood flow to your heart, you may have chest pain  If plaque completely blocks an artery that brings blood to your heart, you may have a heart attack  Plaque can break off and form blood clots  Blood clots may block arteries in your brain and cause a stroke  The level should be less than 130 mg/dL and is best at about 100 mg/dL  HDL cholesterol  is called good cholesterol  because it helps remove LDL cholesterol from your arteries  It does this by attaching to LDL cholesterol and carrying it to your liver  Your liver breaks down LDL cholesterol so your body can get rid of it  High levels of HDL cholesterol can help prevent a heart attack and stroke  Low levels of HDL cholesterol can increase your risk for heart disease, heart attack, and stroke   The level should be 60 mg/dL or higher  Triglycerides  are a type of fat that store energy from foods you eat  High levels of triglycerides also cause plaque buildup  This can increase your risk for a heart attack or stroke  If your triglyceride level is high, your LDL cholesterol level may also be high  The level should be less than 150 mg/dL  Any of the following can increase your risk for high cholesterol:   Smoking cigarettes    Being overweight or obese, or not getting enough exercise    Drinking large amounts of alcohol    A medical condition such as hypertension (high blood pressure) or diabetes    Certain genes passed from your parents to you    Age older than 65 years    What you need to know about having your cholesterol levels checked: Adults 21to 39years of age should have their cholesterol levels checked every 4 to 6 years  Adults 45 years or older should have their cholesterol checked every 1 to 2 years  You may need your cholesterol checked more often, or at a younger age, if you have risk factors for heart disease  You may also need to have your cholesterol checked more often if you have other health conditions, such as diabetes  Blood tests are used to check cholesterol levels  Blood tests measure your levels of triglycerides, LDL cholesterol, and HDL cholesterol  How healthy fats affect your cholesterol levels:  Healthy fats, also called unsaturated fats, help lower LDL cholesterol and triglyceride levels  Healthy fats include the following:  Monounsaturated fats  are found in foods such as olive oil, canola oil, avocado, nuts, and olives  Polyunsaturated fats,  such as omega 3 fats, are found in fish, such as salmon, trout, and tuna  They can also be found in plant foods such as flaxseed, walnuts, and soybeans  How unhealthy fats affect your cholesterol levels:  Unhealthy fats increase LDL cholesterol and triglyceride levels   They are found in foods high in cholesterol, saturated fat, and trans fat:  Cholesterol  is found in eggs, dairy, and meat  Saturated fat  is found in butter, cheese, ice cream, whole milk, and coconut oil  Saturated fat is also found in meat, such as sausage, hot dogs, and bologna  Trans fat  is found in liquid oils and is used in fried and baked foods  Foods that contain trans fats include chips, crackers, muffins, sweet rolls, microwave popcorn, and cookies  Treatment  for high cholesterol will also decrease your risk of heart disease, heart attack, and stroke  Treatment may include any of the following:  Lifestyle changes  may include food, exercise, weight loss, and quitting smoking  You may also need to decrease the amount of alcohol you drink  Your healthcare provider will want you to start with lifestyle changes  Other treatment may be added if lifestyle changes are not enough  Your healthcare provider may recommend you work with a team to manage hyperlipidemia  The team may include medical experts such as a dietitian, an exercise or physical therapist, and a behavior therapist  Your family members may be included in helping you create lifestyle changes  Medicines  may be given to lower your LDL cholesterol, triglyceride levels, or total cholesterol level  You may need medicines to lower your cholesterol if any of the following is true:    You have a history of stroke, TIA, unstable angina, or a heart attack  Your LDL cholesterol level is 190 mg/dL or higher  You are age 36 to 76 years, have diabetes or heart disease risk factors, and your LDL cholesterol is 70 mg/dL or higher  Supplements  include fish oil, red yeast rice, and garlic  Fish oil may help lower your triglyceride and LDL cholesterol levels  It may also increase your HDL cholesterol level  Red yeast rice may help decrease your total cholesterol level and LDL cholesterol level  Garlic may help lower your total cholesterol level   Do not take any supplements without talking to your healthcare provider  Food changes you can make to lower your cholesterol levels:  A dietitian can help you create a healthy eating plan  He or she can show you how to read food labels and choose foods low in saturated fat, trans fats, and cholesterol  Decrease the total amount of fat you eat  Choose lean meats, fat-free or 1% fat milk, and low-fat dairy products, such as yogurt and cheese  Try to limit or avoid red meats  Limit or do not eat fried foods or baked goods, such as cookies  Replace unhealthy fats with healthy fats  Cook foods in olive oil or canola oil  Choose soft margarines that are low in saturated fat and trans fat  Seeds, nuts, and avocados are other examples of healthy fats  Eat foods with omega-3 fats  Examples include salmon, tuna, mackerel, walnuts, and flaxseed  Eat fish 2 times per week  Pregnant women should not eat fish that have high levels of mercury, such as shark, swordfish, and jagjit mackerel  Increase the amount of high-fiber foods you eat  High-fiber foods can help lower your LDL cholesterol  Aim to get between 20 and 30 grams of fiber each day  Fruits and vegetables are high in fiber  Eat at least 5 servings each day  Other high-fiber foods are whole-grain or whole-wheat breads, pastas, or cereals, and brown rice  Eat 3 ounces of whole-grain foods each day  Increase fiber slowly  You may have abdominal discomfort, bloating, and gas if you add fiber to your diet too quickly  Eat healthy protein foods  Examples include low-fat dairy products, skinless chicken and turkey, fish, and nuts  Limit foods and drinks that are high in sugar  Your dietitian or healthcare provider can help you create daily limits for high-sugar foods and drinks  The limit may be lower if you have diabetes or another health condition  Limits can also help you lose weight if needed  Lifestyle changes you can make to lower your cholesterol levels:   Maintain a healthy weight    Ask your healthcare provider what a healthy weight is for you  Ask him or her to help you create a weight loss plan if needed  Weight loss can decrease your total cholesterol and triglyceride levels  Weight loss may also help keep your blood pressure at a healthy level  Be physically active throughout the day  Physical activity, such as exercise, can help lower your total cholesterol level and maintain a healthy weight  Physical activity can also help increase your HDL cholesterol level  Work with your healthcare provider to create an program that is right for you  Get at least 30 to 40 minutes of moderate physical activity most days of the week  Examples of exercise include brisk walking, swimming, or biking  Also include strength training at least 2 times each week  Your healthcare providers can help you create a physical activity plan  Do not smoke  Nicotine and other chemicals in cigarettes and cigars can raise your cholesterol levels  Ask your healthcare provider for information if you currently smoke and need help to quit  E-cigarettes or smokeless tobacco still contain nicotine  Talk to your healthcare provider before you use these products  Limit or do not drink alcohol  Alcohol can increase your triglyceride levels  Ask your healthcare provider before you drink alcohol  Ask how much is okay for you to drink in 24 hours or 1 week  Follow up with your doctor as directed:  Write down your questions so you remember to ask them during your visits  © Copyright [employer] 2022 Information is for End User's use only and may not be sold, redistributed or otherwise used for commercial purposes  All illustrations and images included in CareNotes® are the copyrighted property of [employer]  or [employer]onson Cardenas   The above information is an  only  It is not intended as medical advice for individual conditions or treatments   Talk to your doctor, nurse or pharmacist before following any medical regimen to see if it is safe and effective for you

## 2022-05-13 NOTE — ASSESSMENT & PLAN NOTE
Patient had a gastric sleeve procedure performed approximately 6-7 years ago  The patient's highest weight was 360 lb  He did get down to approximately 220 lb but has since gained weight back down and his current weight is 321 lb  The patient admits to following a poor diet  He does not perform any formal exercise  He continues to follow with Bariatric  Blood work has been ordered for surveillance for the patient

## 2022-05-17 LAB — HIV 1+2 AB+HIV1 P24 AG SERPL QL IA: NORMAL

## 2022-06-03 ENCOUNTER — OFFICE VISIT (OUTPATIENT)
Dept: PODIATRY | Facility: CLINIC | Age: 37
End: 2022-06-03
Payer: COMMERCIAL

## 2022-06-03 ENCOUNTER — APPOINTMENT (OUTPATIENT)
Dept: RADIOLOGY | Facility: CLINIC | Age: 37
End: 2022-06-03
Payer: COMMERCIAL

## 2022-06-03 VITALS — HEIGHT: 70 IN | WEIGHT: 315 LBS | BODY MASS INDEX: 45.1 KG/M2

## 2022-06-03 DIAGNOSIS — M72.2 PLANTAR FASCIITIS: ICD-10-CM

## 2022-06-03 DIAGNOSIS — M79.671 FOOT PAIN, RIGHT: ICD-10-CM

## 2022-06-03 DIAGNOSIS — Q66.70 CAVUS DEFORMITY OF FOOT: Primary | ICD-10-CM

## 2022-06-03 PROCEDURE — 99243 OFF/OP CNSLTJ NEW/EST LOW 30: CPT | Performed by: STUDENT IN AN ORGANIZED HEALTH CARE EDUCATION/TRAINING PROGRAM

## 2022-06-03 PROCEDURE — 3008F BODY MASS INDEX DOCD: CPT | Performed by: STUDENT IN AN ORGANIZED HEALTH CARE EDUCATION/TRAINING PROGRAM

## 2022-06-03 PROCEDURE — 73630 X-RAY EXAM OF FOOT: CPT

## 2022-06-03 PROCEDURE — 73610 X-RAY EXAM OF ANKLE: CPT

## 2022-06-04 NOTE — PROGRESS NOTES
Assessment/Plan:    No problem-specific Assessment & Plan notes found for this encounter  Diagnoses and all orders for this visit:    Cavus deformity of foot  -     Orthotics B/L  -     Ambulatory referral to Physical Therapy; Future    Plantar fasciitis  -     Ambulatory Referral to Podiatry  -     Cancel: X-ray ankle right 3+ views; Future  -     X-ray foot right 3+ views; Future  -     X-ray ankle right 3+ views; Future  -     Orthotics B/L  -     Ambulatory referral to Physical Therapy; Future    Foot pain, right  -     Orthotics B/L  -     Ambulatory referral to Physical Therapy; Future      Plan:     - diagnosis and treatments discussed with patient  - I personally interpreted x-ray findings with patient, without any osseous abnormalities, patient has high arch/cavus foot type  - discussed conservative management pain with supportive wider toe box shoe gear and custom molded orthotics  Given patient has a 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 he does exhibit fluid of peroneus longus and with insertional pain and mild varus foot deformity  Discussed stretching and strengthening exercises for the foot pain  Meantime continue wearing ankle brace with supportive shoes  - return in 4-5 6 post orthotic training in the shoe  - all questions and concerns addressed    Subjective:      Patient ID: Mary Villafana is a 42 y o  male  55-year-old male with significant past medical history presents with complaint of left foot pain  Patient reports      The following portions of the patient's history were reviewed and updated as appropriate:   He  has a past medical history of Allergy to environmental factors, Asthma, Bariatric surgery status, Full dentures, Headache, Migraine, Morbid obesity ([address] ), Obesity, Postsurgical malabsorption, Rash, and Wears glasses    He   Patient Active Problem List    Diagnosis Date Noted    Chronic pain of right ankle 05/13/2022    Iron deficiency anemia 05/13/2022    Family history of diabetes mellitus 05/13/2022    Abnormal weight gain 05/07/2019    Pain in lateral portion of right ankle 03/11/2019    Bariatric surgery status 07/06/2018    Vitamin D insufficiency 01/04/2018    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    Morbid (severe) obesity due to excess calories ([facility] [address] ) 06/21/2017     He  has a past surgical history that includes Dental surgery; Tonsillectomy; pr egd transoral biopsy single/multiple (N/A, 5/3/2017); pr lap, janice restrict proc, longitudinal gastrectomy (N/A, 6/20/2017); Esophagogastroduodenoscopy (N/A, 6/20/2017); Gastric bypass (06/2017); and pr repair/graft flex foot tendon (Right, 3/27/2019)       Review of Systems   Constitutional: Negative for chills and fever  Respiratory: Negative for shortness of breath  Cardiovascular: Negative for chest pain  Gastrointestinal: Negative for diarrhea, nausea and vomiting  Musculoskeletal: Positive for arthralgias  Skin: Negative for color change  Neurological: Negative for dizziness  Psychiatric/Behavioral: Negative for agitation  Objective:      Ht 5' 10" (1 778 m)   Wt (!) 146 kg (321 lb)   BMI 46 06 kg/m²          Physical Exam  Vitals reviewed  Constitutional:       Appearance: He is obese  Cardiovascular:      Pulses: Normal pulses  Pulmonary:      Effort: Pulmonary effort is normal    Musculoskeletal:         General: Tenderness present  Right foot: Decreased range of motion  Deformity and tenderness present  Comments: Mild tenderness to touch at the peroneal complex [place] fibular groove as well as plantar sub met cuneiform 1 at the as long as insertion  No pain with ankle dorsiflexion and plantar flexion  Mild discomfort with ankle eversion  Patient exhibits gastroc equinus deformity  It in various position with high arch foot type  Skin:     General: Skin is warm and dry  Neurological:      General: No focal deficit present  Mental Status: He is alert  Psychiatric:         Mood and Affect: Mood normal

## 2022-07-15 ENCOUNTER — EVALUATION (OUTPATIENT)
Dept: PHYSICAL THERAPY | Facility: CLINIC | Age: 37
End: 2022-07-15
Payer: COMMERCIAL

## 2022-07-15 DIAGNOSIS — Q66.70 CAVUS DEFORMITY OF FOOT: ICD-10-CM

## 2022-07-15 DIAGNOSIS — M72.2 PLANTAR FASCIITIS: ICD-10-CM

## 2022-07-15 DIAGNOSIS — M79.671 FOOT PAIN, RIGHT: ICD-10-CM

## 2022-07-15 PROCEDURE — 97161 PT EVAL LOW COMPLEX 20 MIN: CPT | Performed by: PHYSICAL THERAPIST

## 2022-07-15 PROCEDURE — 97760 ORTHOTIC MGMT&TRAING 1ST ENC: CPT | Performed by: PHYSICAL THERAPIST

## 2022-07-15 NOTE — PROGRESS NOTES
PT Evaluation     Today's date: 7/15/2022  Patient name: Reginaldo Mejia  : 1985  MRN: [id number]  Referring provider: Annie Faustin DPM  Dx:   Encounter Diagnosis     ICD-10-CM    1  Plantar fasciitis  M72 2 Ambulatory referral to Physical Therapy   2  Cavus deformity of foot  Q66 70 Ambulatory referral to Physical Therapy   3  Foot pain, right  M79 [id number] Ambulatory referral to Physical Therapy                  Assessment/Plan  Pt should benefit from custom orthotics  Goals:  Fit orthotics  IP in break in period  Assure good shoe fit    Return for one more visit in 2-4 weeks to fit orthotics  Subjective  Pt presents for new orthotics, old ones have cracked, hasn't used for the last few months  Since then he "pulled a muscle" in arch region, took quite a while to resolve    Good success with old orthotics    Objective  Sensation grossly in tact to LT  bilat pes cavus  LMI  R 4 deg inv  L 0 deg    Gait: excessive supination after heel strike     Hallux DF PROM wnl  Limited ankle DF PROM bilat  Inv,ev PROM wfl slightly restricted R    FF valgus bilat    TTP FHL/MLA    Scanned for custom orthotics             Precautions: none      Manuals                                                                 Neuro Re-Ed                                                                                                        Ther Ex                                                                                                                     Ther Activity                                       Gait Training                                       Modalities

## 2022-09-23 ENCOUNTER — OFFICE VISIT (OUTPATIENT)
Dept: PHYSICAL THERAPY | Facility: CLINIC | Age: 37
End: 2022-09-23
Payer: COMMERCIAL

## 2022-09-23 DIAGNOSIS — M21.41 PES PLANUS OF BOTH FEET: Primary | ICD-10-CM

## 2022-09-23 DIAGNOSIS — M21.42 PES PLANUS OF BOTH FEET: Primary | ICD-10-CM

## 2022-09-23 PROCEDURE — L3010 FOOT LONGITUDINAL ARCH SUPPO: HCPCS | Performed by: PHYSICAL THERAPIST

## 2023-01-31 ENCOUNTER — AMB VIDEO VISIT (OUTPATIENT)
Dept: OTHER | Facility: HOSPITAL | Age: 38
End: 2023-01-31

## 2023-01-31 DIAGNOSIS — N48.89 PENILE PAIN: Primary | ICD-10-CM

## 2023-01-31 PROBLEM — D50.9 IRON DEFICIENCY ANEMIA: Status: RESOLVED | Noted: 2022-05-13 | Resolved: 2023-01-31

## 2023-01-31 RX ORDER — VALACYCLOVIR HYDROCHLORIDE 1 G/1
1000 TABLET, FILM COATED ORAL 2 TIMES DAILY
Qty: 20 TABLET | Refills: 0 | Status: SHIPPED | OUTPATIENT
Start: 2023-01-31 | End: 2023-02-10

## 2023-01-31 RX ORDER — LIDOCAINE 40 MG/G
CREAM TOPICAL AS NEEDED
Qty: 30 G | Refills: 0 | Status: SHIPPED | OUTPATIENT
Start: 2023-01-31

## 2023-01-31 RX ORDER — LIDOCAINE 50 MG/G
OINTMENT TOPICAL AS NEEDED
Qty: 35.44 G | Refills: 0 | Status: SHIPPED | OUTPATIENT
Start: 2023-01-31 | End: 2023-01-31 | Stop reason: ALTCHOICE

## 2023-01-31 NOTE — PROGRESS NOTES
Video Visit - Mordechai Riedel 34 y o  male MRN: [id number]    REQUIRED DOCUMENTATION:         1  This service was provided via [facility]  2  Provider located at [address] [place] [id number] [phone]  3  [facility] provider: Corby Kemp PA-C   4  Identify all parties in room with patient during [facility] visit:  No one else  5  After connecting through [employer], patient was identified by name and date of birth  Patient was then informed that this was a Telemedicine visit and that the exam was being conducted confidentially over secure lines  My office door was closed  No one else was in the room  Patient acknowledged consent and understanding of privacy and security of the Telemedicine visit  I informed the patient that I have reviewed their record in Epic and presented the opportunity for them to ask any questions regarding the visit today  The patient agreed to participate  VITALS: Heart Rate: 92 BPM, Respiratory Rate: 16 RPM, Temperature Unavailable° F, Blood Pressure Unavailable mmHg, Pulse Ox Unavailable % on RA    HPI  Pt reports soreness and pain on penis since Friday  Pain has gotten so severe that it hurts to sit  Reports head of penis is pale in color  It was itchy  Now feels abrasions to underside of penis, but cannot see it  Tried lotrimin without relief  Recently started using a beard cream, but not near his penis and he washes his hands immediately after use  Reports new sexual partner in beginning of January-oral and vaginal sex  Sex with only women  Uses male condoms for protection  He did not notice any cold sores on her mouth  He has never had a cold sore  Denies pain into testicles  Uncircumcised  Denies discharge  Physical Exam  Constitutional:       General: He is not in acute distress  Appearance: Normal appearance  He is obese  He is not toxic-appearing  HENT:      Head: Normocephalic and atraumatic  Nose: No rhinorrhea  Mouth/Throat:      Mouth: Mucous membranes are moist    Eyes:      Conjunctiva/sclera: Conjunctivae normal    Pulmonary:      Effort: Pulmonary effort is normal  No respiratory distress  Breath sounds: No wheezing (no gross audible wheeze through computer)  Genitourinary:     Penis: Uncircumcised  Erythema present  Comments: Some erythema noted to R rainey  Glans did not appear abnormal  Patient had some difficulty keeping camera still  Musculoskeletal:      Cervical back: Normal range of motion  Skin:     Findings: No rash (on face or neck)  Neurological:      Mental Status: He is alert  Cranial Nerves: No dysarthria or facial asymmetry  Psychiatric:         Mood and Affect: Mood normal          Behavior: Behavior normal        Suspect herpes  Explained most accurate dx is done with swabbing lesion, however not possible through virtual platform  Will order HSV PCR  Advised f/u with PCP  Diagnoses and all orders for this visit:    Penile pain  -     HSV TYPE 1,2 DNA PCR; Future  -     lidocaine (XYLOCAINE) 5 % ointment; Apply topically as needed for mild pain  -     valACYclovir (VALTREX) 1,000 mg tablet; Take 1 tablet (1,000 mg total) by mouth 2 (two) times a day for 10 days      Patient Instructions   Genital Herpes Simplex   WHAT YOU NEED TO KNOW:   Genital herpes is a sexually transmitted infection (STI) that is caused by the herpes simplex virus (HSV)  It is spread through oral, vaginal, or anal sex  It may be spread even if you do not see blisters  It can also be spread to other areas of your body, including your eyes, by touching open blisters  If you are pregnant, it may be spread to your baby while he or she is still in your womb or during vaginal delivery  Unprotected sex or sex with multiple partners increases your risk for genital herpes  DISCHARGE INSTRUCTIONS:   Call 911 for any of the following:   · You have trouble breathing  · You have a seizure      · Your neck is stiff     · You have trouble thinking clearly  Contact your healthcare provider if:   · You have chills or a fever  · You have painful blisters on your penis, vagina, anus, or mouth  · Fluid or blood is coming out of your genitals  · You have trouble urinating  · You think you are pregnant and you are bleeding from your vagina  · You have trouble chewing or swallowing  · Your symptoms do not get better, or they get worse, even after treatment  · You have questions or concerns about your condition or care  Medicines:   · Antivirals  may help decrease your symptoms  · Numbing cream or ointment  may help decrease pain  · NSAIDs , such as ibuprofen, help decrease swelling, pain, and fever  This medicine is available with or without a doctor's order  NSAIDs can cause stomach bleeding or kidney problems in certain people  If you take blood thinner medicine, always ask your healthcare provider if NSAIDs are safe for you  Always read the medicine label and follow directions  · Take your medicine as directed  Contact your healthcare provider if you think your medicine is not helping or if you have side effects  Tell him or her if you are allergic to any medicine  Keep a list of the medicines, vitamins, and herbs you take  Include the amounts, and when and why you take them  Bring the list or the pill bottles to follow-up visits  Carry your medicine list with you in case of an emergency  Manage your symptoms:  Do the following to be more comfortable when your infection is active:  · Keep the blisters clean and dry  Wash them with soap and warm water, and pat dry gently  · Wear cotton underwear and loose clothing  This may help to keep the blisters dry and keep clothes from rubbing  · Apply ice  on the area for 15 to 20 minutes every hour or as directed  Use an ice pack, or put crushed ice in a plastic bag  Cover it with a towel   Ice helps prevent tissue damage and decreases swelling and pain  · Apply heat  on the area for 20 to 30 minutes every 2 hours for as many days as directed  A warm bath may also help  Heat helps decrease pain and muscle spasms  Prevent the spread of genital herpes:   · Use condoms  Use a latex condom when you have oral, genital, and anal sex  Use a new condom each time  Use a polyurethane condom if you are allergic to latex  · Try not to touch your blisters  Wash your hands before and after you touch the area  Do not kiss anyone if you have blisters around your mouth  Do not breastfeed if you have blisters on your breast      · Tell your partners  that you have genital herpes  Do not have sex until he or she knows that you have genital herpes  Ask your healthcare provider for ways to tell partners about your infection  · Tell your healthcare providers  that you have genital herpes  If you are pregnant, your baby may need special monitoring  Inform your healthcare provider of your condition to avoid spreading the infection to your baby  Follow up with your doctor as directed:  Write down your questions so you remember to ask them during your visits  © Copyright [employer] 2022 Information is for End User's use only and may not be sold, redistributed or otherwise used for commercial purposes  All illustrations and images included in CareNotes® are the copyrighted property of A D A M , Inc  or [facility] Kayla Cardenas   The above information is an  only  It is not intended as medical advice for individual conditions or treatments  Talk to your doctor, nurse or pharmacist before following any medical regimen to see if it is safe and effective for you

## 2023-01-31 NOTE — PATIENT INSTRUCTIONS
Genital Herpes Simplex   WHAT YOU NEED TO KNOW:   Genital herpes is a sexually transmitted infection (STI) that is caused by the herpes simplex virus (HSV)  It is spread through oral, vaginal, or anal sex  It may be spread even if you do not see blisters  It can also be spread to other areas of your body, including your eyes, by touching open blisters  If you are pregnant, it may be spread to your baby while he or she is still in your womb or during vaginal delivery  Unprotected sex or sex with multiple partners increases your risk for genital herpes  DISCHARGE INSTRUCTIONS:   Call 911 for any of the following: You have trouble breathing  You have a seizure  Your neck is stiff  You have trouble thinking clearly  Contact your healthcare provider if:   You have chills or a fever  You have painful blisters on your penis, vagina, anus, or mouth  Fluid or blood is coming out of your genitals  You have trouble urinating  You think you are pregnant and you are bleeding from your vagina  You have trouble chewing or swallowing  Your symptoms do not get better, or they get worse, even after treatment  You have questions or concerns about your condition or care  Medicines:   Antivirals  may help decrease your symptoms  Numbing cream or ointment  may help decrease pain  NSAIDs , such as ibuprofen, help decrease swelling, pain, and fever  This medicine is available with or without a doctor's order  NSAIDs can cause stomach bleeding or kidney problems in certain people  If you take blood thinner medicine, always ask your healthcare provider if NSAIDs are safe for you  Always read the medicine label and follow directions  Take your medicine as directed  Contact your healthcare provider if you think your medicine is not helping or if you have side effects  Tell him or her if you are allergic to any medicine  Keep a list of the medicines, vitamins, and herbs you take   Include the amounts, and when and why you take them  Bring the list or the pill bottles to follow-up visits  Carry your medicine list with you in case of an emergency  Manage your symptoms:  Do the following to be more comfortable when your infection is active:  Keep the blisters clean and dry  Wash them with soap and warm water, and pat dry gently  Wear cotton underwear and loose clothing  This may help to keep the blisters dry and keep clothes from rubbing  Apply ice  on the area for 15 to 20 minutes every hour or as directed  Use an ice pack, or put crushed ice in a plastic bag  Cover it with a towel  Ice helps prevent tissue damage and decreases swelling and pain  Apply heat  on the area for 20 to 30 minutes every 2 hours for as many days as directed  A warm bath may also help  Heat helps decrease pain and muscle spasms  Prevent the spread of genital herpes:   Use condoms  Use a latex condom when you have oral, genital, and anal sex  Use a new condom each time  Use a polyurethane condom if you are allergic to latex  Try not to touch your blisters  Wash your hands before and after you touch the area  Do not kiss anyone if you have blisters around your mouth  Do not breastfeed if you have blisters on your breast      Tell your partners  that you have genital herpes  Do not have sex until he or she knows that you have genital herpes  Ask your healthcare provider for ways to tell partners about your infection  Tell your healthcare providers  that you have genital herpes  If you are pregnant, your baby may need special monitoring  Inform your healthcare provider of your condition to avoid spreading the infection to your baby  Follow up with your doctor as directed:  Write down your questions so you remember to ask them during your visits  © Copyright [employer] 2022 Information is for End User's use only and may not be sold, redistributed or otherwise used for commercial purposes   All illustrations and images included in CareNotes® are the copyrighted property of A D A M , Inc  or [employer]  The above information is an  only  It is not intended as medical advice for individual conditions or treatments  Talk to your doctor, nurse or pharmacist before following any medical regimen to see if it is safe and effective for you

## 2023-01-31 NOTE — CARE ANYWHERE EVISITS
Visit Summary for Brittany Roussaeu - Gender: Male - Date of Birth: [id number]  Date: [id number] - Duration: 8 minutes  Patient: Brittany Rousseau  Provider: Sandra Garcia PA-C    Patient Contact Information  Address  [address]; [address]  [phone]    Visit Topics    Triage Questions   What is your current physical address in the event of a medical emergency? Answer []  Are you allergic to any medications? Answer []  Are you now or could you be pregnant? Answer []  Do you have any immune system compromise or chronic lung   disease? Answer []  Do you have any vulnerable family members in the home (infant, pregnant, cancer, elderly)? Answer []     Conversation Transcripts  [0A][0A] [Notification] You are connected with Sandra Garcia PA-C, Urgent Care Specialist [0A][Notification] [facility] Kellogg is located in [place]  [0A][Notification] Brittany Rousseau has shared health history  Javier Taylor  [0A]    Diagnosis  Other specified disorders of penis    Procedures  Value: [id number] Code: CPT-4 UNLISTED E&M SERVICE    Medications Prescribed    No prescriptions ordered    Electronically signed by: Marie Patiño(NPI [id number])

## 2023-02-03 ENCOUNTER — APPOINTMENT (OUTPATIENT)
Dept: LAB | Facility: CLINIC | Age: 38
End: 2023-02-03

## 2023-02-03 DIAGNOSIS — N48.89 PENILE PAIN: ICD-10-CM

## 2023-02-07 LAB
HSV1 DNA SPEC QL NAA+PROBE: NEGATIVE
HSV2 DNA SPEC QL NAA+PROBE: NEGATIVE

## 2024-04-16 NOTE — TELEPHONE ENCOUNTER
Called the patient and asked him what day and time he would be available and the patient stated that he can come in on Friday at 2:30pm at [facility] 
Dr Margi Patel, this patient is on your schedule for  3/26  [facility] order MRI and there are immediate findings in Epic  Let us know if he needs a sooner appt 
Yes he can see me tomorrow at [facility] or Friday at [facility]  [facility] can help find a good place for him since both clinics are booked 
immediate findings MRI  right heel see results in Epic  Pt  Was seen by Kari Rosario PA-C and was sent for MRI   Pt  Is schedule to come in to see you on 3/26/2019  Juliette Sheridan is out of office and I wanted you to know there were immediate findings for this pt  Please advise  Thank you 
[employer]

## 2024-06-14 DIAGNOSIS — Z00.6 ENCOUNTER FOR EXAMINATION FOR NORMAL COMPARISON OR CONTROL IN CLINICAL RESEARCH PROGRAM: ICD-10-CM

## 2024-06-29 ENCOUNTER — APPOINTMENT (OUTPATIENT)
Dept: LAB | Facility: CLINIC | Age: 39
End: 2024-06-29

## 2024-06-29 DIAGNOSIS — Z00.6 ENCOUNTER FOR EXAMINATION FOR NORMAL COMPARISON OR CONTROL IN CLINICAL RESEARCH PROGRAM: ICD-10-CM

## 2024-06-29 PROCEDURE — 36415 COLL VENOUS BLD VENIPUNCTURE: CPT

## 2024-09-11 LAB
APOB+LDLR+PCSK9 GENE MUT ANL BLD/T: NOT DETECTED
BRCA1+BRCA2 DEL+DUP + FULL MUT ANL BLD/T: NOT DETECTED
MLH1+MSH2+MSH6+PMS2 GN DEL+DUP+FUL M: NOT DETECTED

## 2025-01-16 ENCOUNTER — TELEMEDICINE (OUTPATIENT)
Dept: OTHER | Facility: HOSPITAL | Age: 40
End: 2025-01-16
Payer: COMMERCIAL

## 2025-01-16 VITALS — TEMPERATURE: 98.7 F

## 2025-01-16 DIAGNOSIS — R68.89 FLU-LIKE SYMPTOMS: Primary | ICD-10-CM

## 2025-01-16 PROCEDURE — 99212 OFFICE O/P EST SF 10 MIN: CPT | Performed by: NURSE PRACTITIONER

## 2025-01-16 NOTE — PROGRESS NOTES
Virtual Regular Visit  Name: Aleksandr Tovar      : 1985      MRN: [id number]  Encounter Provider: Your Video Visit Provider  Encounter Date: 2025   Encounter department: VIRTUAL CARE       Verification of patient location:  Patient is located at Home in the following state in which I hold an active license [place] :  Assessment & Plan  Flu-like symptoms             Encounter provider Your Video Visit Provider    The patient was identified by name and date of birth. Aleksandr Tovar was informed that this is a telemedicine visit and that the visit is being conducted through the Epic Embedded platform. He agrees to proceed..  My office door was closed. No one else was in the room.  He acknowledged consent and understanding of privacy and security of the video platform. The patient has agreed to participate and understands they can discontinue the visit at any time.    Patient is aware this is a billable service.     History was obtained from: History obtained from: patient  History of Present Illness     This is a 40 year old male here today for video visit.  He started 2 days ago with fever, body aches, cough.  He stats he states he is having flu like symptoms.  He states he was starting to feel better today and was going to try to go work but now feels tired.  He states his temp was 99.9.  no chest pain or sob.  He is eating and drinking okay.  He states he had sore throat 2 days ago.  He needs a note for work.       Review of Systems   Constitutional:  Positive for activity change and fatigue. Negative for chills and fever.   HENT:  Positive for congestion and rhinorrhea. Negative for sinus pressure and sinus pain.    Respiratory:  Positive for cough.    Neurological: Negative.    Psychiatric/Behavioral: Negative.         Objective   Temp 98.7 °F (37.1 °C)     Physical Exam  Constitutional:       General: He is not in acute distress.     Appearance: Normal appearance. He is not ill-appearing or  toxic-appearing.   HENT:      Head: Normocephalic and atraumatic.   Eyes:      Conjunctiva/sclera: Conjunctivae normal.   Neurological:      Mental Status: He is alert and oriented to person, place, and time.   Psychiatric:         Mood and Affect: Mood normal.         Behavior: Behavior normal.         Thought Content: Thought content normal.         Judgment: Judgment normal.         Visit Time  Total Visit Duration: 5 minutes not including the time spent for establishing the audio/video connection.

## 2025-01-16 NOTE — LETTER
January 16, 2025     Patient: Aleksandr Tovar   YOB: 1985   Date of Visit: 1/16/2025       To Whom it May Concern:    Aleksandr Tovar is under my professional care. He was seen virtually on 1/16/2025. He may return to work on 01/20/2025 .    If you have any questions or concerns, please don't hesitate to call.         Sincerely,          Your Video Visit Provider        CC: No Recipients

## 2025-01-16 NOTE — PATIENT INSTRUCTIONS
As we discussed your illness is viral.  No antibiotics are indicated at this time.  Rest and drink extra fluids.  OTC cough and cold medications as needed.  Tylenol or Motrin as needed for pain or fever.  Salt water gargles and throat lozenges for sore throat.  Follow up with PCP if no improvement.  Go to ER with worsening symptoms.      Cold Symptoms   WHAT YOU NEED TO KNOW:   A cold is an infection caused by a virus. The infection causes your upper respiratory system to become inflamed. Common symptoms of a cold include sneezing, dry throat, a stuffy nose, headache, watery eyes, and a cough. Your cough may be dry, or you may cough up mucus. You may also have muscle aches, joint pain, and tiredness. Rarely, you may have a fever. Most colds go away without treatment.  DISCHARGE INSTRUCTIONS:   Return to the emergency department if:   You have increased tiredness and weakness.    You are unable to eat.     Your heart is beating much faster than usual for you.     You see white spots in the back of your throat and your neck is swollen and sore to the touch.     You see pinpoint or larger reddish-purple dots on your skin.  Contact your healthcare provider if:   You have a fever higher than 102°F (38.9°C).    You have new or worsening shortness of breath.     You have thick nasal drainage for more than 2 days.     Your symptoms do not improve or get worse within 5 days.     You have questions or concerns about your condition or care.  Medicines:  The following medicines may be suggested by your healthcare provider to decrease your cold symptoms. These medicines are available without a doctor's order. Ask which medicines to take and when to take them. Follow directions.  NSAIDs or acetaminophen  help to bring down a fever or decrease pain.    Decongestants  help decrease nasal stuffiness.     Antihistamines  help decrease sneezing and a runny nose.     Cough suppressants  help decrease how much you cough.    Expectorants   help loosen mucus so you can cough it up.    Take your medicine as directed.  Contact your healthcare provider if you think your medicine is not helping or if you have side effects. Tell him of her if you are allergic to any medicine. Keep a list of the medicines, vitamins, and herbs you take. Include the amounts, and when and why you take them. Bring the list or the pill bottles to follow-up visits. Carry your medicine list with you in case of an emergency.  Symptom relief:  The following may help relieve cold symptoms, such as a dry throat and congestion:  Gargle with mouthwash or warm salt water as directed.     Suck on throat lozenges or hard candy.     Use a cold or warm vaporizer or humidifier to ease your breathing.     Rest for at least 2 days and then as needed to decrease tiredness and weakness.     Use petroleum based jelly around your nostrils to decrease irritation from blowing your nose.  Drink liquids:  Liquids will help thin and loosen thick mucus so you can cough it up. Liquids will also keep you hydrated. Ask your healthcare provider which liquids are best for you and how much to drink each day.  Prevent the spread of germs:  You can spread your cold germs to others for at least 3 days after your symptoms start. Wash your hands often. Do not share items, such as eating utensils. Cover your nose and mouth when you cough or sneeze using the crook of your elbow instead of your hands. Throw used tissues in the garbage.  Do not smoke:  Smoking may worsen your symptoms and increase the length of time you feel sick. Talk with your healthcare provider if you need help to stop smoking.  Follow up with your healthcare provider as directed:  Write down your questions so you remember to ask them during your visits.   © 2017 [employer] Information is for End User's use only and may not be sold, redistributed or otherwise used for commercial purposes. All illustrations and images included in  CareNotes® are the copyrighted property of [employer], [employer] or [employer].  The above information is an  only. It is not intended as medical advice for individual conditions or treatments. Talk to your doctor, nurse or pharmacist before following any medical regimen to see if it is safe and effective for you.

## 2025-02-27 ENCOUNTER — OFFICE VISIT (OUTPATIENT)
Dept: OBGYN CLINIC | Facility: CLINIC | Age: 40
End: 2025-02-27
Payer: COMMERCIAL

## 2025-02-27 ENCOUNTER — TELEPHONE (OUTPATIENT)
Age: 40
End: 2025-02-27

## 2025-02-27 ENCOUNTER — APPOINTMENT (OUTPATIENT)
Dept: RADIOLOGY | Facility: AMBULARY SURGERY CENTER | Age: 40
End: 2025-02-27
Attending: ORTHOPAEDIC SURGERY
Payer: COMMERCIAL

## 2025-02-27 VITALS — BODY MASS INDEX: 45.1 KG/M2 | WEIGHT: 315 LBS | HEIGHT: 70 IN

## 2025-02-27 DIAGNOSIS — S52.022A: Primary | ICD-10-CM

## 2025-02-27 DIAGNOSIS — M25.522 PAIN IN LEFT ELBOW: ICD-10-CM

## 2025-02-27 PROBLEM — M77.8: Status: ACTIVE | Noted: 2025-02-27

## 2025-02-27 PROCEDURE — 99203 OFFICE O/P NEW LOW 30 MIN: CPT | Performed by: ORTHOPAEDIC SURGERY

## 2025-02-27 PROCEDURE — 73080 X-RAY EXAM OF ELBOW: CPT

## 2025-02-27 NOTE — TELEPHONE ENCOUNTER
Caller: patient    Doctor: Dr. Bush    Reason for call: pt called asking if he has work restriction for his left elbow. If so, can he get a work note    Call back#: [phone]

## 2025-02-28 PROBLEM — M25.522 PAIN IN LEFT ELBOW: Status: ACTIVE | Noted: 2025-02-28

## 2025-03-01 NOTE — PROGRESS NOTES
Name: Aleksandr Tovar      : 1985      MRN: [id number]  Encounter Provider: Chuy Bush MD  Encounter Date: 2025   Encounter department: [facility]  :  Assessment & Plan  Pain in left elbow    Orders:    XR elbow 3+ vw left; Future    Closed olecranon process fracture, left, initial encounter  Explained my current clinical findings and reviewed radiological findings with the patient.  He has sustained a left olecranon process tip enthesophyte nondisplaced fracture secondary to direct impact trauma in the area after falling on ice.  He is able to actively flex and extend his left elbow with some discomfort.  I advised him to wear the left arm sling only for comfort and initiate left elbow active range of motion as tolerated to avoid posttraumatic stiffness.  He is recommended to avoid any direct pressure over the left elbow or doing any weightbearing/push-up type activity.  Local ice application and oral acetaminophen as needed for pain control.  Follow-up in about 2 weeks with a view to consider initiating physical therapy rehabilitation if symptoms persist.  Patient expressed understanding and was in agreement with the treatment plan.                History of Present Illness   HPI  Aleksandr Tovar is a 40 y.o. male who presents today for evaluation of acute left elbow injury sustained when he slipped on ice and fell onto his left elbow while playing disc golf.  This incident happened on 2025.  He subsequently went to an urgent care facility and thereafter to an external orthopedic facility.  So far, he has been treated in an arm sling.  Reports pain with movement of his left elbow or with direct pressure.  Pain is noted most prominently over the posterior aspect of his left elbow.  Denies any distal tingling or numbness.    History obtained from: patient    Review of Systems  Current Outpatient Medications on File Prior to Visit   Medication Sig Dispense  "Refill    multivitamin (THERAGRAN) TABS Take 1 tablet by mouth daily      Vitamin D, Ergocalciferol, 2000 units CAPS Take 2,000 Units by mouth daily      lidocaine (LMX) 4 % cream Apply topically as needed for mild pain 30 g 0    valACYclovir (VALTREX) 1,000 mg tablet Take 1 tablet (1,000 mg total) by mouth 2 (two) times a day for 10 days 20 tablet 0     No current facility-administered medications on file prior to visit.         Objective   Ht 5' 10\" (1.778 m)   Wt (!) 156 kg (343 lb)   BMI 49.22 kg/m²      Physical Exam         Left Elbow Exam     Tenderness   Left elbow tenderness location: Tender to palpation over the tip of olecranon as well as some tenderness over the medial epicondyle.     Range of Motion   Extension:  -20   Flexion:  90   Left elbow pronation: Able to actively perform with some difficulty.   Left elbow supination: Able to actively perform with some difficulty.     Muscle Strength   Left elbow normal strength: Able to actively flex and extend left elbow with difficulty.    Tests   Tinel's sign (cubital tunnel): negative    Other   Erythema: absent    Comments:  Further special testing was currently deferred due to patient's discomfort.           I have personally reviewed pertinent films in PACS and my interpretation is plain radiograph of the left elbow performed today reveals insertional enthesophyte at the olecranon process with suspected acute fracture of the enthesophyte from the olecranon tip.   Procedures  Portions of the record may have been created with voice recognition software. Occasional wrong word or \"sound alike\" substitutions may have occurred due to the inherent limitations of voice recognition software. Please review the chart carefully and recognize, using context, where substitutions/typographical errors may have occurred.      "

## 2025-03-01 NOTE — ASSESSMENT & PLAN NOTE
Explained my current clinical findings and reviewed radiological findings with the patient.  He has sustained a left olecranon process tip enthesophyte nondisplaced fracture secondary to direct impact trauma in the area after falling on ice.  He is able to actively flex and extend his left elbow with some discomfort.  I advised him to wear the left arm sling only for comfort and initiate left elbow active range of motion as tolerated to avoid posttraumatic stiffness.  He is recommended to avoid any direct pressure over the left elbow or doing any weightbearing/push-up type activity.  Local ice application and oral acetaminophen as needed for pain control.  Follow-up in about 2 weeks with a view to consider initiating physical therapy rehabilitation if symptoms persist.  Patient expressed understanding and was in agreement with the treatment plan.

## 2025-03-13 VITALS — HEIGHT: 70 IN | WEIGHT: 315 LBS | BODY MASS INDEX: 45.1 KG/M2

## 2025-03-13 DIAGNOSIS — M25.522 PAIN IN LEFT ELBOW: ICD-10-CM

## 2025-03-13 DIAGNOSIS — S52.022D: Primary | ICD-10-CM

## 2025-03-13 PROCEDURE — 99213 OFFICE O/P EST LOW 20 MIN: CPT | Performed by: ORTHOPAEDIC SURGERY

## 2025-03-13 NOTE — ASSESSMENT & PLAN NOTE
Orders:    Ambulatory Referral to Physical Therapy; Future    
Explained my current clinical findings to the patient.  Symptomatically, he has some improved range of motion and pain compared to the last office visit.  I have encouraged him to continue with home-based active range of motion exercises and will also make a referral for formal physical therapy rehabilitation.  Will see him back in about 5 weeks time for clinical reassessment.  We discussed that if symptoms worsen we could consider doing advanced imaging of the left elbow.  Orders:    Ambulatory Referral to Physical Therapy; Future    
Statement Selected

## 2025-03-13 NOTE — PROGRESS NOTES
Name: Aleksandr Tovar      : 1985      MRN: [id number]  Encounter Provider: Chuy Bush MD  Encounter Date: 3/13/2025   Encounter department: [facility]  :  Assessment & Plan  Closed olecranon process fracture, left, with routine healing, subsequent encounter  Explained my current clinical findings to the patient.  Symptomatically, he has some improved range of motion and pain compared to the last office visit.  I have encouraged him to continue with home-based active range of motion exercises and will also make a referral for formal physical therapy rehabilitation.  Will see him back in about 5 weeks time for clinical reassessment.  We discussed that if symptoms worsen we could consider doing advanced imaging of the left elbow.  Orders:    Ambulatory Referral to Physical Therapy; Future    Pain in left elbow    Orders:    Ambulatory Referral to Physical Therapy; Future        History of Present Illness   HPI  Aleksandr Tovar is a 40 y.o. male who presents for follow-up of his left elbow closed olecranon process tip enthesophyte nondisplaced fracture which was sustained secondary to a direct impact trauma after slipping and falling on ice on 2025.  Initially treated in a sling and recommended to initiate left elbow active range of motion exercises at the last office visit on 2025.  Today, he reports that his left elbow pain and range of motion are gradually improving.  He still finds difficulty even trying to do any loading or push-up type activities from his left upper extremity.  Denies any new injury.    History obtained from: patient    Review of Systems  Current Outpatient Medications on File Prior to Visit   Medication Sig Dispense Refill    multivitamin (THERAGRAN) TABS Take 1 tablet by mouth daily      Vitamin D, Ergocalciferol, 2000 units CAPS Take 2,000 Units by mouth daily      lidocaine (LMX) 4 % cream Apply topically as needed for mild pain 30 g 0  "   valACYclovir (VALTREX) 1,000 mg tablet Take 1 tablet (1,000 mg total) by mouth 2 (two) times a day for 10 days 20 tablet 0     No current facility-administered medications on file prior to visit.         Objective   Ht 5' 10\" (1.778 m)   Wt (!) 156 kg (343 lb)   BMI 49.22 kg/m²      Physical Exam         Left Elbow Exam     Tenderness   Left elbow tenderness location: Most prominently tender over the tip of olecranon process.  Mild discomfort over the lateral epicondylar area and the medial epicondylar area.     Range of Motion   Extension:  -20   Flexion:  120   Left elbow pronation: Full pronation.   Left elbow supination: Mild limitation of terminal supination.     Muscle Strength   Pronation:  4/5   Supination:  4/5     Tests   Tinel's sign (cubital tunnel): negative    Comments:  Discomfort with resisted left elbow extension.  Does report some mild discomfort with valgus and varus stress testing but without any laxity.  Clinically intact distal neurovascular status.           I have personally reviewed pertinent films in PACS.   Procedures  Portions of the record may have been created with voice recognition software. Occasional wrong word or \"sound alike\" substitutions may have occurred due to the inherent limitations of voice recognition software. Please review the chart carefully and recognize, using context, where substitutions/typographical errors may have occurred.      "

## 2025-03-24 ENCOUNTER — EVALUATION (OUTPATIENT)
Dept: PHYSICAL THERAPY | Facility: CLINIC | Age: 40
End: 2025-03-24
Payer: COMMERCIAL

## 2025-03-24 DIAGNOSIS — M25.522 PAIN IN LEFT ELBOW: ICD-10-CM

## 2025-03-24 DIAGNOSIS — S52.022D: ICD-10-CM

## 2025-03-24 PROCEDURE — 97110 THERAPEUTIC EXERCISES: CPT | Performed by: PHYSICAL THERAPIST

## 2025-03-24 PROCEDURE — 97161 PT EVAL LOW COMPLEX 20 MIN: CPT | Performed by: PHYSICAL THERAPIST

## 2025-03-24 NOTE — PROGRESS NOTES
PT Evaluation     Today's date: 3/24/2025  Patient name: Aleksandr Tovar  : 1985  MRN: [id number]  Referring provider: Chuy Bush MD  Dx:   Encounter Diagnosis     ICD-10-CM    1. Pain in left elbow  M25.522 Ambulatory Referral to Physical Therapy      2. Closed olecranon process fracture, left, with routine healing, subsequent encounter  S52.022D Ambulatory Referral to Physical Therapy                     Assessment  Impairments: abnormal or restricted ROM, impaired physical strength, lacks appropriate home exercise program, pain with function and poor posture   Symptom irritability: low    Assessment details: Pt is a 40 y.o. male presenting to PT with L elbow closed olecranon tip enthesophyte fracture after slip and fall on ice, DOI 25. PT findings include: limited elbow ext ROM, tricep strength deficit,  strength deficits also shoulder strength deficits. Findings are consistent with injury sustained. Pt is about 1 month since injury therefore goal to keep joints above/below elbow strong and plan to initiate more isometric strengthening of tricep in 2 weeks time.  Pt educated on PT findings, anatomy, biomechanics, POC and rehab course. Pt will benefit from skilled PT to address above impairments to return to PLOF with less restriction and to reach functional goals.   Understanding of Dx/Px/POC: good     Prognosis: good    Goals  1. Pt will demonstrate understanding of HEP and POC in order to improve compliance with course of rehab in 2 weeks.  2. Pt's elbow flex/ext ROM will improve to WNL allowing pt to perform UE activities with less restriction in 3 weeks.   3. Pt's pain will be 2/10 or less to allow pt to return to PLOF with least restriction by d/c.   4. Pt will demonstrate elbow extension MMT to at least 4/5 allowing pt to performing pushing activities by d/c.     Plan  Patient would benefit from: skilled physical therapy    Planned therapy interventions: joint mobilization, manual  therapy, strengthening, stretching, therapeutic exercise, therapeutic activities, home exercise program, functional ROM exercises and flexibility    Frequency: 2x week  Duration in weeks: 6  Treatment plan discussed with: patient    Subjective Evaluation    History of Present Illness  Mechanism of injury: Pt arrives to PT via referral from orthopedic surgery for L elbow pain. Pt sustained a olecranon process nondisplaced fracture after fall onto elbow after slipping on ice, DOI 25. Pt reports weakness, difficulty pushing up with his hand and limited ROM of elbow.    Pt reports that he has regained a lot of his ROM back. Pt reports that at this time he is still having a lot of pain with leaning on elbow or ruthie the tricep.       Quality of life: good    Patient Goals  Patient goals for therapy: decreased pain, increased motion, increased strength and independence with ADLs/IADLs    Pain  Current pain ratin  At best pain ratin  At worst pain ratin  Location: L  Quality: sharp, tight and dull ache        Objective    Flowsheet Rows      Flowsheet Row Most Recent Value   PT/OT G-Codes    Current Score 50   Projected Score 69        Strength:    strength #2 position  R: 90#  L: 40#    Elbow ROM:  Flex: 135°  Ext: lacking 10°  Forearm supination: 70°  Forearm pronation: 90°    Wrist flex: WNL  Wrist Ext: WNL    Shoulder strength:  Flex: 3+/5  Ext: 3+/5 (pain)  Elbow ext: 3+/5 (pain)  Elbow flex: 4/5       Precautions: olecranon process fx, nondisplaced DOI       Manuals 3/24                                                                Neuro Re-Ed                                                                                                        Ther Ex             Row Red TB HEP            Elbow flex/ext AROM HEP            Putty  HEP            Wrist flex Red HEP 5# DB           Wrist ext   5# DB           Shoulder raise Red flex/abd HEP            Prone periscapular lift T, ext  Trial NV            Tball wall circles  Trial NV gentle           Tricep extension isometric  Trial NV submax           UBE  Consider retro (per pain)                                     Ther Activity                                       Gait Training                                       Modalities

## 2025-03-27 ENCOUNTER — APPOINTMENT (OUTPATIENT)
Dept: PHYSICAL THERAPY | Facility: CLINIC | Age: 40
End: 2025-03-27
Payer: COMMERCIAL

## 2025-03-31 ENCOUNTER — APPOINTMENT (OUTPATIENT)
Dept: PHYSICAL THERAPY | Facility: CLINIC | Age: 40
End: 2025-03-31
Payer: COMMERCIAL

## 2025-04-03 ENCOUNTER — APPOINTMENT (OUTPATIENT)
Dept: PHYSICAL THERAPY | Facility: CLINIC | Age: 40
End: 2025-04-03
Payer: COMMERCIAL

## 2025-04-07 ENCOUNTER — OFFICE VISIT (OUTPATIENT)
Dept: PHYSICAL THERAPY | Facility: CLINIC | Age: 40
End: 2025-04-07
Payer: COMMERCIAL

## 2025-04-07 DIAGNOSIS — M25.522 PAIN IN LEFT ELBOW: Primary | ICD-10-CM

## 2025-04-07 DIAGNOSIS — S52.022D: ICD-10-CM

## 2025-04-07 PROCEDURE — 97110 THERAPEUTIC EXERCISES: CPT

## 2025-04-07 NOTE — PROGRESS NOTES
"Daily Note     Today's date: 2025  Patient name: Aleksandr Tovar  : 1985  MRN: [id number]  Referring provider: Chuy Bush MD  Dx:   Encounter Diagnosis     ICD-10-CM    1. Pain in left elbow  M25.522       2. Closed olecranon process fracture, left, with routine healing, subsequent encounter  S52.900D                      Subjective: Pt reports his L elbow is feeling a lot better.  Notes he is compliant w/HEP.      Objective: See treatment diary below      Assessment: Reviewed/performed HEP. New exercises and ex progression,  w/some muscle soreness. Issued GTB and updated written HEP instructions, reviewed same w/pt. Tolerated treatment well. Will monitor. Patient would benefit from continued PT to decrease pain, increase strength and LOF.      Plan: Continue per plan of care.      Precautions: olecranon process fx, nondisplaced DOI       Manuals 3/24 4/7                                                               Neuro Re-Ed                                                                                                        Ther Ex             Row Red TB HEP Grn 2x10           Elbow flex/ext AROM HEP 3x10           Putty  HEP HEP           Wrist flex Red HEP 5# x15           Wrist ext   5# x15           Shoulder raise Red flex/abd HEP Red x10 ea; grn 2x10 ea           Prone periscapular lift T, ext Trial NV 15x ea           Tball wall circles  20x ea cw/ccw           Tricep extension isometric  5\"x10 submax           UBE  5' retro                                     Ther Activity                                       Gait Training                                       Modalities                                            "

## 2025-04-14 ENCOUNTER — APPOINTMENT (OUTPATIENT)
Dept: PHYSICAL THERAPY | Facility: CLINIC | Age: 40
End: 2025-04-14
Attending: ORTHOPAEDIC SURGERY
Payer: COMMERCIAL

## 2025-04-17 VITALS — BODY MASS INDEX: 46.65 KG/M2 | HEIGHT: 69 IN | WEIGHT: 315 LBS

## 2025-04-17 DIAGNOSIS — S52.022D: Primary | ICD-10-CM

## 2025-04-17 PROCEDURE — 99213 OFFICE O/P EST LOW 20 MIN: CPT | Performed by: ORTHOPAEDIC SURGERY

## 2025-04-17 NOTE — PROGRESS NOTES
"Name: Aleksandr Tovar      : 1985      MRN: [id number]  Encounter Provider: Chuy Bush MD  Encounter Date: 2025   Encounter department: [facility]  :  Assessment & Plan  Closed olecranon process fracture, left, with routine healing, subsequent encounter  Patient has had full functional recovery in this regard.  Recommend him to continue with home-based left elbow range of motion and strengthening exercises and may return back to all physical activities at this time.  Follow-up as needed.           History of Present Illness   HPI  Aleksandr Tovar is a 40 y.o. male who presents for follow-up of his left elbow olecranon process enthesophyte nondisplaced fracture.  Injury was sustained on 2025 when he slipped on ice and fell onto his left elbow while playing disc golf.  He has been treated conservatively with physical therapy rehabilitation at this time he denies having any further pain of his left elbow or any functional limitations of the left upper extremity.  Denies any new injury.  History obtained from: patient    Review of Systems       Objective   Ht 5' 9\" (1.753 m)   Wt (!) 156 kg (343 lb)   BMI 50.65 kg/m²      Physical Exam         Left Elbow Exam     Tenderness   The patient is experiencing no tenderness.     Range of Motion   Extension:  normal   Flexion:  normal   Pronation:  normal   Supination:  normal     Muscle Strength   Pronation:  5/5   Supination:  5/5     Tests   Varus: negative  Valgus: negative  Tinel's sign (cubital tunnel): negative    Other   Erythema: absent    Comments:  Grade 5/5 strength of the left elbow in all directions.           I have personally reviewed pertinent films in PACS.   Procedures  Portions of the record may have been created with voice recognition software. Occasional wrong word or \"sound alike\" substitutions may have occurred due to the inherent limitations of voice recognition software. Please review the " chart carefully and recognize, using context, where substitutions/typographical errors may have occurred.

## 2025-04-24 ENCOUNTER — APPOINTMENT (OUTPATIENT)
Dept: PHYSICAL THERAPY | Facility: CLINIC | Age: 40
End: 2025-04-24
Payer: COMMERCIAL

## 2025-04-28 ENCOUNTER — APPOINTMENT (OUTPATIENT)
Dept: PHYSICAL THERAPY | Facility: CLINIC | Age: 40
End: 2025-04-28
Payer: COMMERCIAL

## (undated) DEVICE — ACE WRAP 4 IN UNSTERILE

## (undated) DEVICE — URETERAL CATHETER ADAPTOR TIP

## (undated) DEVICE — PADDING CAST 4 IN  COTTON STRL

## (undated) DEVICE — OCCLUSIVE GAUZE STRIP,3% BISMUTH TRIBROMOPHENATE IN PETROLATUM BLEND: Brand: XEROFORM

## (undated) DEVICE — BRUSH EZ SCRUB PCMX W/NAIL CLEANER

## (undated) DEVICE — [HIGH FLOW INSUFFLATOR,  DO NOT USE IF PACKAGE IS DAMAGED,  KEEP DRY,  KEEP AWAY FROM SUNLIGHT,  PROTECT FROM HEAT AND RADIOACTIVE SOURCES.]: Brand: PNEUMOSURE

## (undated) DEVICE — CHLORAPREP HI-LITE 26ML ORANGE

## (undated) DEVICE — SURGICAL GOWN, XL SMARTSLEEVE: Brand: CONVERTORS

## (undated) DEVICE — TROCAR VISIPORT

## (undated) DEVICE — GLOVE SRG BIOGEL 7.5

## (undated) DEVICE — PADDING,UNDERCAST,COTTON, 4"X4YD STERILE: Brand: MEDLINE

## (undated) DEVICE — 3M™ DURAPORE™ SURGICAL TAPE 1538-1, 1 INCH X 10 YARD (2,5CM X 9,1M), 12 ROLLS/BOX: Brand: 3M™ DURAPORE™

## (undated) DEVICE — HARMONIC ACE +7 LAPAROSCOPIC SHEARS ADVANCED HEMOSTASIS 5MM DIAMETER 36CM SHAFT LENGTH  FOR USE WITH GRAY HAND PIECE ONLY: Brand: HARMONIC ACE

## (undated) DEVICE — TUBING SUCTION 5MM X 12 FT

## (undated) DEVICE — PENCIL ELECTROSURG E-Z CLEAN -0035H

## (undated) DEVICE — BETHLEHEM UNIVERSAL  MIONR EXT: Brand: CARDINAL HEALTH

## (undated) DEVICE — REM POLYHESIVE ADULT PATIENT RETURN ELECTRODE: Brand: VALLEYLAB

## (undated) DEVICE — LIGHT HANDLE COVER SLEEVE DISP BLUE STELLAR

## (undated) DEVICE — INTENDED FOR TISSUE SEPARATION, AND OTHER PROCEDURES THAT REQUIRE A SHARP SURGICAL BLADE TO PUNCTURE OR CUT.: Brand: BARD-PARKER SAFETY BLADES SIZE 15, STERILE

## (undated) DEVICE — WEBRIL 6 IN UNSTERILE

## (undated) DEVICE — GLOVE INDICATOR PI UNDERGLOVE SZ 8 BLUE

## (undated) DEVICE — ENDOPATH XCEL BLADELESS TROCARS WITH STABILITY SLEEVES: Brand: ENDOPATH XCEL

## (undated) DEVICE — SUT MONOCRYL 4-0 PS-2 27 IN Y426H

## (undated) DEVICE — ACE WRAP 6 IN UNSTERILE

## (undated) DEVICE — STAPLER GIA HANDLE STAND 4MM

## (undated) DEVICE — SEAMGUARD STPL REINF ENDO GIA ULTRA UNIV 60 PURPLE

## (undated) DEVICE — IRRIG ENDO FLO TUBING

## (undated) DEVICE — TRAVELKIT CONTAINS FIRST STEP KIT (200ML EP-4 KIT) AND SOILED SCOPE BAG - 1 KIT: Brand: TRAVELKIT CONTAINS FIRST STEP KIT AND SOILED SCOPE BAG

## (undated) DEVICE — DISPOSABLE EQUIPMENT COVER: Brand: SMALL TOWEL DRAPE

## (undated) DEVICE — TIBURON LAPAROSCOPIC ABDOMINAL DRAPE: Brand: CONVERTORS

## (undated) DEVICE — ABDOMINAL PAD: Brand: DERMACEA

## (undated) DEVICE — PMI DISPOSABLE PUNCTURE CLOSURE DEVICE / SUTURE GRASPER: Brand: PMI

## (undated) DEVICE — LAP AEM SCISSOR TIP .75 IN

## (undated) DEVICE — SINGLE-USE BIOPSY FORCEPS: Brand: RADIAL JAW 4

## (undated) DEVICE — PADDING CAST 6IN COTTON STRL

## (undated) DEVICE — VIOLET BRAIDED (POLYGLACTIN 910), SYNTHETIC ABSORBABLE SUTURE: Brand: COATED VICRYL

## (undated) DEVICE — ADHESIVE SKN CLSR HISTOACRYL FLEX 0.5ML LF

## (undated) DEVICE — DRAPE SHEET THREE QUARTER

## (undated) DEVICE — SEAMGUARD STPL REINF ENDO GIA ULTRA UNV 60 BLACK

## (undated) DEVICE — BLUE HEAT SCOPE WARMER

## (undated) DEVICE — GLOVE SRG BIOGEL 8

## (undated) DEVICE — TUBING SMOKE EVAC W/FILTRATION DEVICE PLUMEPORT ACTIV

## (undated) DEVICE — 3000CC GUARDIAN II: Brand: GUARDIAN

## (undated) DEVICE — SUT VICRYL 4-0 PS-2 18 IN J496G

## (undated) DEVICE — VISIGI 3D®  CALIBRATION SYSTEM  SIZE 36FR SLEEVE/STD: Brand: BOEHRINGER® VISIGI 3D™ SLEEVE GASTRECTOMY CALIBRATION SYSTEM, SIZE 36FR

## (undated) DEVICE — SCD SEQUENTIAL COMPRESSION COMFORT SLEEVE MEDIUM KNEE LENGTH: Brand: KENDALL SCD

## (undated) DEVICE — DRESSING XEROFORM 5 X 9

## (undated) DEVICE — SUT VICRYL 2-0 CT-2 18 IN J726D

## (undated) DEVICE — GAUZE SPONGES,16 PLY: Brand: CURITY

## (undated) DEVICE — CURITY STRETCH BANDAGE: Brand: CURITY

## (undated) DEVICE — ALLENTOWN LAP CHOLE APP PACK: Brand: CARDINAL HEALTH

## (undated) DEVICE — INTENDED FOR TISSUE SEPARATION, AND OTHER PROCEDURES THAT REQUIRE A SHARP SURGICAL BLADE TO PUNCTURE OR CUT.: Brand: BARD-PARKER ® CARBON RIB-BACK BLADES

## (undated) DEVICE — NEEDLE HYPO 22G X 1-1/2 IN

## (undated) DEVICE — AEM CORD

## (undated) DEVICE — COVIDIEN ENDO GIA PURPLE (MED) RELOAD 60MM

## (undated) DEVICE — SUT ETHILON 3-0 PS-1 18 IN 1663G

## (undated) DEVICE — COVIDIEN GIA BLACK (XTRA THICK) RELOAD